# Patient Record
Sex: FEMALE | Race: BLACK OR AFRICAN AMERICAN | Employment: FULL TIME | URBAN - METROPOLITAN AREA
[De-identification: names, ages, dates, MRNs, and addresses within clinical notes are randomized per-mention and may not be internally consistent; named-entity substitution may affect disease eponyms.]

---

## 2017-05-15 PROBLEM — G44.40 ANALGESIC REBOUND HEADACHE: Status: ACTIVE | Noted: 2017-05-15

## 2017-05-15 PROBLEM — Z79.899 ENCOUNTER FOR MEDICATION MANAGEMENT: Status: ACTIVE | Noted: 2017-05-15

## 2017-05-15 PROBLEM — T39.95XA ANALGESIC REBOUND HEADACHE: Status: ACTIVE | Noted: 2017-05-15

## 2017-05-15 PROBLEM — G43.011 INTRACTABLE MIGRAINE WITHOUT AURA AND WITH STATUS MIGRAINOSUS: Status: ACTIVE | Noted: 2017-05-15

## 2017-05-15 PROBLEM — F51.04 PSYCHOPHYSIOLOGICAL INSOMNIA: Status: ACTIVE | Noted: 2017-05-15

## 2017-10-10 ENCOUNTER — HOSPITAL ENCOUNTER (OUTPATIENT)
Dept: GENERAL RADIOLOGY | Age: 47
Discharge: HOME | End: 2017-10-10
Attending: NURSE PRACTITIONER
Payer: COMMERCIAL

## 2017-10-10 DIAGNOSIS — M25.552 LEFT HIP PAIN: ICD-10-CM

## 2017-10-10 DIAGNOSIS — M54.50 LOW BACK PAIN RADIATING TO LEFT LOWER EXTREMITY: ICD-10-CM

## 2017-10-10 DIAGNOSIS — M79.605 LOW BACK PAIN RADIATING TO LEFT LOWER EXTREMITY: ICD-10-CM

## 2017-10-10 PROCEDURE — 73502 X-RAY EXAM HIP UNI 2-3 VIEWS: CPT

## 2017-10-10 PROCEDURE — 72100 X-RAY EXAM L-S SPINE 2/3 VWS: CPT

## 2017-10-10 NOTE — PROGRESS NOTES
Xray of her back is normal. So use medications I prescribed, heat on the area as well.  If no better in 1 week f/u with dr. Artis Montiel

## 2017-11-16 ENCOUNTER — HOSPITAL ENCOUNTER (EMERGENCY)
Age: 47
Discharge: HOME | End: 2017-11-16
Attending: EMERGENCY MEDICINE
Payer: COMMERCIAL

## 2017-11-16 VITALS
TEMPERATURE: 98.2 F | BODY MASS INDEX: 29.25 KG/M2 | DIASTOLIC BLOOD PRESSURE: 65 MMHG | HEIGHT: 66 IN | WEIGHT: 182 LBS | SYSTOLIC BLOOD PRESSURE: 144 MMHG | OXYGEN SATURATION: 97 % | RESPIRATION RATE: 18 BRPM | HEART RATE: 66 BPM

## 2017-11-16 DIAGNOSIS — J06.9 ACUTE UPPER RESPIRATORY INFECTION: Primary | ICD-10-CM

## 2017-11-16 PROCEDURE — 99282 EMERGENCY DEPT VISIT SF MDM: CPT | Performed by: EMERGENCY MEDICINE

## 2017-11-16 RX ORDER — AZITHROMYCIN 250 MG/1
TABLET, FILM COATED ORAL
Qty: 6 TAB | Refills: 0 | Status: SHIPPED | OUTPATIENT
Start: 2017-11-16 | End: 2018-01-05 | Stop reason: ALTCHOICE

## 2017-11-16 NOTE — ED NOTES
I have reviewed discharge instructions with the patient. The patient verbalized understanding. Patient left ED via Discharge Method: ambulatory to Home with ( self). Opportunity for questions and clarification provided. Patient given 1 scripts. To continue your aftercare when you leave the hospital, you may receive an automated call from our care team to check in on how you are doing. This is a free service and part of our promise to provide the best care and service to meet your aftercare needs.  If you have questions, or wish to unsubscribe from this service please call [phone]. Thank you for Choosing our [facility] Emergency Department.

## 2017-11-16 NOTE — ED PROVIDER NOTES
HPI Comments: Pt with cough, congestion rhinorrhea for past 5 days. Patient is a 52 y.o. female presenting with cough. The history is provided by the patient. No  was used. Cough   This is a new problem. The current episode started more than 2 days ago. The problem occurs constantly. The problem has not changed since onset. The cough is non-productive. There has been no fever. Associated symptoms include rhinorrhea and shortness of breath. Pertinent negatives include no chest pain, no chills, no eye redness, no ear pain, no headaches, no sore throat, no myalgias, no wheezing, no nausea and no vomiting. She has tried cough syrup for the symptoms. The treatment provided no relief. Past Medical History:   Diagnosis Date    Depression 8/16/2011    Migraines        Past Surgical History:   Procedure Laterality Date    HX PELVIC LAPAROSCOPY  1993    endometriosis         Family History:   Problem Relation Age of Onset    Heart Disease Mother     Hypertension Mother     Diabetes Mother     Breast Cancer Mother 48    Hypertension Father     No Known Problems Sister     No Known Problems Sister     No Known Problems Maternal Grandmother     Cancer Maternal Grandfather     Hypertension Paternal Grandmother     Cancer Paternal Grandfather        Social History     Social History    Marital status: SINGLE     Spouse name: N/A    Number of children: N/A    Years of education: N/A     Occupational History    Not on file. Social History Main Topics    Smoking status: Never Smoker    Smokeless tobacco: Never Used    Alcohol use No    Drug use: No    Sexual activity: Not Currently     Other Topics Concern    Not on file     Social History Narrative         ALLERGIES: Review of patient's allergies indicates no known allergies. Review of Systems   Constitutional: Negative for chills and fever. HENT: Positive for rhinorrhea. Negative for ear pain and sore throat.     Eyes: Negative for pain and redness. Respiratory: Positive for cough and shortness of breath. Negative for chest tightness and wheezing. Cardiovascular: Negative for chest pain and leg swelling. Gastrointestinal: Negative for abdominal pain, diarrhea, nausea and vomiting. Musculoskeletal: Negative for back pain, gait problem, myalgias, neck pain and neck stiffness. Skin: Negative for color change and rash. Neurological: Negative for weakness, numbness and headaches. Vitals:    11/16/17 0230   BP: 144/65   Pulse: 66   Resp: 18   Temp: 98.2 °F (36.8 °C)   SpO2: 97%   Weight: 82.6 kg (182 lb)   Height: 5' 6\" (1.676 m)            Physical Exam   Constitutional: She is oriented to person, place, and time. She appears well-developed and well-nourished. HENT:   Head: Normocephalic and atraumatic. Neck: Normal range of motion. Neck supple. Cardiovascular: Normal rate and regular rhythm. No murmur heard. Pulmonary/Chest: Effort normal and breath sounds normal. She has no wheezes. Abdominal: Soft. Bowel sounds are normal. There is no tenderness. Musculoskeletal: Normal range of motion. She exhibits no edema. Lymphadenopathy:     She has no cervical adenopathy. Neurological: She is alert and oriented to person, place, and time. Skin: Skin is warm and dry. Nursing note and vitals reviewed. MDM  Number of Diagnoses or Management Options  Acute upper respiratory infection:   Diagnosis management comments: URI will discharge.      Patient Progress  Patient progress: stable    ED Course       Procedures

## 2017-11-16 NOTE — DISCHARGE INSTRUCTIONS
Upper Respiratory Infection (Cold): Care Instructions  Your Care Instructions    An upper respiratory infection, or URI, is an infection of the nose, sinuses, or throat. URIs are spread by coughs, sneezes, and direct contact. The common cold is the most frequent kind of URI. The flu and sinus infections are other kinds of URIs. Almost all URIs are caused by viruses. Antibiotics won't cure them. But you can treat most infections with home care. This may include drinking lots of fluids and taking over-the-counter pain medicine. You will probably feel better in 4 to 10 days. The doctor has checked you carefully, but problems can develop later. If you notice any problems or new symptoms, get medical treatment right away. Follow-up care is a key part of your treatment and safety. Be sure to make and go to all appointments, and call your doctor if you are having problems. It's also a good idea to know your test results and keep a list of the medicines you take. How can you care for yourself at home? · To prevent dehydration, drink plenty of fluids, enough so that your urine is light yellow or clear like water. Choose water and other caffeine-free clear liquids until you feel better. If you have kidney, heart, or liver disease and have to limit fluids, talk with your doctor before you increase the amount of fluids you drink. · Take an over-the-counter pain medicine, such as acetaminophen (Tylenol), ibuprofen (Advil, Motrin), or naproxen (Aleve). Read and follow all instructions on the label. · Before you use cough and cold medicines, check the label. These medicines may not be safe for young children or for people with certain health problems. · Be careful when taking over-the-counter cold or flu medicines and Tylenol at the same time. Many of these medicines have acetaminophen, which is Tylenol. Read the labels to make sure that you are not taking more than the recommended dose.  Too much acetaminophen (Tylenol) can be harmful. · Get plenty of rest.  · Do not smoke or allow others to smoke around you. If you need help quitting, talk to your doctor about stop-smoking programs and medicines. These can increase your chances of quitting for good. When should you call for help? Call 911 anytime you think you may need emergency care. For example, call if:  ? · You have severe trouble breathing. ?Call your doctor now or seek immediate medical care if:  ? · You seem to be getting much sicker. ? · You have new or worse trouble breathing. ? · You have a new or higher fever. ? · You have a new rash. ? Watch closely for changes in your health, and be sure to contact your doctor if:  ? · You have a new symptom, such as a sore throat, an earache, or sinus pain. ? · You cough more deeply or more often, especially if you notice more mucus or a change in the color of your mucus. ? · You do not get better as expected. Where can you learn more? Go to http://juan carlos-kori.info/. Enter L155 in the search box to learn more about \"Upper Respiratory Infection (Cold): Care Instructions. \"  Current as of: May 12, 2017  Content Version: 11.4  © 2138-8696 [employer], [employer]orporated. Care instructions adapted under license by [employer] (which disclaims liability or warranty for this information). If you have questions about a medical condition or this instruction, always ask your healthcare professional. [place] [address] any warranty or liability for your use of this information.

## 2018-01-05 PROBLEM — F33.9 RECURRENT DEPRESSION (HCC): Status: ACTIVE | Noted: 2018-01-05

## 2019-10-28 ENCOUNTER — HOSPITAL ENCOUNTER (EMERGENCY)
Age: 49
Discharge: HOME | End: 2019-10-28
Attending: EMERGENCY MEDICINE
Payer: COMMERCIAL

## 2019-10-28 ENCOUNTER — APPOINTMENT (OUTPATIENT)
Dept: GENERAL RADIOLOGY | Age: 49
End: 2019-10-28
Attending: NURSE PRACTITIONER
Payer: COMMERCIAL

## 2019-10-28 VITALS
OXYGEN SATURATION: 97 % | TEMPERATURE: 97.6 F | RESPIRATION RATE: 16 BRPM | DIASTOLIC BLOOD PRESSURE: 85 MMHG | SYSTOLIC BLOOD PRESSURE: 120 MMHG | HEART RATE: 63 BPM
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
## 2019-10-28 DIAGNOSIS — M54.50 ACUTE BILATERAL LOW BACK PAIN WITHOUT SCIATICA: ICD-10-CM

## 2019-10-28 DIAGNOSIS — S16.1XXA STRAIN OF NECK MUSCLE, INITIAL ENCOUNTER: ICD-10-CM

## 2019-10-28 PROCEDURE — 74011250637 HC RX REV CODE- 250/637: Performed by: NURSE PRACTITIONER

## 2019-10-28 PROCEDURE — 72100 X-RAY EXAM L-S SPINE 2/3 VWS: CPT

## 2019-10-28 PROCEDURE — 72040 X-RAY EXAM NECK SPINE 2-3 VW: CPT

## 2019-10-28 PROCEDURE — 99283 EMERGENCY DEPT VISIT LOW MDM: CPT | Performed by: NURSE PRACTITIONER

## 2019-10-28 RX ORDER — KETOROLAC TROMETHAMINE 10 MG/1
10 TABLET, FILM COATED ORAL
Status: COMPLETED | OUTPATIENT
Start: 2019-10-28 | End: 2019-10-28

## 2019-10-28 RX ORDER — BUTALBITAL, ACETAMINOPHEN AND CAFFEINE 300; 40; 50 MG/1; MG/1; MG/1
1 CAPSULE ORAL
Qty: 8 CAP | Refills: 0 | Status: SHIPPED | OUTPATIENT
Start: 2019-10-28 | End: 2020-01-30 | Stop reason: SDUPTHER

## 2019-10-28 RX ADMIN — KETOROLAC TROMETHAMINE 10 MG: 10 TABLET, FILM COATED ORAL at 16:54
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
## 2019-10-28 NOTE — ED NOTES
I have reviewed discharge instructions with the patient. The patient verbalized understanding. Patient left ED via Discharge Method: ambulatory to Home with self  Opportunity for questions and clarification provided. Patient given 1 scripts. To continue your aftercare when you leave the hospital, you may receive an automated call from our care team to check in on how you are doing. This is a free service and part of our promise to provide the best care and service to meet your aftercare needs.  If you have questions, or wish to unsubscribe from this service please call [phone]. Thank you for Choosing our [facility] Emergency Department.

## 2019-10-28 NOTE — LETTER
[id number] [address] EMERGENCY DEPT 
[address] 
[place] [id number] [phone] Work/School Note Date: 10/28/2019 To Whom It May concern: 
 
Maritza Munroe was seen and treated today in the emergency room by the following provider(s): 
Attending Provider: Robyn Gómez DO 
Nurse Practitioner: JAYLA Alvarenga. Maritza Munroe needs to be excused from work 1/28/2019-10/29/2019.  
 
Sincerely, 
 
 
 
 
JAYLA Bennett
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
## 2019-10-28 NOTE — DISCHARGE INSTRUCTIONS
Medications as prescribed. Exercises provided will help with pain. Follow up with your primary care provider for a recheck if symptoms fail to improve or worsen. Return to the emergency department as needed.

## 2019-11-01 ENCOUNTER — HOSPITAL ENCOUNTER (EMERGENCY)
Age: 49
Discharge: HOME | End: 2019-11-01
Attending: EMERGENCY MEDICINE
Payer: COMMERCIAL

## 2019-11-01 VITALS
HEART RATE: 61 BPM | SYSTOLIC BLOOD PRESSURE: 126 MMHG | DIASTOLIC BLOOD PRESSURE: 66 MMHG | TEMPERATURE: 98 F | OXYGEN SATURATION: 98 % | RESPIRATION RATE: 16 BRPM

## 2019-11-01 DIAGNOSIS — M54.31 BILATERAL SCIATICA: Primary | ICD-10-CM

## 2019-11-01 DIAGNOSIS — M54.32 BILATERAL SCIATICA: Primary | ICD-10-CM

## 2019-11-01 PROCEDURE — 74011250636 HC RX REV CODE- 250/636: Performed by: NURSE PRACTITIONER

## 2019-11-01 PROCEDURE — 99282 EMERGENCY DEPT VISIT SF MDM: CPT | Performed by: NURSE PRACTITIONER

## 2019-11-01 PROCEDURE — 96372 THER/PROPH/DIAG INJ SC/IM: CPT | Performed by: NURSE PRACTITIONER

## 2019-11-01 RX ORDER — TRAMADOL HYDROCHLORIDE 50 MG/1
50 TABLET ORAL
Qty: 12 TAB | Refills: 0 | Status: SHIPPED | OUTPATIENT
Start: 2019-11-01 | End: 2019-11-04

## 2019-11-01 RX ORDER — KETOROLAC TROMETHAMINE 30 MG/ML
60 INJECTION, SOLUTION INTRAMUSCULAR; INTRAVENOUS
Status: COMPLETED | OUTPATIENT
Start: 2019-11-01 | End: 2019-11-01

## 2019-11-01 RX ORDER — METHYLPREDNISOLONE 4 MG/1
TABLET ORAL
Qty: 1 DOSE PACK | Refills: 0 | Status: SHIPPED | OUTPATIENT
Start: 2019-11-01 | End: 2020-01-30 | Stop reason: ALTCHOICE

## 2019-11-01 RX ADMIN — KETOROLAC TROMETHAMINE 60 MG: 30 INJECTION, SOLUTION INTRAMUSCULAR at 14:40
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
## 2019-11-01 NOTE — DISCHARGE INSTRUCTIONS
Medications, exercises, and ice to the area will help reduce pain. Follow up with your primary care provider for a recheck if symptoms fail to improve or worsen. Return to the emergency department as needed.
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
## 2019-11-01 NOTE — ED NOTES
I have reviewed discharge instructions with the patient. The patient verbalized understanding. Patient left ED via Discharge Method: ambulatory to Home with family ). Opportunity for questions and clarification provided. Patient given 2 scripts. To continue your aftercare when you leave the hospital, you may receive an automated call from our care team to check in on how you are doing. This is a free service and part of our promise to provide the best care and service to meet your aftercare needs.  If you have questions, or wish to unsubscribe from this service please call [phone]. Thank you for Choosing our [facility] Emergency Department.

## 2020-01-30 PROBLEM — I10 ESSENTIAL HYPERTENSION: Status: ACTIVE | Noted: 2020-01-30

## 2020-01-30 NOTE — LETTER
[address] [facility] EMERGENCY DEPT 
ONE  [address] [facility] 
[phone] Work/School Note Date: 11/1/2019 To Whom It May concern: 
 
Yanick Worthington was seen and treated today in the emergency room by the following provider(s): 
Nurse Practitioner: JAYLA Ivory. Yanick Worthington may return to work on  11/2/19. Sincerely, Rangel Ryan
[address] [facility] EMERGENCY DEPT 
ONE  [address] [facility] [facility] 
[phone] Work/School Note Date: 11/1/2019 To Whom It May concern: 
 
Jose D Barksdale was seen and treated today in the emergency room by the following provider(s): 
Nurse Practitioner: JAYLA Doe. Jose D Barksdale may return to work 11/3/2019 Sincerely, Magdalena Tomlin
no

## 2020-02-11 ENCOUNTER — HOSPITAL ENCOUNTER (EMERGENCY)
Age: 50
Discharge: HOME | End: 2020-02-12
Attending: EMERGENCY MEDICINE
Payer: OTHER MISCELLANEOUS

## 2020-02-11 DIAGNOSIS — M54.50 ACUTE LEFT-SIDED LOW BACK PAIN WITHOUT SCIATICA: Primary | ICD-10-CM

## 2020-02-11 PROCEDURE — 99283 EMERGENCY DEPT VISIT LOW MDM: CPT

## 2020-02-11 PROCEDURE — 85025 COMPLETE CBC W/AUTO DIFF WBC: CPT

## 2020-02-11 PROCEDURE — 80053 COMPREHEN METABOLIC PANEL: CPT

## 2020-02-11 PROCEDURE — 96374 THER/PROPH/DIAG INJ IV PUSH: CPT

## 2020-02-12 VITALS
HEART RATE: 56 BPM | WEIGHT: 180 LBS | RESPIRATION RATE: 16 BRPM | BODY MASS INDEX: 29.05 KG/M2 | SYSTOLIC BLOOD PRESSURE: 131 MMHG | DIASTOLIC BLOOD PRESSURE: 62 MMHG | OXYGEN SATURATION: 97 % | TEMPERATURE: 98.4 F

## 2020-02-12 LAB
ALBUMIN SERPL-MCNC: 4.1 G/DL (ref 3.5–5)
ALBUMIN/GLOB SERPL: 0.9 {RATIO} (ref 1.2–3.5)
ALP SERPL-CCNC: 80 U/L (ref 50–136)
ALT SERPL-CCNC: 29 U/L (ref 12–65)
ANION GAP SERPL CALC-SCNC: 6 MMOL/L (ref 7–16)
AST SERPL-CCNC: 37 U/L (ref 15–37)
BASOPHILS # BLD: 0.1 K/UL (ref 0–0.2)
BASOPHILS NFR BLD: 1 % (ref 0–2)
BILIRUB SERPL-MCNC: 0.5 MG/DL (ref 0.2–1.1)
BUN SERPL-MCNC: 16 MG/DL (ref 6–23)
CALCIUM SERPL-MCNC: 9.7 MG/DL (ref 8.3–10.4)
CHLORIDE SERPL-SCNC: 105 MMOL/L (ref 98–107)
CO2 SERPL-SCNC: 27 MMOL/L (ref 21–32)
CREAT SERPL-MCNC: 0.82 MG/DL (ref 0.6–1)
DIFFERENTIAL METHOD BLD: ABNORMAL
EOSINOPHIL # BLD: 0.1 K/UL (ref 0–0.8)
EOSINOPHIL NFR BLD: 2 % (ref 0.5–7.8)
ERYTHROCYTE [DISTWIDTH] IN BLOOD BY AUTOMATED COUNT: 13.6 % (ref 11.9–14.6)
GLOBULIN SER CALC-MCNC: 4.5 G/DL (ref 2.3–3.5)
GLUCOSE SERPL-MCNC: 90 MG/DL (ref 65–100)
HCT VFR BLD AUTO: 45.4 % (ref 35.8–46.3)
HGB BLD-MCNC: 15.2 G/DL (ref 11.7–15.4)
IMM GRANULOCYTES # BLD AUTO: 0 K/UL (ref 0–0.5)
IMM GRANULOCYTES NFR BLD AUTO: 0 % (ref 0–5)
LYMPHOCYTES # BLD: 1.9 K/UL (ref 0.5–4.6)
LYMPHOCYTES NFR BLD: 31 % (ref 13–44)
MCH RBC QN AUTO: 27.9 PG (ref 26.1–32.9)
MCHC RBC AUTO-ENTMCNC: 33.5 G/DL (ref 31.4–35)
MCV RBC AUTO: 83.3 FL (ref 79.6–97.8)
MONOCYTES # BLD: 0.4 K/UL (ref 0.1–1.3)
MONOCYTES NFR BLD: 6 % (ref 4–12)
NEUTS SEG # BLD: 3.6 K/UL (ref 1.7–8.2)
NEUTS SEG NFR BLD: 60 % (ref 43–78)
NRBC # BLD: 0 K/UL (ref 0–0.2)
PLATELET # BLD AUTO: 256 K/UL (ref 150–450)
PMV BLD AUTO: 10.2 FL (ref 9.4–12.3)
POTASSIUM SERPL-SCNC: 4.4 MMOL/L (ref 3.5–5.1)
PROT SERPL-MCNC: 8.6 G/DL (ref 6.3–8.2)
RBC # BLD AUTO: 5.45 M/UL (ref 4.05–5.2)
SODIUM SERPL-SCNC: 138 MMOL/L (ref 136–145)
WBC # BLD AUTO: 6.1 K/UL (ref 4.3–11.1)

## 2020-02-12 PROCEDURE — 74011250636 HC RX REV CODE- 250/636: Performed by: EMERGENCY MEDICINE

## 2020-02-12 RX ORDER — KETOROLAC TROMETHAMINE 30 MG/ML
30 INJECTION, SOLUTION INTRAMUSCULAR; INTRAVENOUS ONCE
Status: COMPLETED | OUTPATIENT
Start: 2020-02-12 | End: 2020-02-12

## 2020-02-12 RX ORDER — NAPROXEN 500 MG/1
500 TABLET ORAL 2 TIMES DAILY WITH MEALS
Qty: 20 TAB | Refills: 0 | Status: SHIPPED | OUTPATIENT
Start: 2020-02-12 | End: 2020-02-22

## 2020-02-12 RX ORDER — KETOROLAC TROMETHAMINE 30 MG/ML
INJECTION, SOLUTION INTRAMUSCULAR; INTRAVENOUS
Status: DISCONTINUED
Start: 2020-02-12 | End: 2020-02-12 | Stop reason: HOSPADM

## 2020-02-12 RX ORDER — CYCLOBENZAPRINE HCL 10 MG
10 TABLET ORAL
Qty: 12 TAB | Refills: 0 | Status: SHIPPED | OUTPATIENT
Start: 2020-02-12 | End: 2020-04-20 | Stop reason: ALTCHOICE

## 2020-02-12 RX ADMIN — KETOROLAC TROMETHAMINE 30 MG: 30 INJECTION, SOLUTION INTRAMUSCULAR at 01:26

## 2020-02-12 NOTE — ED PROVIDER NOTES
Patient is a 70-year-old female who comes to the ER today complaining of some low back pain and groin pain. It is worse with movement. It is been going on for about 3 days. She does do a lot of repetitive lifting and bending while at work but is not aware of any acute injury. No numbness or weakness. No bowel or bladder incontinence. The history is provided by the patient. Back Pain    This is a new problem. The current episode started more than 2 days ago. The problem has not changed since onset. The problem occurs constantly. Work related: Possibly. The pain is associated with no known injury. The pain is present in the lumbar spine and left side. The quality of the pain is described as cramping. The pain radiates to the right groin and left groin. Pertinent negatives include no chest pain, no fever, no abdominal pain, no bowel incontinence, no perianal numbness, no bladder incontinence, no dysuria, no paresthesias, no tingling and no weakness. She has tried NSAIDs and analgesics for the symptoms. The treatment provided no relief.         Past Medical History:   Diagnosis Date    Depression 8/16/2011    Migraines        Past Surgical History:   Procedure Laterality Date    HX PELVIC LAPAROSCOPY  1993    endometriosis         Family History:   Problem Relation Age of Onset    Heart Disease Mother     Hypertension Mother     Diabetes Mother    Tena Breast Cancer Mother 48    Hypertension Father     No Known Problems Sister     No Known Problems Sister     No Known Problems Maternal Grandmother     Cancer Maternal Grandfather     Hypertension Paternal Grandmother     Cancer Paternal Grandfather        Social History     Socioeconomic History    Marital status: SINGLE     Spouse name: Not on file    Number of children: Not on file    Years of education: Not on file    Highest education level: Not on file   Occupational History    Not on file   Social Needs    Financial resource strain: Not on file    Food insecurity:     Worry: Not on file     Inability: Not on file    Transportation needs:     Medical: Not on file     Non-medical: Not on file   Tobacco Use    Smoking status: Never Smoker    Smokeless tobacco: Never Used   Substance and Sexual Activity    Alcohol use: No     Alcohol/week: 0.0 standard drinks    Drug use: No    Sexual activity: Not Currently   Lifestyle    Physical activity:     Days per week: Not on file     Minutes per session: Not on file    Stress: Not on file   Relationships    Social connections:     Talks on phone: Not on file     Gets together: Not on file     Attends Yazdanism service: Not on file     Active member of club or organization: Not on file     Attends meetings of clubs or organizations: Not on file     Relationship status: Not on file    Intimate partner violence:     Fear of current or ex partner: Not on file     Emotionally abused: Not on file     Physically abused: Not on file     Forced sexual activity: Not on file   Other Topics Concern    Not on file   Social History Narrative    Not on file         ALLERGIES: Patient has no known allergies. Review of Systems   Constitutional: Negative for chills, fatigue and fever. HENT: Negative for congestion, rhinorrhea and sore throat. Eyes: Negative for pain, discharge and visual disturbance. Respiratory: Negative for cough and shortness of breath. Cardiovascular: Negative for chest pain and palpitations. Gastrointestinal: Negative for abdominal pain, bowel incontinence, diarrhea and nausea. Endocrine: Negative for polydipsia and polyuria. Genitourinary: Negative for bladder incontinence, dysuria, frequency and urgency. Musculoskeletal: Positive for back pain. Negative for neck pain. Skin: Negative for rash. Neurological: Negative for tingling, seizures, syncope, weakness and paresthesias. Hematological: Negative.         Vitals:    02/11/20 2319   BP: 118/74   Pulse: 61   Resp: 18 Temp: 98.3 °F (36.8 °C)   SpO2: 98%   Weight: 81.6 kg (180 lb)            Physical Exam  Vitals signs and nursing note reviewed. Constitutional:       Appearance: Normal appearance. She is well-developed. HENT:      Head: Normocephalic and atraumatic. Eyes:      Conjunctiva/sclera: Conjunctivae normal.      Pupils: Pupils are equal, round, and reactive to light. Neck:      Musculoskeletal: Normal range of motion and neck supple. Cardiovascular:      Rate and Rhythm: Normal rate and regular rhythm. Pulmonary:      Effort: Pulmonary effort is normal.      Breath sounds: Normal breath sounds. Abdominal:      Palpations: Abdomen is soft. Tenderness: There is no abdominal tenderness. There is no guarding or rebound. Musculoskeletal: Normal range of motion. General: No deformity. Lymphadenopathy:      Cervical: No cervical adenopathy. Skin:     General: Skin is warm and dry. Capillary Refill: Capillary refill takes less than 2 seconds. Findings: No rash. Neurological:      Mental Status: She is alert and oriented to person, place, and time. GCS: GCS eye subscore is 4. GCS verbal subscore is 5. GCS motor subscore is 6. Cranial Nerves: No cranial nerve deficit or facial asymmetry. Sensory: Sensation is intact. No sensory deficit. Motor: No weakness, tremor, seizure activity or pronator drift. Coordination: Coordination is intact. Deep Tendon Reflexes: Reflexes are normal and symmetric. Comments: negative straight leg raise          MDM  Number of Diagnoses or Management Options  Diagnosis management comments: Basic labs obtained at triage are normal.  Urinalysis is clean. Clinically I think this is all musculoskeletal pain and spasm I will prescribe NSAID for pain control and a muscle relaxant. Voice dictation software was used during the making of this note.   This software is not perfect and grammatical and other typographical errors may be present. This note has been proofread, but may still contain errors.   Hailee Cespedes MD; 2/12/2020 @1:18 AM   ===================================================================         Amount and/or Complexity of Data Reviewed  Clinical lab tests: ordered and reviewed  Independent visualization of images, tracings, or specimens: yes    Risk of Complications, Morbidity, and/or Mortality  Presenting problems: low  Diagnostic procedures: minimal  Management options: minimal    Patient Progress  Patient progress: stable         Procedures

## 2020-02-12 NOTE — ED NOTES
I have reviewed discharge instructions with the patient. The patient verbalized understanding. Patient left ED via Discharge Method: ambulatory to Home with self. Opportunity for questions and clarification provided. Patient given 2 scripts. Pt verbalized understanding of discharge instructions and prescribed medications. Pt ambulatory off unit showing no signs of acute distress. To continue your aftercare when you leave the hospital, you may receive an automated call from our care team to check in on how you are doing. This is a free service and part of our promise to provide the best care and service to meet your aftercare needs.  If you have questions, or wish to unsubscribe from this service please call [phone]. Thank you for Choosing our [facility] Emergency Department.

## 2020-02-12 NOTE — DISCHARGE INSTRUCTIONS
Take the medications as prescribed. Follow-up with your doctor or return to the ER for any other acute concerns.

## 2020-03-19 PROBLEM — F41.9 ANXIETY: Status: ACTIVE | Noted: 2020-03-19

## 2020-09-02 PROBLEM — E78.2 MIXED HYPERLIPIDEMIA: Status: ACTIVE | Noted: 2020-09-02

## 2020-09-15 ENCOUNTER — HOSPITAL ENCOUNTER (OUTPATIENT)
Dept: MAMMOGRAPHY | Age: 50
Discharge: HOME | End: 2020-09-15
Attending: NURSE PRACTITIONER
Payer: COMMERCIAL

## 2020-09-15 DIAGNOSIS — Z12.31 ENCOUNTER FOR SCREENING MAMMOGRAM FOR MALIGNANT NEOPLASM OF BREAST: ICD-10-CM

## 2020-09-15 PROCEDURE — 77067 SCR MAMMO BI INCL CAD: CPT

## 2020-09-18 NOTE — PROGRESS NOTES
Called and informed pt of mammogram results. Pt also stated that she started Effexor at night and she states that is helping some with the hot flashes.  Ty

## 2021-03-22 ENCOUNTER — HOSPITAL ENCOUNTER (EMERGENCY)
Age: 51
Discharge: HOME | End: 2021-03-22
Attending: EMERGENCY MEDICINE
Payer: COMMERCIAL

## 2021-03-22 VITALS
OXYGEN SATURATION: 96 % | WEIGHT: 200 LBS | RESPIRATION RATE: 16 BRPM | TEMPERATURE: 98.3 F | HEART RATE: 81 BPM | HEIGHT: 62 IN | SYSTOLIC BLOOD PRESSURE: 117 MMHG | DIASTOLIC BLOOD PRESSURE: 71 MMHG | BODY MASS INDEX: 36.8 KG/M2
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
## 2021-03-22 DIAGNOSIS — S16.1XXA STRAIN OF NECK MUSCLE, INITIAL ENCOUNTER: ICD-10-CM

## 2021-03-22 DIAGNOSIS — M62.838 MUSCLE SPASM: ICD-10-CM

## 2021-03-22 PROCEDURE — 96374 THER/PROPH/DIAG INJ IV PUSH: CPT

## 2021-03-22 PROCEDURE — 99283 EMERGENCY DEPT VISIT LOW MDM: CPT

## 2021-03-22 PROCEDURE — 74011250636 HC RX REV CODE- 250/636: Performed by: EMERGENCY MEDICINE

## 2021-03-22 RX ORDER — KETOROLAC TROMETHAMINE 30 MG/ML
15 INJECTION, SOLUTION INTRAMUSCULAR; INTRAVENOUS
Status: COMPLETED | OUTPATIENT
Start: 2021-03-22 | End: 2021-03-22

## 2021-03-22 RX ORDER — LIDOCAINE 50 MG/G
PATCH TOPICAL
Qty: 10 EACH | Refills: 0 | Status: SHIPPED | OUTPATIENT
Start: 2021-03-22 | End: 2021-08-19 | Stop reason: ALTCHOICE

## 2021-03-22 RX ORDER — CYCLOBENZAPRINE HCL 10 MG
10 TABLET ORAL
Qty: 10 TAB | Refills: 0 | Status: SHIPPED | OUTPATIENT
Start: 2021-03-22 | End: 2021-04-01

## 2021-03-22 RX ADMIN — KETOROLAC TROMETHAMINE 15 MG: 30 INJECTION, SOLUTION INTRAMUSCULAR at 17:45
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
## 2021-03-22 NOTE — DISCHARGE INSTRUCTIONS
Take your motrin 800 mg tables up to three times a day with food for the next week then as needed. Do the stretching exercises like we discussed. Drink 1 to 2 L of water daily. Take the Flexeril at nighttime before going to bed as needed.     Use the Lidoderm patch over the affected area 12 hours on and a minimum of 12 hours off before putting a new patch on your body

## 2021-03-22 NOTE — Clinical Note
[facility] 
[address] [facility] EMERGENCY DEPT 
[address] [address] [facility] [id number] [phone] Work/School Note Date: 3/22/2021 To Whom It May concern: 
 
Aparna Clark was seen and treated today in the emergency room by the following provider(s): 
Attending Provider: Gab Betancourt DO. Aparna Clark is excused from work/school on 03/22/21 and 03/23/21. She is medically clear to return to work/school on 3/24/2021. Sincerely, Carli Gotti DO
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
## 2021-03-22 NOTE — ED NOTES
I have reviewed discharge instructions with the patient. The patient verbalized understanding. Patient left ED via Discharge Method: wheelchair to lobby stating she had texted her  already who will be coming to pick her up for transport home. Opportunity for questions and clarification provided. Patient given 2 scripts. To continue your aftercare when you leave the hospital, you may receive an automated call from our care team to check in on how you are doing. This is a free service and part of our promise to provide the best care and service to meet your aftercare needs.  If you have questions, or wish to unsubscribe from this service please call [phone]. Thank you for Choosing our [facility] Emergency Department.

## 2021-04-07 PROBLEM — Z80.3 FAMILY HISTORY OF BREAST CANCER IN MOTHER: Status: ACTIVE | Noted: 2021-04-07

## 2021-04-07 PROBLEM — M62.838 MUSCLE SPASMS OF NECK: Status: ACTIVE | Noted: 2021-04-07

## 2021-04-07 PROBLEM — T39.95XA ANALGESIC REBOUND HEADACHE: Status: RESOLVED | Noted: 2017-05-15 | Resolved: 2021-04-07

## 2021-04-07 PROBLEM — M54.2 NECK PAIN: Status: ACTIVE | Noted: 2021-04-07

## 2021-04-07 PROBLEM — G44.40 ANALGESIC REBOUND HEADACHE: Status: RESOLVED | Noted: 2017-05-15 | Resolved: 2021-04-07

## 2021-04-07 PROBLEM — Z79.899 ENCOUNTER FOR MEDICATION MANAGEMENT: Status: RESOLVED | Noted: 2017-05-15 | Resolved: 2021-04-07

## 2021-04-07 PROBLEM — N95.1 HOT FLASHES DUE TO MENOPAUSE: Status: ACTIVE | Noted: 2021-04-07

## 2021-05-28 ENCOUNTER — HOSPITAL ENCOUNTER (OUTPATIENT)
Dept: PHYSICAL THERAPY | Age: 51
End: 2021-05-28

## 2021-05-28 ENCOUNTER — HOSPITAL ENCOUNTER (OUTPATIENT)
Dept: PHYSICAL THERAPY | Age: 51
Discharge: HOME | End: 2021-05-28
Payer: COMMERCIAL

## 2021-05-28 PROCEDURE — 97161 PT EVAL LOW COMPLEX 20 MIN: CPT

## 2021-05-28 PROCEDURE — 97140 MANUAL THERAPY 1/> REGIONS: CPT

## 2021-05-28 PROCEDURE — 97110 THERAPEUTIC EXERCISES: CPT

## 2021-05-28 NOTE — PROGRESS NOTES
Hoda Ontiveros  : 1970  Primary: [facility]  Secondary:  [address] at [facility]  [address], [facility], [address]  [phone]   [phone]         OUTPATIENT PHYSICAL THERAPY:Daily Note 2021      TREATMENT:   PT Patient Time In/Time Out  Time In: 6925  Time Out: 1315      Total Time: 32min  Visit Count:  1     ICD-10: Treatment Diagnosis: M53.3, Z47.89, M67.951  Medication Last Reviewed: 21      TREATMENT PLAN  Effective Dates: 2021 TO 2021 (90 days). Frequency/Duration: 2 times a week for 90 Day(s)         Subjective: See Evaluation Note dated 2021 for details   Pain:     Objective: See Evaluation Note dated 2021 for details      Therapeutic Exercise: (10min) Done in order to improve strength, ROM and understanding of current condition.     Date:   Date:   Date:   Date:     Activity/Exercise Parameters      Education Discussed examination findings, HEP, plan of care, aquatics, POC      Hip ABD Iso [id number] green TB (hooklying)                                      Manual Therapy: (12min) Done in order to improve joint and soft tissue mobility,reduce muscle guarding, and decrease muscle tone   Date:   Date:   Date:   Date:     Type Parameters      Joint Mobilization STM Mobs grades III-IV  L3-5 grades II-IV      Soft Tissue Mobilization Lateral hip          Modalities: (-) Done in order to reduce swelling and pain    Assessment: See Evaluation Note dated 2021 for details    Plan: See Evaluation Note dated 2021 for details    Future Appointments   Date Time Provider Felisha Jewell   2021  8:45 AM Lisa Sargent, PT [facility]   2021  7:00 PM [place] Doyle [facility]   6/3/2021 10:15 AM Lisa Sargent, PT [facility]   2021  8:45 AM [place] Doyle [facility]   6/15/2021  8:45 AM Gomez Doyle [facility]   2021  8:45 AM Enrique Valero Ana Allison [facility]   6/22/2021  8:45 AM Ilona Ours [facility]   6/24/2021  8:45 AM Ilona Ours [facility]   6/29/2021  8:45 AM Ilona Ours [facility]   7/1/2021  8:45 AM Ilona Ours [facility]   7/6/2021  8:45 AM Ilona Ours [facility]   7/8/2021  8:45 AM Ilona Ours [facility]   7/13/2021  8:45 AM Ilona Ours [facility]   8/17/2021  9:00 AM [address] [place] [address] [address] [facility]   8/17/2021  9:40 AM Eulalio Murphy MD  [address]       Unbilled Time: 10min eval  Units: 1 eval low/ 1MT/ 1TE      Cephus Slot, PT, DPT, OCS    Visit Approval Visit # Therapist initials Date A NS / Cx < 24 hr >24 hr Cx Comments    1 WJ 5/28 [x]  [] [] Initial evaluation       [] [] []        [] [] []        [] [] []        [] [] []        [] [] []        [] [] []        [] [] []        [] [] []        [] [] []        [] [] []        [] [] []        [] [] []        [] [] []        [] [] []        [] [] []        [] [] []        [] [] []

## 2021-05-28 NOTE — THERAPY EVALUATION
Caroline Baig  : 1970  Primary: [facility]  Secondary:  [address] at [facility] Therapy  [address], [address]  [phone]   [phone]          OUTPATIENT PHYSICAL THERAPY:Initial Assessment 2021   ICD-10: Treatment Diagnosis: M53.3, Z47.89, M67.951  Precautions/Allergies:   Latex, natural rubber   TREATMENT PLAN:  Effective Dates: 2021 TO 2021 (90 days). Frequency/Duration: 2 times a week for 90 Day(s) MEDICAL/REFERRING DIAGNOSIS:  Sacrococcygeal disorders, not elsewhere classified [M53.3]  Encounter for other orthopedic aftercare [Z47.89]  Unspecified disorder of synovium and tendon, right thigh [M67.951]   DATE OF ONSET: 10/23/2020 surgery  REFERRING PHYSICIAN: Alejandra Rodriguez MD MD Orders: Bridgett Ruiz and treat   Return MD Appointment:      INITIAL ASSESSMENT:  Ms. Madalynn Boxer presents to physical therapy with MD diagnosis of a SI joint pain, s/p right gluteal repair. Pt demonstrated signs and symptoms consistent with this diagnosis. On objective examination, the patient demonstrated deficits in ROM, strength, joint mobility, soft tissue mobility, functional ability. The patient also had increased pain. The patient is limited in the following activities: walking, standing, ADLs, functional tasks, work. The patient has a good  prognosis for recovery based on the examination findings and may be limited by: N/A. Patient requires skilled physical therapy services in order to return to prior level of function. PROBLEM LIST (Impacting functional limitations):  1. Decreased Strength  2. Decreased ADL/Functional Activities  3. Decreased Ambulation Ability/Technique  4. Increased Pain  5. Decreased Activity Tolerance  6. Decreased Flexibility/Joint Mobility  7. Decreased Knowledge of Precautions  8.  Decreased [place] with Home Exercise Program INTERVENTIONS PLANNED: (Treatment may consist of any combination of the following)  1. Cold  2. Heat  3. Home Exercise Program (HEP)  4. Manual Therapy  5. Neuromuscular Re-education/Strengthening  6. Range of Motion (ROM)  7. Therapeutic Activites  8. Therapeutic Exercise/Strengthening     GOALS: (Goals have been discussed and agreed upon with patient.)  Short-Term Functional Goals: Time Frame: 5 weeks  1. Pt will be compliant with progressive HEP  2. Pt will be able to do 6 sit to stands in 30sec with hands  3. Pt will improve LEFS score by 6pts  Discharge Goals: Time Frame: 10 weeks  1. Pt will improve LEFS score by 12pts  2. Pt will have Hip ABD strength of 4/5 with pain < 3/10  3. Pt will decrease TUG time to < 12sec    OUTCOME MEASURE:   Tool Used: Lower Extremity Functional Scale (LEFS)  Score:  Initial: 11/80 Most Recent: X/80 (Date: -- )   Interpretation of Score: 20 questions each scored on a 5 point scale with 0 representing \"extreme difficulty or unable to perform\" and 4 representing \"no difficulty\". The lower the score, the greater the functional disability. 80/80 represents no disability. Minimal detectable change is 9 points. MEDICAL NECESSITY:   · Patient is expected to demonstrate progress in strength, range of motion, coordination and functional technique to increase independence with ADLs and functional tasks. REASON FOR SERVICES/OTHER COMMENTS:  · Patient requires skilled physical therapy in order to return to prior level of function. Total Duration:       Rehabilitation Potential For Stated Goals: Good  Regarding Manjit Quevedo's therapy, I certify that the treatment plan above will be carried out by a therapist or under their direction.   Thank you for this referral,  Maureen Felder PT, DPT, OCS  Referring Physician Signature: Juan Luis George MD _______________________________ Date _____________     PAIN/SUBJECTIVE:   Initial:   7/10 Post Session:  5/10   HISTORY:   History of Injury/Illness (Reason for Referral):  Pt is s/p R hip labral repair, glute repair on 10/23/2020. Since then the patient has had a lot of difficulty with pain, weakness. Pt was injured at work while pushing a rack. Pt has been going to therapy since Oct/Nov but is still having a lot of pain and weakness. Pain is primarily in the lateral hip, is constant, sore, throbbing, aching. Heat/ice is the only thing that helps  Past Medical History/Comorbidities:   Ms. Aditi Ventura  has a past medical history of Chlamydia, Depression (8/16/2011), Endometriosis, Gonorrhea, Herpes, Migraines, PID (acute pelvic inflammatory disease), and Puberty. Ms. Aditi Ventura  has a past surgical history that includes hx pelvic laparoscopy (1993) and hx other surgical.  Social History/Living Environment:     Pt lives with family, 2 steps in house  Prior Level of Function/Work/Activity:  Works for [employer], light exercise  Personal Factors:          Sex:  female        Age:  48 y.o. Ambulatory/Rehab Services H2 Model Falls Risk Assessment   Risk Factors:       No Risk Factors Identified Ability to Rise from Chair:       (1)  Pushes up, successful in one attempt   Falls Prevention Plan:       No modifications necessary   Total: (5 or greater = High Risk): 1   ©2010 [facility] [employer]. All Rights Reserved. [facility] Patent #[id number]. Federal Law prohibits the replication, distribution or use without written permission from [facility] [employer]   Current Medications:       Current Outpatient Medications:     estradioL (ESTRACE) 0.01 % (0.1 mg/gram) vaginal cream, Apply externally and intravaginal qhs x 2weeks then on Sunday and Wednesday. , Disp: 42.5 g, Rfl: 0    progesterone (PROMETRIUM) 200 mg capsule, Take QHS the 1-12 of every month., Disp: 12 Cap, Rfl: 5    estradioL (VIVELLE) 0.05 mg/24 hr, Change patch on sundays and Wednesday. , Disp: 24 Patch, Rfl: 3    amLODIPine (NORVASC) 5 mg tablet, TAKE 1 TABLET BY MOUTH EVERY DAY, Disp: 90 Tab, Rfl: 1    SUMAtriptan (IMITREX) 100 mg tablet, TAKE 1 TABLET BY MOUTH ONCE AS NEEDED FOR MIGRAINE FOR UP TO 1 DOSE, Disp: , Rfl:     venlafaxine-SR (EFFEXOR-XR) 37.5 mg capsule, Take 1 Cap by mouth daily. , Disp: 90 Cap, Rfl: 1    ibuprofen (MOTRIN) 800 mg tablet, Take 800 mg by mouth every eight (8) hours as needed. , Disp: , Rfl:     DISABLED PLACARD (DISABLED PLACARD) DMV, 1 Each., Disp: , Rfl:     butalbital-acetaminophen-caff (Fioricet) -40 mg per capsule, Take 1 Cap by mouth every four (4) hours as needed for Migraine. , Disp: 8 Cap, Rfl: 3    lidocaine (Lidoderm) 5 %, Apply patch to the affected area for 12 hours a day and remove for 12 hours a day., Disp: 10 Each, Rfl: 0    LORazepam (ATIVAN) 1 mg tablet, Take 1 Tab by mouth nightly as needed for Anxiety. Max Daily Amount: 1 mg., Disp: 30 Tab, Rfl: 2   Date Last Reviewed:  21     Number of Personal Factors/Comorbidities that affect the Plan of Care: 1-2: MODERATE COMPLEXITY   EXAMINATION:   *= Painful     WNL= within normal limits     NT= not tested    Observation  Gait: Pt has significant difficulty getting up from a chair, has to use hands. Walks with a limp      ROM   Right Left   Flexion 90 115   ER 45 45   IR 10 30     Strength (all MMT scores are graded on a scale of 0-5)   Right Left   Hip      Flexion 4-* 5   Abduction 3+* 5   Extension 3+* 5   Glute Max 3+* 5   ER 4-* 5   IR 4* 5   Knee     Extension 4* 5   Flexion 4* 5       Joint/Soft Tissue Mobility   Description   Joint Mobility  Hip: hypomobile   Lumbar: painful L3-5, normal mobility   Soft Tissue Mobility TTP around lateral hip     Functional Mobility Screen   30sec Sit to Stand: 3x (w/ hands)   TU.2sec         Body Structures Involved:  1. Bones  2. Joints  3. Muscles  4. Ligaments Body Functions Affected:  1. Sensory/Pain  2. Neuromusculoskeletal  3. Movement Related Activities and Participation Affected:  1. General Tasks and Demands  2. Mobility  3. Self Care  4. Domestic Life  5.  Interpersonal Interactions and Relationships  6.  Community, Social and [place]   Number of elements (examined above) that affect the Plan of Care: 3: MODERATE COMPLEXITY   CLINICAL PRESENTATION:   Presentation: Stable and uncomplicated: LOW COMPLEXITY   CLINICAL DECISION MAKING:   Use of outcome tool(s) and clinical judgement create a POC that gives a: Clear prediction of patient's progress: LOW COMPLEXITY     Alfredo Santos PT, DPT, OCS

## 2021-06-01 ENCOUNTER — HOSPITAL ENCOUNTER (OUTPATIENT)
Dept: PHYSICAL THERAPY | Age: 51
Discharge: HOME | End: 2021-06-01
Payer: COMMERCIAL

## 2021-06-01 PROCEDURE — 97113 AQUATIC THERAPY/EXERCISES: CPT

## 2021-06-01 PROCEDURE — 97110 THERAPEUTIC EXERCISES: CPT

## 2021-06-01 NOTE — PROGRESS NOTES
Hoda Ontiveros  : 1970  Primary:   Secondary:  [address] at [facility] Therapy  [address], [address]  [phone]   [phone]         OUTPATIENT PHYSICAL THERAPY:Daily Note 2021      TREATMENT:   PT Patient Time In/Time Out  Time In: 6061  Time Out: 1000      Total Time: 65min  Visit Count:  2     ICD-10: Treatment Diagnosis: M53.3, Z47.89, M67.951  Medication Last Reviewed: 21      TREATMENT PLAN  Effective Dates: 2021 TO 2021 (90 days).  Frequency/Duration: 2 times a week for 90 Day(s)         Subjective: Pt states she is ok today   Pain: 5/10    Objective: Therapeutic Exercise: (10min) Done in order to improve strength, ROM and understanding of current condition. Date:   Date:   Date:   Date:     Activity/Exercise Parameters      Education Discussed examination findings, HEP, plan of care      NuStep x10min                                    Aquatic Therapy: (45min) Done in order to improve strength, ROM and understanding of current condition.     Date:   Date:   Date:   Date:     Activity/Exercise Parameters      Education Discussed examination findings, HEP, plan of care      SLR 3x10 B      Hip Extension 3x10 B      Step Ups 2x5 B      Hip ABD 3x10 B      Sidestepping       Walking x7min (total)      Marching 3x10 B                               Manual Therapy: (0min) Done in order to improve joint and soft tissue mobility,reduce muscle guarding, and decrease muscle tone   Date:   Date:   Date:   Date:     Type Parameters      Joint Mobilization       Soft Tissue Mobilization           Modalities: (-) Done in order to reduce swelling and pain    Assessment: Pt able to tolerate all exercises without significant increases in pain    Plan: continue with aquatics    Future Appointments   Date Time Provider Felisha Jewell   2021  8:45 AM [facility]   6/15/2021  8:45 AM Enrique Valero Pranav Moreno [facility]   6/15/2021 10:50 AM Anil Gordon MD [facility]   6/17/2021  8:45 AM Ray Charter [facility]   6/22/2021  8:45 AM Ray Charter [facility]   6/24/2021  8:45 AM Ray Charter [facility]   6/29/2021  8:45 AM Ray Charter [facility]   7/1/2021  8:45 AM Ray Charter [facility]   7/6/2021  8:45 AM Ray Charter [facility]   7/8/2021  8:45 AM Ray Charter [facility]   7/13/2021  8:45 AM Ary Charter [facility]   8/17/2021  9:00 AM [address] [place] [address] [address] [place] [address]   8/17/2021  9:40 AM Poonam Murphy MD [facility] Time:   Units: 3 Aquatic/ 1TE      Kim Toribio, PT, DPT, OCS    Visit Approval Visit # Therapist initials Date A NS / Cx < 24 hr >24 hr Cx Comments    1 WJ 5/28 [x]  [] [] Initial evaluation    2 WJ 6/1 [x] [] []        [] [] []        [] [] []        [] [] []        [] [] []        [] [] []        [] [] []        [] [] []        [] [] []        [] [] []        [] [] []        [] [] []        [] [] []        [] [] []        [] [] []        [] [] []        [] [] []

## 2021-06-02 ENCOUNTER — HOSPITAL ENCOUNTER (OUTPATIENT)
Dept: PHYSICAL THERAPY | Age: 51
Discharge: HOME | End: 2021-06-02
Payer: COMMERCIAL

## 2021-06-02 NOTE — PROGRESS NOTES
Finesse Smith  : 1970  Primary:   Secondary:  [address] at [facility] Therapy  [address], [address]  [phone]   [phone]      OUTPATIENT DAILY NOTE    NAME/AGE/GENDER: Finesse Smith is a 48 y.o. female. DATE: 2021    Ms. Iva John for today's appointment due to schedule conflcit.     Byron Rasheed, PTA

## 2021-06-03 ENCOUNTER — HOSPITAL ENCOUNTER (OUTPATIENT)
Dept: PHYSICAL THERAPY | Age: 51
Discharge: HOME | End: 2021-06-03
Payer: COMMERCIAL

## 2021-06-03 PROCEDURE — 97110 THERAPEUTIC EXERCISES: CPT

## 2021-06-03 PROCEDURE — 97113 AQUATIC THERAPY/EXERCISES: CPT

## 2021-06-03 NOTE — PROGRESS NOTES
Edwige Bullard  : 1970  Primary:   Secondary:  [address] at [facility] Therapy  [address], [address]  [phone]   [phone]         OUTPATIENT PHYSICAL THERAPY:Daily Note 6/3/2021      TREATMENT:   PT Patient Time In/Time Out  Time In: 1015  Time Out: 1115      Total Time: 60min  Visit Count:  3     ICD-10: Treatment Diagnosis: M53.3, Z47.89, M67.951  Medication Last Reviewed: 21      TREATMENT PLAN  Effective Dates: 2021 TO 2021 (90 days).  Frequency/Duration: 2 times a week for 90 Day(s)         Subjective: Pt states she is ok today   Pain: 5/10    Objective: Therapeutic Exercise: (12min) Done in order to improve strength, ROM and understanding of current condition. Date:   Date:  6/3 Date:   Date:     Activity/Exercise Parameters      Education Discussed examination findings, HEP, plan of care      NuStep x10min x12min                                   Aquatic Therapy: (45min) Done in order to improve strength, ROM and understanding of current condition.     Date:   Date:  6/3 Date:   Date:     Activity/Exercise Parameters      SLR 3x10 B 3x10 B     Hip Extension 3x10 B 3x10 B     Step Ups 2x5 B      Hip ABD 3x10 B 3x10 B     Sidestepping  8x15ft     Walking x7min (total) x3min (fwd/bwd)     Marching 3x10 B 3x10 B                              Manual Therapy: (0min) Done in order to improve joint and soft tissue mobility,reduce muscle guarding, and decrease muscle tone   Date:   Date:   Date:   Date:     Type Parameters      Joint Mobilization       Soft Tissue Mobilization           Modalities: (-) Done in order to reduce swelling and pain    Assessment: Pt able to tolerate all exercises without significant increases in pain    Plan: continue with aquatics    Future Appointments   Date Time Provider Felisha Jewell   2021  8:45 AM [facility]   6/15/2021  8:45 AM \[facility]"" [facility]   6/15/2021 10:50 AM Stephan Gutierrez MD [facility]   6/17/2021  8:45 AM Pradip Romero [facility]   6/22/2021  8:45 AM Pradip Romero [facility]   6/24/2021  8:45 AM Pradip Romero [facility]   6/29/2021  8:45 AM Pradip Romero [facility]   7/1/2021  8:45 AM Pradip Romero [facility]   7/6/2021  8:45 AM Pradip Romero [facility]   7/8/2021  8:45 AM Pradip Romero [facility]   7/13/2021  8:45 AM Pradip Romero [facility]   8/17/2021  9:00 AM [address] [place] [address] [address] [place] [address]   8/17/2021  9:40 AM [facility]        Unbilled Time:   Units: 3 Aquatic/ 1TE      Carmen Harman, PT, DPT, OCS    Visit Approval Visit # Therapist initials Date A NS / Cx < 24 hr >24 hr Cx Comments    1 WJ 5/28 [x]  [] [] Initial evaluation    2 WJ 6/1 [x] [] []     3 WJ 6/3 [x] [] []     4   [] [] []     5   [] [] []     6   [] [] []     7   [] [] []     8   [] [] []     9   [] [] []     10   [] [] []        [] [] []        [] [] []        [] [] []        [] [] []        [] [] []        [] [] []        [] [] []        [] [] []

## 2021-06-08 ENCOUNTER — HOSPITAL ENCOUNTER (OUTPATIENT)
Dept: PHYSICAL THERAPY | Age: 51
Discharge: HOME | End: 2021-06-08
Payer: COMMERCIAL

## 2021-06-08 PROCEDURE — 97113 AQUATIC THERAPY/EXERCISES: CPT

## 2021-06-08 NOTE — PROGRESS NOTES
Caroline Baig  : 1970  Primary:   Secondary:  [address] at [facility]  [address], [address]  [phone]   [phone]         OUTPATIENT PHYSICAL THERAPY:Daily Note 2021      TREATMENT:   PT Patient Time In/Time Out  Time In: 0845  Time Out: 0930      Total Time: 60min  Visit Count:  4     ICD-10: Treatment Diagnosis: M53.3, Z47.89, M67.951  Medication Last Reviewed: 21      TREATMENT PLAN  Effective Dates: 2021 TO 2021 (90 days).  Frequency/Duration: 2 times a week for 90 Day(s)         Subjective: Patient states she is a little sore after getting a shot.  Pain: 4/10    Objective: Therapeutic Exercise: (min) Done in order to improve strength, ROM and understanding of current condition. Date:   Date:  6/3 Date:   Date:     Activity/Exercise Parameters      Education Discussed examination findings, HEP, plan of care      NuStep x10min x12min                                   Aquatic Therapy: (45min) Done in order to improve strength, ROM and understanding of current condition. Date:   Date:  6/3 Date:   Date:     Activity/Exercise Parameters      SLR 3x10 B 3x10 B 3 x 10    Hip Extension 3x10 B 3x10 B     Step Ups 2x5 B  2 x 10    Hip ABD 3x10 B 3x10 B 3 x 10    Sidestepping  8x15ft 4 min    Walking x7min (total) x3min (fwd/bwd) 8 min     Marching 3x10 B 3x10 B 3 x 10    Hip flex/hip ext   3 x 10    Circles    3 x 10               Manual Therapy: (0min) Done in order to improve joint and soft tissue mobility,reduce muscle guarding, and decrease muscle tone   Date:   Date:   Date:   Date:     Type Parameters      Joint Mobilization       Soft Tissue Mobilization           Modalities: (-) Done in order to reduce swelling and pain    Assessment: Patient tolerated all aquatic exercises with good effort. Patient seems pleased with progress thus far.  Instructed patient to continue home exercises as directed.     Plan: continue with aquatics    Future Appointments   Date Time Provider Felisha Jewell   6/15/2021  8:45 AM Debbrah [facility]   6/15/2021 10:50 AM MD DAQUAN Abebe   6/17/2021  8:45 AM Debbrah Kyle [facility]   6/22/2021  8:45 AM Debbrah Kyle [facility]   6/24/2021  8:45 AM Debbrah Kyle [facility]   6/29/2021  8:45 AM Debbrah Kyle [facility]   7/1/2021  8:45 AM Debbrah Kyle [facility]   7/6/2021  8:45 AM Debbrah Kyle [facility]   7/8/2021  8:45 AM Debbrah Kyle [facility]   7/13/2021  8:45 AM Debbrah Kyle [facility]   8/17/2021  9:00  [facility] [address] [facility]   8/17/2021  9:40 AM Jonathon Murphy MD [facility] Time:   Units: [address], DPT, OCS    Visit Approval Visit # Therapist initials Date A NS / Cx < 24 hr >24 hr Cx Comments    1 W 5/28 [x]  [] [] Initial evaluation    2 WJ 6/1 [x] [] []     3 W 6/3 [x] [] []     4 [address] [facility] [address] 6/8 [x] [] []     5   [] [] []     6   [] [] []     7   [] [] []     8   [] [] []     9   [] [] []     10   [] [] []        [] [] []        [] [] []        [] [] []        [] [] []        [] [] []        [] [] []        [] [] []        [] [] []

## 2021-06-11 ENCOUNTER — HOSPITAL ENCOUNTER (OUTPATIENT)
Dept: PHYSICAL THERAPY | Age: 51
Discharge: HOME | End: 2021-06-11
Payer: COMMERCIAL

## 2021-06-11 PROCEDURE — 97113 AQUATIC THERAPY/EXERCISES: CPT

## 2021-06-11 NOTE — PROGRESS NOTES
Sandra Tubbs  : 1970  Primary:   Secondary:  [address] at [facility] Therapy  [address], [address]  [phone]   [phone]         OUTPATIENT PHYSICAL THERAPY:Daily Note 2021      TREATMENT:   PT Patient Time In/Time Out  Time In: 1100  Time Out: 1200      Total Time: 60min  Visit Count:  5     ICD-10: Treatment Diagnosis: M53.3, Z47.89, M67.951  Medication Last Reviewed: 21      TREATMENT PLAN  Effective Dates: 2021 TO 2021 (90 days).  Frequency/Duration: 2 times a week for 90 Day(s)         Subjective: Patient states she is doing pretty good just still sore.  Pain: 4/10    Objective: Therapeutic Exercise: (min) Done in order to improve strength, ROM and understanding of current condition. Date:   Date:  6/3 Date:   Date:     Activity/Exercise Parameters      Education Discussed examination findings, HEP, plan of care      NuStep x10min x12min                                   Aquatic Therapy: (60 min) Done in order to improve strength, ROM and understanding of current condition. Date:  6/3 Date:   Date:  21   Activity/Exercise      SLR 3x10 B 3 x 10 3 x 10   Hip Extension 3x10 B     Step Ups  2 x 10 3 x 10   Hip ABD 3x10 B 3 x 10 3 x10   Sidestepping 8x15ft 4 min 5 min   Walking x3min (fwd/bwd) 8 min  8 min   Marching 3x10 B 3 x 10 3 x 10   Hip flex/hip ext  3 x 10 3 x 10   Circles   3 x 10    piriformis stretrching   3 x  30 sec   bicycle   5 min   Hip flex /knee ext   3 x 10             Manual Therapy: (0min) Done in order to improve joint and soft tissue mobility,reduce muscle guarding, and decrease muscle tone   Date:   Date:   Date:   Date:     Type Parameters      Joint Mobilization       Soft Tissue Mobilization           Modalities: (-) Done in order to reduce swelling and pain    Assessment: Patient tolerated all aquatic exercises with good effort.  Patient indicates that her leg is getting better. Instructed patient to continue home exercises as directed.     Plan: continue with aquatics    Future Appointments   Date Time Provider Felisha Jewell   6/15/2021  8:45 AM Pradip Romero [facility]   6/15/2021 10:50 AM MD DAQUAN Villarreal [facility]   6/17/2021  8:45 AM Pradip Romero [facility]   6/22/2021  8:45 AM [place] Romero [facility]   6/24/2021  8:45 AM [place] Romero [facility]   6/29/2021  8:45 AM Pradip Romero [facility]   7/1/2021  8:45 AM Pradip Romero [facility]   7/6/2021  8:45 AM Pradip Romero [facility]   7/8/2021  8:45 AM Pradip Romero [facility]   7/13/2021  8:45 AM Pradip Romero [facility]   8/17/2021  9:00 AM [address] [place] [address] [address] [facility]   8/17/2021  9:40 AM Yocasta Murphy MD [address] Time:   Units: [facility]    Visit Approval Visit # Therapist initials Date A NS / Cx < 24 hr >24 hr Cx Comments    1 W 5/28 [x]  [] [] Initial evaluation    2 W 6/1 [x] [] []     3 W 6/3 [x] [] []     4 [address] 6/8 [x] [] []     5 [address] 6/11 [x] [] []     6   [] [] []     7   [] [] []     8   [] [] []     9   [] [] []     10   [] [] []        [] [] []        [] [] []        [] [] []        [] [] []        [] [] []        [] [] []        [] [] []        [] [] []

## 2021-06-15 ENCOUNTER — HOSPITAL ENCOUNTER (OUTPATIENT)
Dept: PHYSICAL THERAPY | Age: 51
Discharge: HOME | End: 2021-06-15
Payer: COMMERCIAL

## 2021-06-15 PROCEDURE — 97113 AQUATIC THERAPY/EXERCISES: CPT

## 2021-06-15 NOTE — H&P (VIEW-ONLY)
Wendy Ontiveros MD  
Bariatric & Advanced Laparoscopic Surgery [facility] Endoscopy [place] [address], [address] [place] Phone [phone]   Fax [phone] Date of visit: 6/15/2021 Primary/Requesting provider: Medardo Law NP Name: Kandi Paez MRN: [id number] : 1970 Age: 48 y.o. Sex: female PCP: Medardo Law NP  
 
CC:   
Chief Complaint Patient presents with  New Patient NP [place] Screening HPI: 
 
 Kandi Paez is a 48 y.o. female was referred here for a colonoscopy by PCP. Family hx of colon cancer NO Previous colonoscopy NO  
BRBPR NO  
Melena NO Loss of appetite NO Weight loss NO Change in bowel habits NO Smoking - denie PMH: 
 
Past Medical History:  
Diagnosis Date  Chlamydia  Depression 2011  Endometriosis  Gonorrhea  Herpes  Migraines  PID (acute pelvic inflammatory disease) Sherl Loge Puberty   
 menarche 15 yo  
 
 
PSH: 
 
Past Surgical History:  
Procedure Laterality Date  [address] Hip surgery:  repaired a tear, reapproximated gluteal muscle  HX PELVIC LAPAROSCOPY    
 endometriosis MEDS: 
 
Current Outpatient Medications Medication Sig  tiZANidine (ZANAFLEX) 2 mg tablet TAKE 1 TABLET BY MOUTH 2 TIMES A DAY AS NEEDED FOR MUSCLE SPAMS  estradioL (ESTRACE) 0.01 % (0.1 mg/gram) vaginal cream Apply externally and intravaginal qhs x 2weeks then on  and Wednesday.  progesterone (PROMETRIUM) 200 mg capsule Take QHS the 1-12 of every month.  estradioL (VIVELLE) 0.05 mg/24 hr Change patch on sundays and Wednesday.  amLODIPine (NORVASC) 5 mg tablet TAKE 1 TABLET BY MOUTH EVERY DAY  SUMAtriptan (IMITREX) 100 mg tablet TAKE 1 TABLET BY MOUTH ONCE AS NEEDED FOR MIGRAINE FOR UP TO 1 DOSE  venlafaxine-SR (EFFEXOR-XR) 37.5 mg capsule Take 1 Cap by mouth daily.   
 ibuprofen (MOTRIN) 800 mg tablet Take 800 mg by mouth every eight (8) hours as needed.  DISABLED PLACARD (DISABLED PLACARD) DMV 1 Each.  butalbital-acetaminophen-caff (Fioricet) -40 mg per capsule Take 1 Cap by mouth every four (4) hours as needed for Migraine.  lidocaine (Lidoderm) 5 % Apply patch to the affected area for 12 hours a day and remove for 12 hours a day.  LORazepam (ATIVAN) 1 mg tablet Take 1 Tab by mouth nightly as needed for Anxiety. Max Daily Amount: 1 mg. No current facility-administered medications for this visit. ALLERGIES:   
 
Allergies Allergen Reactions  Latex, Natural Rubber Itching Powder from latex gloves causes skin irritation/itching SH: Social History Tobacco Use  Smoking status: Never Smoker  Smokeless tobacco: Never Used Vaping Use  Vaping Use: Never used Substance Use Topics  Alcohol use: No  
  Alcohol/week: 0.0 standard drinks  Drug use: No  
 
 
FH: 
 
Family History Problem Relation Age of Onset  Heart Disease Mother  Hypertension Mother  Diabetes Mother  Breast Cancer Mother 48  
     chemo and radiation  Hypertension Father  No Known Problems Sister  No Known Problems Sister  No Known Problems Maternal Grandmother  Stomach Cancer Maternal Grandfather  Hypertension Paternal Grandmother  Cancer Paternal Grandfather  Ovarian Cancer Neg Hx  Colon Cancer Neg Hx  Prostate Cancer Neg Hx  Deep Vein Thrombosis Neg Hx  Pulmonary Embolism Neg Hx Review of systems: 
Review of Systems Constitutional: Negative for chills, fever and weight loss. HENT: Negative for ear pain, hearing loss and sore throat. Eyes: Negative for blurred vision, double vision and pain. Glasses Respiratory: Negative for cough, shortness of breath and wheezing. Cardiovascular: Negative for chest pain, palpitations and PND.   
Gastrointestinal: Negative for abdominal pain, heartburn, nausea and vomiting. Genitourinary: Negative for frequency and urgency. Musculoskeletal: Negative for back pain, joint pain and neck pain. Skin: Negative for itching and rash. Neurological: Negative for dizziness, loss of consciousness and headaches. Endo/Heme/Allergies: Negative for environmental allergies. Does not bruise/bleed easily. Psychiatric/Behavioral: Negative for depression and memory loss. The patient is nervous/anxious. Physical Exam:  
 
Visit Vitals /69 Pulse 63 Wt 208 lb 3.2 oz (94.4 kg) BMI 38.08 kg/m² General:  Well-developed, well-nourished, no distress. Psych:  Cooperative, good insight and judgement. Neuro:  Alert, oriented to person, place and time. HEENT:  Normocephalic, atraumatic. Sclera clear. Lungs:  Unlabored breathing. Symmetrical chest expansion. Chest wall:  No tenderness or deformity. Heart:  Regular rate and rhythm. No JVD. Abdomen:  Soft, non-tender, non-distended. No palpable masses. Extremities:  Extremities normal, atraumatic, no cyanosis or edema. Skin:  Skin color, texture, turgor normal. No rashes. Labs: All recent labs were reviewed. __________________________________________________________ ICD-10-CM ICD-9-CM 1. Colon cancer screening  Z12.11 V76.51 Assessment:  Jenny Kaufman is a 48 y.o. female was referred here for a colonoscopy. Recommendations:  
 
1. Schedule for colonoscopy. The risks, benefits, alternatives, and consequences were reviewed with the patient, who expressed verbal understanding and agreement to proceed. 2.  Bowel prep - discussed. The patient was educated on the importance of bowel preparation as well as a clear liquid diet the day before the procedure to minimize the chance of missed lesions and the need to repeat the procedure. Signed: Rhina Rubin MD 
Bariatric & Minimally Invasive Surgery [facility] 6/15/2021 11:09 AM

## 2021-06-15 NOTE — PROGRESS NOTES
Rhesa Dakins  : 1970  Primary:   Secondary:  [address] at [facility]  [address], [address]  [phone]   [phone]         OUTPATIENT PHYSICAL THERAPY:Daily Note 6/15/2021      TREATMENT:   PT Patient Time In/Time Out  Time In: 0845  Time Out: 0930      Total Time: 45min  Visit Count:  6     ICD-10: Treatment Diagnosis: M53.3, Z47.89, M67.951  Medication Last Reviewed: 06/15/21      TREATMENT PLAN  Effective Dates: 2021 TO 2021 (90 days).  Frequency/Duration: 2 times a week for 90 Day(s)         Subjective: Patient reports doing better not as stiff and a little less discomfort.  Pain: 3/10    Objective: Therapeutic Exercise: (min) Done in order to improve strength, ROM and understanding of current condition. Date:   Date:  6/3 Date:   Date:     Activity/Exercise Parameters      Education Discussed examination findings, HEP, plan of care      NuStep x10min x12min                                   Aquatic Therapy: (45 min) Done in order to improve strength, ROM and understanding of current condition. Date:   Date:  21 Date  6-15-21   Activity/Exercise      SLR 3 x 10 3 x 10 3 x 10   Hip Extension      Step Ups 2 x 10 3 x 10 3 x10   Hip ABD 3 x 10 3 x10 3 x 10   Sidestepping 4 min 5 min    Walking 8 min  8 min 5 min   Marching 3 x 10 3 x 10 3 x 10   Hip flex/hip ext 3 x 10 3 x 10 3 x 10   Circles  3 x 10     piriformis stretrching  3 x  30 sec    bicycle  5 min 5 min   Hip flex /knee ext  3 x 10 3 x 10             Manual Therapy: (0min) Done in order to improve joint and soft tissue mobility,reduce muscle guarding, and decrease muscle tone   Date:   Date:   Date:   Date:     Type Parameters      Joint Mobilization       Soft Tissue Mobilization           Modalities: (-) Done in order to reduce swelling and pain    Assessment: Patient tolerated all aquatic exercises with good effort.  Patient seems pleased with her progress indicating that she can tell a difference and she has been able to do more around the house and feels like she is walking better.      Plan: continue with aquatics    Future Appointments   Date Time Provider Felisha Morani   6/18/2021  8:45 AM Reyes Fraction [facility]   6/22/2021  8:45 AM Reyes Fraction [facility]   6/24/2021  8:45 AM Reyes Fraction [facility]   6/29/2021  8:45 AM Reyes Fraction [facility]   7/1/2021  8:45 AM Reyes Fraction [facility]   7/6/2021  8:45 AM Reyes Fraction [facility]   7/8/2021  8:45 AM Reyes Fraction [facility]   7/13/2021  8:45 AM Reyes Fraction [facility]   8/17/2021  9:00  [address] [facility] [address] [facility]   8/17/2021  9:40 AM Calvin Murphy MD [facility] [address] [facility] Time:   Units: [address], [facility]    Visit Approval Visit # Therapist initials Date A NS / Cx < 24 hr >24 hr Cx Comments    1 W 5/28 [x]  [] [] Initial evaluation    2  6/1 [x] [] []     3 W 6/3 [x] [] []     4 [facility] 6/8 [x] [] []     5 [facility] 6/11 [x] [] []     6 [facility] 6/15 [x] [] []     7   [] [] []     8   [] [] []     9   [] [] []     10   [] [] []        [] [] []        [] [] []        [] [] []        [] [] []        [] [] []        [] [] []        [] [] []        [] [] []

## 2021-06-16 ENCOUNTER — HOSPITAL ENCOUNTER (OUTPATIENT)
Dept: SURGERY | Age: 51
Discharge: HOME | End: 2021-06-16

## 2021-06-17 VITALS — BODY MASS INDEX: 33.43 KG/M2 | HEIGHT: 66 IN | WEIGHT: 208 LBS

## 2021-06-17 NOTE — PERIOP NOTES
Patient verified name, , and procedure. Type: 1a; abbreviated assessment per anesthesia guidelines    Labs per anesthesia: none    Instructed pt that they will be notified the day before their procedure by the GI Lab for time of arrival if their procedure is [facility] and Pre-op for HOSPITAL [place] [address] OF [facility]. Arrival times should be called by 5 pm. If no phone is received the patient should contact their respective hospital. The GI lab telephone number is [phone] and ES Pre-op is [phone]. Follow diet and prep instructions per office including NPO status. If patient has NOT received instructions from office patient is advised to call surgeon office, verbalizes understanding. Bath or shower the night before and the am of surgery with non-mositurizing soap. No lotions, oils, powders, cologne on skin. No make up, eye make up or jewelry. Wear loose fitting comfortable, clean clothing. Must have adult present in building the entire time . Medications for the day of procedure- norvasc, effexor, patient to hold- ibuprofen    The following discharge instructions reviewed with patient: medication given during procedure may cause drowsiness for several hours, therefore, do not drive or operate machinery for remainder of the day. You may not drink alcohol on the day of your procedure, please resume regular diet and activity unless otherwise directed. You may experience abdominal distention for several hours that is relieved by the passage of gas. Contact your physician if you have any of the following: fever or chills, severe abdominal pain or excessive amount of bleeding or a large amount when having a bowel movement.  Occasional specks of blood with bowel movement would not be unusual.

## 2021-06-18 ENCOUNTER — HOSPITAL ENCOUNTER (OUTPATIENT)
Dept: PHYSICAL THERAPY | Age: 51
Discharge: HOME | End: 2021-06-18
Payer: COMMERCIAL

## 2021-06-18 PROCEDURE — 97113 AQUATIC THERAPY/EXERCISES: CPT

## 2021-06-18 NOTE — PROGRESS NOTES
Teola Apley  : 1970  Primary:   Secondary:  [address] at [facility]  [address], [address]  [phone]   [phone]         OUTPATIENT PHYSICAL THERAPY:Daily Note 2021      TREATMENT:   PT Patient Time In/Time Out  Time In: 0845  Time Out: 0930      Total Time: 45min  Visit Count:  7     ICD-10: Treatment Diagnosis: M53.3, Z47.89, M67.951  Medication Last Reviewed: 21      TREATMENT PLAN  Effective Dates: 2021 TO 2021 (90 days).  Frequency/Duration: 2 times a week for 90 Day(s)         Subjective: Patient reports doing better not as stiff and a little less discomfort.  Pain: 3/10    Objective: Therapeutic Exercise: (min) Done in order to improve strength, ROM and understanding of current condition. Date:   Date:  6/3 Date:   Date:     Activity/Exercise Parameters      Education Discussed examination findings, HEP, plan of care      NuStep x10min x12min                                   Aquatic Therapy: (45 min) Done in order to improve strength, ROM and understanding of current condition. Date:  21 Date  6-15-21 Date  21   Activity/Exercise      SLR 3 x 10 3 x 10 3 x 10   Hip Extension      Step Ups 3 x 10 3 x10 3 x 10   Hip ABD 3 x10 3 x 10 3 x 10   Sidestepping 5 min     Walking 8 min 5 min 5 min   Marching 3 x 10 3 x 10 3 x 10   Hip flex/hip ext 3 x 10 3 x 10 3 x 10   Circles       piriformis stretrching 3 x  30 sec  3 x  30 sec   bicycle 5 min 5 min 5 min   Hip flex /knee ext 3 x 10 3 x 10 3 x 10   squats   3 x10       Manual Therapy: (0min) Done in order to improve joint and soft tissue mobility,reduce muscle guarding, and decrease muscle tone   Date:   Date:   Date:   Date:     Type Parameters      Joint Mobilization       Soft Tissue Mobilization           Modalities: (-) Done in order to reduce swelling and pain    Assessment: Patient tolerated all aquatic exercises with good effort. Patient continues to be pleased with progress indicating that her walking is getting much better.       Plan: continue with aquatics    Future Appointments   Date Time Provider Felisha Morani   6/18/2021  [facility]   6/22/2021  8:45 AM Garnell Bloch [facility]   6/24/2021  8:45 AM Garnell Bloch [facility]   6/29/2021  8:45 AM Jordyn Youh [facility]   7/1/2021  8:45 AM Garmelissa Youh [facility]   7/6/2021  8:45 AM Garmelissa Youh [facility]   7/8/2021  8:45 AM Jordyn Youh [facility]   7/13/2021  8:45 AM Garnell Bloch [facility]   8/17/2021  9:00 AM [address] [place] [address] [address] [place] [address]   8/17/2021  9:40 AM Aba Murphy MD [address] Time:   Units: [address], [facility]    Visit Approval Visit # Therapist initials Date A NS / Cx < 24 hr >24 hr Cx Comments    1 W 5/28 [x]  [] [] Initial evaluation    2  6/1 [x] [] []     3 W 6/3 [x] [] []     4 [facility] 6/8 [x] [] []     5 [facility] 6/11 [x] [] []     6 [facility] 6/15 [x] [] []     7 [facility] 6-18 [x] [] []     8   [] [] []     9   [] [] []     10   [] [] []        [] [] []        [] [] []        [] [] []        [] [] []        [] [] []        [] [] []        [] [] []        [] [] []

## 2021-06-20 ENCOUNTER — ANESTHESIA EVENT (OUTPATIENT)
Dept: ENDOSCOPY | Age: 51
End: 2021-06-20
Payer: COMMERCIAL

## 2021-06-20 RX ORDER — SODIUM CHLORIDE 9 MG/ML
10 INJECTION, SOLUTION INTRAVENOUS CONTINUOUS
Status: CANCELLED | OUTPATIENT
Start: 2021-06-20

## 2021-06-21 ENCOUNTER — ANESTHESIA (OUTPATIENT)
Dept: ENDOSCOPY | Age: 51
End: 2021-06-21
Payer: COMMERCIAL

## 2021-06-21 ENCOUNTER — HOSPITAL ENCOUNTER (OUTPATIENT)
Age: 51
Setting detail: OUTPATIENT SURGERY
Discharge: HOME | End: 2021-06-21
Attending: SURGERY | Admitting: SURGERY
Payer: COMMERCIAL

## 2021-06-21 VITALS
SYSTOLIC BLOOD PRESSURE: 115 MMHG | RESPIRATION RATE: 16 BRPM | TEMPERATURE: 98.5 F | WEIGHT: 207 LBS | BODY MASS INDEX: 33.41 KG/M2 | HEART RATE: 70 BPM | OXYGEN SATURATION: 98 % | DIASTOLIC BLOOD PRESSURE: 58 MMHG

## 2021-06-21 DIAGNOSIS — Z12.11 COLON CANCER SCREENING: ICD-10-CM

## 2021-06-21 LAB — HCG UR QL: NEGATIVE

## 2021-06-21 PROCEDURE — 74011250636 HC RX REV CODE- 250/636: Performed by: ANESTHESIOLOGY

## 2021-06-21 PROCEDURE — 76060000031 HC ANESTHESIA FIRST 0.5 HR: Performed by: SURGERY

## 2021-06-21 PROCEDURE — 74011250636 HC RX REV CODE- 250/636: Performed by: NURSE ANESTHETIST, CERTIFIED REGISTERED

## 2021-06-21 PROCEDURE — 76040000025: Performed by: SURGERY

## 2021-06-21 PROCEDURE — 45378 DIAGNOSTIC COLONOSCOPY: CPT | Performed by: SURGERY

## 2021-06-21 PROCEDURE — 81025 URINE PREGNANCY TEST: CPT

## 2021-06-21 PROCEDURE — 2709999900 HC NON-CHARGEABLE SUPPLY: Performed by: SURGERY

## 2021-06-21 RX ORDER — SODIUM CHLORIDE 0.9 % (FLUSH) 0.9 %
5-40 SYRINGE (ML) INJECTION EVERY 8 HOURS
Status: DISCONTINUED | OUTPATIENT
Start: 2021-06-21 | End: 2021-06-21 | Stop reason: HOSPADM

## 2021-06-21 RX ORDER — SODIUM CHLORIDE, SODIUM LACTATE, POTASSIUM CHLORIDE, CALCIUM CHLORIDE 600; 310; 30; 20 MG/100ML; MG/100ML; MG/100ML; MG/100ML
100 INJECTION, SOLUTION INTRAVENOUS CONTINUOUS
Status: DISCONTINUED | OUTPATIENT
Start: 2021-06-21 | End: 2021-06-21 | Stop reason: HOSPADM

## 2021-06-21 RX ORDER — PROPOFOL 10 MG/ML
INJECTION, EMULSION INTRAVENOUS
Status: DISCONTINUED | OUTPATIENT
Start: 2021-06-21 | End: 2021-06-21 | Stop reason: HOSPADM

## 2021-06-21 RX ORDER — PROPOFOL 10 MG/ML
INJECTION, EMULSION INTRAVENOUS AS NEEDED
Status: DISCONTINUED | OUTPATIENT
Start: 2021-06-21 | End: 2021-06-21 | Stop reason: HOSPADM

## 2021-06-21 RX ORDER — SODIUM CHLORIDE 0.9 % (FLUSH) 0.9 %
5-40 SYRINGE (ML) INJECTION AS NEEDED
Status: DISCONTINUED | OUTPATIENT
Start: 2021-06-21 | End: 2021-06-21 | Stop reason: HOSPADM

## 2021-06-21 RX ADMIN — SODIUM CHLORIDE, SODIUM LACTATE, POTASSIUM CHLORIDE, AND CALCIUM CHLORIDE 100 ML/HR: 600; 310; 30; 20 INJECTION, SOLUTION INTRAVENOUS at 11:21

## 2021-06-21 RX ADMIN — PROPOFOL 50 MG: 10 INJECTION, EMULSION INTRAVENOUS at 12:55

## 2021-06-21 RX ADMIN — PROPOFOL 100 MCG/KG/MIN: 10 INJECTION, EMULSION INTRAVENOUS at 12:55

## 2021-06-21 NOTE — ANESTHESIA POSTPROCEDURE EVALUATION
Procedure(s):  COLONOSCOPY/ 38.    total IV anesthesia    Anesthesia Post Evaluation      Multimodal analgesia: multimodal analgesia not used between 6 hours prior to anesthesia start to PACU discharge  Patient location during evaluation: PACU  Patient participation: complete - patient participated  Level of consciousness: awake  Pain management: adequate  Airway patency: patent  Anesthetic complications: no  Cardiovascular status: acceptable  Respiratory status: acceptable  Hydration status: acceptable  Post anesthesia nausea and vomiting:  none  Final Post Anesthesia Temperature Assessment:  Normothermia (36.0-37.5 degrees C)      INITIAL Post-op Vital signs:   Vitals Value Taken Time   /58 06/21/21 1335   Temp 36.9 °C (98.5 °F) 06/21/21 1315   Pulse 70 06/21/21 1335   Resp 16 06/21/21 1335   SpO2 98 % 06/21/21 1335

## 2021-06-21 NOTE — ANESTHESIA PREPROCEDURE EVALUATION
Relevant Problems   NEUROLOGY   (+) Recurrent depression (HCC)      CARDIOVASCULAR   (+) Essential hypertension       Anesthetic History   No history of anesthetic complications            Review of Systems / Medical History  Patient summary reviewed and pertinent labs reviewed    Pulmonary  Within defined limits                 Neuro/Psych         Headaches and psychiatric history     Cardiovascular    Hypertension          Hyperlipidemia    Exercise tolerance: >4 METS  Comments: 2011 echo preserved LV fx   GI/Hepatic/Renal  Within defined limits              Endo/Other  Within defined limits           Other Findings            Physical Exam    Airway  Mallampati: II  TM Distance: > 6 cm  Neck ROM: normal range of motion   Mouth opening: Normal     Cardiovascular    Rhythm: regular           Dental  No notable dental hx       Pulmonary                 Abdominal  GI exam deferred       Other Findings            Anesthetic Plan    ASA: 3  Anesthesia type: total IV anesthesia          Induction: Intravenous  Anesthetic plan and risks discussed with: Patient

## 2021-06-21 NOTE — DISCHARGE INSTRUCTIONS
Instructions following colonoscopy:    ACTIVITY:   Resume usual, basic activities around the house today.  You may be light-headed or sleepy from anesthesia, so be careful going up and down stairs.  Avoid driving, operating machinery, making important business decisions, or signing documents for 24 hours.  No alcohol for 24 hours.  If you have not urinated 8 hours after discharge, please call MD.    DIET:   No restriction. Greasy and spicy foods may cause upset after sedation. Please note, some people may have nausea or cramps after this procedure which can result in an upset stomach after eating.  Many people have loose stools or diarrhea immediately after colonoscopy. It is also not uncommon to not have a bowel movement for 2-3 days.  Air may have been put into your belly for the procedure. This may cause abdominal distention and gas pain. It is important to lay on your left side or ambulate to expel gas to relieve discomfort. PAIN:   Some cramping or gas pain is normal after colonoscopy. However, if you experience worsening pain over the course of the day, or pain with associated fever please call the office immediately      [address] IF:   You have a temperature higher than 101.5° Fahrenheit for more than 6 hours.  You have severe nausea or vomiting not relieved by medication; or diarrhea. Continue home medications as previously prescribed. Next colonoscopy needed in 10 years    MEDICATION INTERACTION:  During your procedure you potentially received a medication or medications which may reduce the effectiveness of oral contraceptives. Please consider other forms of contraception for 1 month following your procedure if you are currently using oral contraceptives as your primary form of birth control.  In addition to this, we recommend continuing your oral contraceptive as prescribed, unless otherwise instructed by your physician, during this time    After general anesthesia or intravenous sedation, for 24 hours or while taking prescription Narcotics:  · Limit your activities  · A responsible adult needs to be with you for the next 24 hours  · Do not drive and operate hazardous machinery  · Do not make important personal or business decisions  · Do not drink alcoholic beverages  · If you have not urinated within 8 hours after discharge, and you are experiencing discomfort from urinary retention, please go to the nearest ED. · If you have sleep apnea and have a CPAP machine, please use it for all naps and sleeping. · Please use caution when taking narcotics and any of your home medications that may cause drowsiness. *  Please give a list of your current medications to your Primary Care Provider. *  Please update this list whenever your medications are discontinued, doses are      changed, or new medications (including over-the-counter products) are added. *  Please carry medication information at all times in case of emergency situations. These are general instructions for a healthy lifestyle:  No smoking/ No tobacco products/ Avoid exposure to second hand smoke  Surgeon General's Warning:  Quitting smoking now greatly reduces serious risk to your health. Obesity, smoking, and sedentary lifestyle greatly increases your risk for illness  A healthy diet, regular physical exercise & weight monitoring are important for maintaining a healthy lifestyle    You may be retaining fluid if you have a history of heart failure or if you experience any of the following symptoms:  Weight gain of 3 pounds or more overnight or 5 pounds in a week, increased swelling in our hands or feet or shortness of breath while lying flat in bed. Please call your doctor as soon as you notice any of these symptoms; do not wait until your next office visit.

## 2021-06-21 NOTE — INTERVAL H&P NOTE
Update History & Physical 
 
The Patient's History and Physical of Michelle 15, Colonoscopy was reviewed with the patient and I examined the patient. There was no change. The surgical site was confirmed by the patient and me. Plan:  The risk, benefits, expected outcome, and alternative to the recommended procedure have been discussed with the patient. Patient understands and wants to proceed with the procedure.  
 
Electronically signed by Kyle Granda MD on 6/21/2021 at 12:44 PM

## 2021-06-21 NOTE — OP NOTES
Colonoscopy Procedure Note    Date of procedure: 2021      Name: Jorge Beck      MRN: [id number]       : 1970       Age: 48 y.o. Sex: female    Preoperative Diagnosis: 1. CRC screening          Postoperative Diagnosis: 1. same      2. Internal and external hemorrhoids    Procedure(s):  COLONOSCOPY/ 38     Procedure in Detail:  Informed consent was obtained for the procedure. The patient was placed in the left lateral decubitus position and sedation was induced by anesthesia. The [id number] was inserted into the rectum and advanced under direct vision to the cecum, which was identified by the ileocecal valve and appendiceal orifice. The quality of the colonic preparation was good. A careful inspection was made as the colonoscope was withdrawn, including a retroflexed view of the rectum; findings and interventions are described below. Appropriate photodocumentation was obtained. Findings:   Rectum:     - Protruding lesions:     -External Hemorrhoids and     -Internal Hemorrhoids  Sigmoid:   Normal  Descending Colon:   Normal  Transverse Colon:   Normal  Ascending Colon:   Normal  Cecum:   Normal      Specimens: No specimens were collected. * No specimens in log *     Complications: None; patient tolerated the procedure well. EBL - minimal    Recommendations:   - For colon cancer screening in this average-risk patient, colonoscopy may be repeated in 10 years.      Signed By: Maggie Ritchie MD  [facility] - Bariatric & Minimally Invasive Surgery  2021 1:12 PM

## 2021-06-22 ENCOUNTER — HOSPITAL ENCOUNTER (OUTPATIENT)
Dept: PHYSICAL THERAPY | Age: 51
Discharge: HOME | End: 2021-06-22
Payer: COMMERCIAL

## 2021-06-22 PROCEDURE — 97113 AQUATIC THERAPY/EXERCISES: CPT

## 2021-06-22 PROCEDURE — 97110 THERAPEUTIC EXERCISES: CPT

## 2021-06-22 NOTE — PROGRESS NOTES
Imelda Mejia  : 1970  Primary:   Secondary:  Blas Stewart at [facility] Therapy  [address], [facility], [address]  [phone]   [phone]         OUTPATIENT PHYSICAL THERAPY:Daily Note 2021      TREATMENT:   PT Patient Time In/Time Out  Time In: 0845  Time Out: 6727      Total Time: 60min  Visit Count:  8     ICD-10: Treatment Diagnosis: M53.3, Z47.89, M67.951  Medication Last Reviewed: 21      TREATMENT PLAN  Effective Dates: 2021 TO 2021 (90 days).  Frequency/Duration: 2 times a week for 90 Day(s)         Subjective: Patient reports having some pain in her leg in the front and back today.  Pain: 3/10    Objective: Therapeutic Exercise: 10:min) Done in order to improve strength, ROM and understanding of current condition. Date:   Date:  6/3 Date:   Date:     Activity/Exercise Parameters      Education Discussed examination findings, HEP, plan of care      NuStep x10min x12min X 12 min                                  Aquatic Therapy: (45 min) Done in order to improve strength, ROM and understanding of current condition.     Date  6-15-21 Date  6-18-21 Date  6-22   Activity/Exercise      SLR 3 x 10 3 x 10 3 x 10   Hip Extension   1 x 10   Step Ups 3 x10 3 x 10 3 x 10   Hip ABD 3 x 10 3 x 10 3 x 10   Sidestepping      Walking 5 min 5 min 5 min   Marching 3 x 10 3 x 10 3 x 10   Hip flex/hip ext 3 x 10 3 x 10    Circles       piriformis stretrching  3 x  30 sec 5 x 30 sec   bicycle 5 min 5 min 5 min   Hip flex /knee ext 3 x 10 3 x 10 3 x 10   squats  3 x10 3 x 10       Manual Therapy: (0min) Done in order to improve joint and soft tissue mobility,reduce muscle guarding, and decrease muscle tone   Date:   Date:   Date:   Date:     Type Parameters      Joint Mobilization       Soft Tissue Mobilization           Modalities: (-) Done in order to reduce swelling and pain    Assessment: Patient tolerated all aquatic exercises with good effort. Patient indicates that she did a lot of walking which she thinks it might have caused the increase of discomfort today. Less discomfort following treatment.            Plan: continue with aquatics    Future Appointments   Date Time Provider Felisha Jewell   6/24/2021  8:45 AM Gustabo Levels [facility]   6/29/2021  8:45 AM Gustabo Levels [facility]   7/1/2021  8:45 AM Gustabo Levels [facility]   7/6/2021  8:45 AM Gustabo Levels [facility]   7/8/2021  8:45 AM Gustabo Levels [facility]   7/13/2021  8:45 AM Gustabo Levels [facility]   8/17/2021  9:00  [facility] [address] 601 [facility]   8/17/2021  9:40 AM Anali Murphy MD  [place]        [facility] Time:   Units: 3 [address], [facility]    Visit Approval Visit # Therapist initials Date A NS / Cx < 24 hr >24 hr Cx Comments    1 W 5/28 [x]  [] [] Initial evaluation    2 W 6/1 [x] [] []     3 WJ 6/3 [x] [] []     4 [address] 6/8 [x] [] []     5 [address] 6/11 [x] [] []     6 [address] 6/15 [x] [] []     7 [address] 6-18 [x] [] []     8 [address] 6-22 [x] [] []     9   [] [] []     10   [] [] []        [] [] []        [] [] []        [] [] []        [] [] []        [] [] []        [] [] []        [] [] []        [] [] []

## 2021-06-24 ENCOUNTER — HOSPITAL ENCOUNTER (OUTPATIENT)
Dept: PHYSICAL THERAPY | Age: 51
Discharge: HOME | End: 2021-06-24
Payer: COMMERCIAL

## 2021-06-24 PROCEDURE — 97110 THERAPEUTIC EXERCISES: CPT

## 2021-06-24 PROCEDURE — 97113 AQUATIC THERAPY/EXERCISES: CPT

## 2021-06-24 NOTE — PROGRESS NOTES
Caroline Baig  : 1970  Primary:   Secondary:  [address] at [place] [address] Therapy  [address], [address]  [phone]   [phone]         OUTPATIENT PHYSICAL THERAPY:Daily Note 2021      TREATMENT:   PT Patient Time In/Time Out  Time In: 0845  Time Out: 3179      Total Time: 60min  Visit Count:  9     ICD-10: Treatment Diagnosis: M53.3, Z47.89, M67.951  Medication Last Reviewed: 21      TREATMENT PLAN  Effective Dates: 2021 TO 2021 (90 days).  Frequency/Duration: 2 times a week for 90 Day(s)         Subjective: Patient reports leg is still hurting in the front.  Pain: 10    Objective: Therapeutic Exercise: 10:min) Done in order to improve strength, ROM and understanding of current condition. Date:  6/3 Date:   Date:     Activity/Exercise      Education      NuStep x12min X 12 min X 10 min                             Aquatic Therapy: (45 min) Done in order to improve strength, ROM and understanding of current condition. Date  21 Date  6-22 Date  6-24   Activity/Exercise      SLR 3 x 10 3 x 10 3 x 10   Hip Extension  1 x 10    Step Ups 3 x 10 3 x 10 3 x 10   Hip ABD 3 x 10 3 x 10 3 x 10   Sidestepping      Walking 5 min 5 min    Marching 3 x 10 3 x 10    Hip flex/hip ext 3 x 10     Circles       piriformis stretrching 3 x  30 sec 5 x 30 sec 5 x 30 sec   bicycle 5 min 5 min    Hip flex /knee ext 3 x 10 3 x 10 3 x 10   squats 3 x10 3 x 10 3 x 10   Quad stretching   3 x 5 sec       Manual Therapy: (0min) Done in order to improve joint and soft tissue mobility,reduce muscle guarding, and decrease muscle tone   Date:   Date:   Date:   Date:     Type Parameters      Joint Mobilization       Soft Tissue Mobilization           Modalities: (-) Done in order to reduce swelling and pain    Assessment: Patient tolerated all aquatic exercises with good effort.  Patient continued to complain of quad pain even after treatment. Instructed patient to use ice and/or heat to see if it helps with quad discomfort.         Plan: continue with aquatics    Future Appointments   Date Time Provider Felisha Jewell   6/29/2021  8:45 AM Shiva Starch [facility]   7/1/2021  8:45 AM Shiva Starch [facility]   7/6/2021  8:45 AM Shiva Starch [facility]   7/8/2021  8:45 AM Shiva Starch [facility]   7/13/2021  8:45 AM Shiva Starch [facility]   8/17/2021  9:00 AM [address] [place] [address] Road 601 [facility]   8/17/2021  9:40 AM Lorena Murphy MD [facility] 850 [facility] Time:   Units: 3 Aquatic [address], [facility]    Visit Approval Visit # Therapist initials Date A NS / Cx < 24 hr >24 hr Cx Comments    1 W 5/28 [x]  [] [] Initial evaluation    2  6/1 [x] [] []     3  6/3 [x] [] []     4 [address] 6/8 [x] [] []     5 [address] 6/11 [x] [] []     6 [address] 6/15 [x] [] []     7 [address] 6-18 [x] [] []     8 [address] 6-22 [x] [] []     9 [address] 6-24 [x] [] []     10   [] [] []        [] [] []        [] [] []        [] [] []        [] [] []        [] [] []        [] [] []        [] [] []        [] [] []

## 2021-06-29 ENCOUNTER — HOSPITAL ENCOUNTER (OUTPATIENT)
Dept: PHYSICAL THERAPY | Age: 51
Discharge: HOME | End: 2021-06-29
Payer: COMMERCIAL

## 2021-06-29 PROCEDURE — 97110 THERAPEUTIC EXERCISES: CPT

## 2021-06-29 PROCEDURE — 97113 AQUATIC THERAPY/EXERCISES: CPT

## 2021-06-29 NOTE — PROGRESS NOTES
Maggie Oquendo  : 1970  Primary:   Secondary:  [address] at [facility] Therapy  [address] [facility], [facility], [address]  [phone]   [phone]         OUTPATIENT PHYSICAL THERAPY:Daily Note 2021      TREATMENT:    PT Patient Time In/Time Out  Time In: 0845  Time Out: 4105      Total Time: 60min  Visit Count:  10     ICD-10: Treatment Diagnosis: M53.3, Z47.89, M67.951  Medication Last Reviewed: 21      TREATMENT PLAN  Effective Dates: 2021 TO 2021 (90 days).  Frequency/Duration: 2 times a week for 90 Day(s)         Subjective: Patient reports she did a lot of walking over the weekend and was very sore, but better today.  Pain: 310    Objective: Therapeutic Exercise: 10:min) Done in order to improve strength, ROM and understanding of current condition. Date:   Date:  6-24 Date  6-29   Activity/Exercise      Education      NuStep X 12 min X 10 min X 12 min level 3                             Aquatic Therapy: (45 min) Done in order to improve strength, ROM and understanding of current condition. Date  6-22 Date  6-24 Date  6-29   Activity/Exercise      SLR 3 x 10 3 x 10 3 x 10   Hip Extension 1 x 10  3 x 10   Step Ups 3 x 10 3 x 10 3 x 10   Hip ABD 3 x 10 3 x 10 3 x 10   Sidestepping   X 3 min   Walking 5 min  X 5 min   Marching 3 x 10     Hip flex/hip ext      Circles       piriformis stretrching 5 x 30 sec 5 x 30 sec    bicycle 5 min  5 min   Hip flex /knee ext 3 x 10 3 x 10    squats 3 x 10 3 x 10 3 x 10   Quad stretching  3 x 5 sec 3 x 10 sec       Manual Therapy: (0min) Done in order to improve joint and soft tissue mobility,reduce muscle guarding, and decrease muscle tone   Date:   Date:   Date:   Date:     Type Parameters      Joint Mobilization       Soft Tissue Mobilization           Modalities: (-) Done in order to reduce swelling and pain    Assessment: Patient tolerated all aquatic exercises with good effort.  Better ambulation after therapy. Instructed patient to use ice and/or heat to see if it helps with quad discomfort.         Plan: continue with aquatics    Future Appointments   Date Time Provider Felisha Jewell   7/1/2021  8:45 AM [facility]   7/6/2021  8:45 AM Elnita Likes [facility]   7/8/2021  8:45 AM Elnita Likes [facility]   7/13/2021  8:45 AM Elnita Likes [facility]   8/17/2021  9:00 AM [address] [place] [address] Road 601 [facility]   8/17/2021  9:40 AM León Murphy MD [facility] 850 [facility] Time:   Units: 3 Aquatic [address], [facility] [place]    Visit Approval Visit # Therapist initials Date A NS / Cx < 24 hr >24 hr Cx Comments    1 WJ 5/28 [x]  [] [] Initial evaluation    2 W 6/1 [x] [] []     3 W 6/3 [x] [] []     4 [address] 6/8 [x] [] []     5 [address] 6/11 [x] [] []     6 [address] 6/15 [x] [] []     7 [address] 6-18 [x] [] []     8 [address] 6-22 [x] [] []     9 [address] 6-24 [x] [] []     [address] 6-29 [x] [] []        [] [] []        [] [] []        [] [] []        [] [] []        [] [] []        [] [] []        [] [] []        [] [] []

## 2021-07-01 ENCOUNTER — HOSPITAL ENCOUNTER (OUTPATIENT)
Dept: PHYSICAL THERAPY | Age: 51
Discharge: HOME | End: 2021-07-01
Payer: COMMERCIAL

## 2021-07-01 PROCEDURE — 97113 AQUATIC THERAPY/EXERCISES: CPT

## 2021-07-01 PROCEDURE — 97110 THERAPEUTIC EXERCISES: CPT

## 2021-07-01 NOTE — THERAPY EVALUATION
Manjeet Has  : 1970  Primary: Maria T Plunkett  Secondary:  [address] at [facility] Therapy  [address], [address]  [phone]   [phone]          OUTPATIENT PHYSICAL THERAPY:Re-evaluation 2021   ICD-10: Treatment Diagnosis: M53.3, Z47.89, M67.951  Precautions/Allergies:   Latex, natural rubber   TREATMENT PLAN:  Effective Dates: 2021 TO 2021 (90 days). Frequency/Duration: 2 times a week for 90 Day(s) MEDICAL/REFERRING DIAGNOSIS:  Sacrococcygeal disorders, not elsewhere classified [M53.3]  Encounter for other orthopedic aftercare [Z47.89]  Unspecified disorder of synovium and tendon, right thigh [M67.951]   DATE OF ONSET: 10/23/2020 surgery  REFERRING PHYSICIAN: Author Rene MD MD Orders: Mac Clio and treat   Return MD Appointment:      INITIAL ASSESSMENT:  Ms. Alta Black presents to physical therapy with MD diagnosis of a SI joint pain, s/p right gluteal repair. Pt demonstrated signs and symptoms consistent with this diagnosis. On objective examination, the patient demonstrated deficits in ROM, strength, joint mobility, soft tissue mobility, functional ability. The patient also had increased pain. The patient is limited in the following activities: walking, standing, ADLs, functional tasks, work. The patient has a good  prognosis for recovery based on the examination findings and may be limited by: N/A. Patient requires skilled physical therapy services in order to return to prior level of function. REASSESSMENT: Ms. Alta Black presented to physical therapy with MD diagnosis of a SI joint pain. Pt demonstrated signs and symptoms consistent with this diagnosis. On objective examination, the patient demonstrated improvements in ROM, strength, joint mobility, soft tissue mobility, functional ability, ambulatory ability.  The patient also has decreased pain These improvements have increased the patient's ability to: walk, stand, ADLs, functional tasks. The patient is still limited in the following activities: lifting, walking > 20min, standing, work, functional activities. The patient has a good prognosis for recovery based on the examination findings and may be limited by: N/A. Patient continues to require skilled physical therapy services in order to return to prior level of function. PROBLEM LIST (Impacting functional limitations):  1. Decreased Strength  2. Decreased Ambulation Ability/Technique  3. Increased Pain  4. Decreased Activity Tolerance  5. Decreased Flexibility/Joint Mobility  6. Decreased [place] with Home Exercise Program INTERVENTIONS PLANNED: (Treatment may consist of any combination of the following)  1. Cold  2. Heat  3. Home Exercise Program (HEP)  4. Manual Therapy  5. Neuromuscular Re-education/Strengthening  6. Range of Motion (ROM)  7. Therapeutic Activites  8. Therapeutic Exercise/Strengthening     GOALS: (Goals have been discussed and agreed upon with patient.)  Short-Term Functional Goals: Time Frame: 5 weeks  1. Pt will be compliant with progressive HEP-- goal met  2. Pt will be able to do 6 sit to stands in 30sec with hands-- goal met  3. Pt will improve LEFS score by 6pts-- goal met  Discharge Goals: Time Frame: 10 weeks  1. Pt will improve LEFS score by 12pts  2. Pt will have Hip ABD strength of 4/5 with pain < 3/10  3. Pt will decrease TUG time to < 12sec    OUTCOME MEASURE:   Tool Used: Lower Extremity Functional Scale (LEFS)  Score:  Initial: 11/80 Most Recent: 17/80 (Date: 7/1/2021 )   Interpretation of Score: 20 questions each scored on a 5 point scale with 0 representing \"extreme difficulty or unable to perform\" and 4 representing \"no difficulty\". The lower the score, the greater the functional disability. 80/80 represents no disability. Minimal detectable change is 9 points.       MEDICAL NECESSITY:   · Patient is expected to demonstrate progress in strength, range of motion, coordination and functional technique to increase independence with ADLs and functional tasks. · Skilled intervention continues to be required due to return to prior level of function. REASON FOR SERVICES/OTHER COMMENTS:  · Patient continues to require skilled physical therapy in order to return to prior level of function. Total Duration:       Rehabilitation Potential For Stated Goals: Good  Regarding Kevin Quevedo's therapy, I certify that the treatment plan above will be carried out by a therapist or under their direction. Thank you for this referral,  Melissa Thakkar PT, DPT, OCS  Referring Physician Signature: Carlotta Edwards MD _______________________________ Date _____________     PAIN/SUBJECTIVE:   Initial:   7/10 Post Session:  5/10   HISTORY:   History of Injury/Illness (Reason for Referral):  Pt is s/p R hip labral repair, glute repair on 10/23/2020. Since then the patient has had a lot of difficulty with pain, weakness. Pt was injured at work while pushing a rack. Pt has been going to therapy since Oct/Nov but is still having a lot of pain and weakness. Pain is primarily in the lateral hip, is constant, sore, throbbing, aching. Heat/ice is the only thing that helps  Past Medical History/Comorbidities:   Ms. Andrea Correia  has a past medical history of Chlamydia, COVID-19 vaccine series completed (04/20/2021), Depression (08/16/2011), Endometriosis, Gonorrhea, H/O echocardiogram, Herpes, Hypertension, Migraines, PID (acute pelvic inflammatory disease), and Puberty. Ms. Andrea Correia  has a past surgical history that includes hx pelvic laparoscopy (1993); hx other surgical (2020); hx heart catheterization; and colonoscopy (N/A, 6/21/2021). Social History/Living Environment:     Pt lives with family, 2 steps in house  Prior Level of Function/Work/Activity:  Works for [employer], light exercise  Personal Factors:          Sex:  female        Age:  48 y.o.    Ambulatory/Rehab Services H2 Model Falls Risk Assessment   Risk Factors:       No Risk Factors Identified Ability to Rise from Chair:       (1)  Pushes up, successful in one attempt   Falls Prevention Plan:       No modifications necessary   Total: (5 or greater = High Risk): 1   ©2010 [facility] [place] [employer] Patent #[id number]. Federal Law prohibits the replication, distribution or use without written permission from [facility] ii4b   Current Medications:       Current Outpatient Medications:     tiZANidine (ZANAFLEX) 2 mg tablet, TAKE 1 TABLET BY MOUTH 2 TIMES A DAY AS NEEDED FOR MUSCLE SPAMS (Patient not taking: Reported on 6/21/2021), Disp: 90 Tablet, Rfl: 0    estradioL (ESTRACE) 0.01 % (0.1 mg/gram) vaginal cream, Apply externally and intravaginal qhs x 2weeks then on Sunday and Wednesday. , Disp: 42.5 g, Rfl: 0    progesterone (PROMETRIUM) 200 mg capsule, Take QHS the 1-12 of every month. (Patient not taking: Reported on 6/21/2021), Disp: 12 Cap, Rfl: 5    estradioL (VIVELLE) 0.05 mg/24 hr, Change patch on sundays and Wednesday. (Patient not taking: Reported on 6/21/2021), Disp: 24 Patch, Rfl: 3    amLODIPine (NORVASC) 5 mg tablet, TAKE 1 TABLET BY MOUTH EVERY DAY, Disp: 90 Tab, Rfl: 1    SUMAtriptan (IMITREX) 100 mg tablet, TAKE 1 TABLET BY MOUTH ONCE AS NEEDED FOR MIGRAINE FOR UP TO 1 DOSE (Patient not taking: Reported on 6/21/2021), Disp: , Rfl:     venlafaxine-SR (EFFEXOR-XR) 37.5 mg capsule, Take 1 Cap by mouth daily. (Patient not taking: Reported on 6/21/2021), Disp: 90 Cap, Rfl: 1    ibuprofen (MOTRIN) 800 mg tablet, Take 800 mg by mouth every eight (8) hours as needed. (Patient not taking: Reported on 6/21/2021), Disp: , Rfl:     DISABLED PLACARD (DISABLED PLACARD) DMV, 1 Each., Disp: , Rfl:     butalbital-acetaminophen-caff (Fioricet) -40 mg per capsule, Take 1 Cap by mouth every four (4) hours as needed for Migraine. , Disp: 8 Cap, Rfl: 3    lidocaine (Lidoderm) 5 %, Apply patch to the affected area for 12 hours a day and remove for 12 hours a day. (Patient not taking: Reported on 2021), Disp: 10 Each, Rfl: 0    LORazepam (ATIVAN) 1 mg tablet, Take 1 Tab by mouth nightly as needed for Anxiety. Max Daily Amount: 1 mg. (Patient not taking: Reported on 2021), Disp: 30 Tab, Rfl: 2   Date Last Reviewed:  21     Number of Personal Factors/Comorbidities that affect the Plan of Care: 1-2: MODERATE COMPLEXITY   EXAMINATION:   *= Painful     WNL= within normal limits     NT= not tested    Observation  Gait: Pt able to rise from chair with hands quickly, can get up without hands but has more difficulty. Increased walking speed with less hesitation      ROM   Right Left   Flexion 100 115   ER 45 45   IR 30 30     Strength (all MMT scores are graded on a scale of 0-5)   Right Left   Hip      Flexion 5 5   Abduction 3+ 5   Extension 5 5   Glute Max 5 5   ER 5 5   IR 4+* 5   Knee     Extension 5 5   Flexion 5 5       Joint/Soft Tissue Mobility   Description   Joint Mobility  Hip: hypomobile, no pain and improved   Lumbar: decreased pain   Soft Tissue Mobility TTP around lateral hip, improved     Functional Mobility Screen   30sec Sit to Stand: 11x (w/ hands)   TU.9sec         Body Structures Involved:  1. Bones  2. Joints  3. Muscles  4. Ligaments Body Functions Affected:  1. Sensory/Pain  2. Neuromusculoskeletal  3. Movement Related Activities and Participation Affected:  1. General Tasks and Demands  2. Mobility  3. Self Care  4. Domestic Life  5. Interpersonal Interactions and Relationships  6.  Community, Social and [place]   Number of elements (examined above) that affect the Plan of Care: 3: MODERATE COMPLEXITY   CLINICAL PRESENTATION:   Presentation: Stable and uncomplicated: LOW COMPLEXITY   CLINICAL DECISION MAKING:   Use of outcome tool(s) and clinical judgement create a POC that gives a: Clear prediction of patient's progress: LOW COMPLEXITY     Karyle Neighbours PT, DPT, OCS

## 2021-07-01 NOTE — PROGRESS NOTES
Finesse Luis  : 1970  Primary:   Secondary:  [address] at [place] [address] Therapy  [address], [address]  [phone]   [phone]         OUTPATIENT PHYSICAL THERAPY:Daily Note 2021      TREATMENT:    PT Patient Time In/Time Out  Time In: 0845  Time Out: 9099      Total Time: 60min  Visit Count:  11     ICD-10: Treatment Diagnosis: M53.3, Z47.89, M67.951  Medication Last Reviewed: 21      TREATMENT PLAN  Effective Dates: 2021 TO 2021 (90 days).  Frequency/Duration: 2 times a week for 90 Day(s)         Subjective: Patient reports leg is feeling better not as sore.  Pain: 2/10    Objective: Therapeutic Exercise: 10:min) Done in order to improve strength, ROM and understanding of current condition. Date:  6-24 Date  6-29 Date  7-1-21   Activity/Exercise      Education      NuStep X 10 min X 12 min level 3 X 12 min level 3                             Aquatic Therapy: (45 min) Done in order to improve strength, ROM and understanding of current condition. Date  6-24 Date  6-29 Date  7-1   Activity/Exercise      SLR 3 x 10 3 x 10 3 x 10   Hip Extension  3 x 10 3 x 10   Step Ups 3 x 10 3 x 10 3 x 10   Hip ABD 3 x 10 3 x 10 3 x 10   Sidestepping  X 3 min X 3 min   Walking  X 5 min X 5 min   Marching      Hip flex/hip ext      Circles       piriformis stretrching 5 x 30 sec     bicycle  5 min 5 min   Hip flex /knee ext 3 x 10     squats 3 x 10 3 x 10 3 x 10   Quad stretching 3 x 5 sec 3 x 10 sec 3 x 10 sec       Manual Therapy: (0min) Done in order to improve joint and soft tissue mobility,reduce muscle guarding, and decrease muscle tone   Date:   Date:   Date:   Date:     Type Parameters      Joint Mobilization       Soft Tissue Mobilization           Modalities: (-) Done in order to reduce swelling and pain    Assessment: Patient tolerated all aquatic exercises with good effort.  Continues to gain good improvement in both strength and ROM. Instructed patient to use ice and/or heat to see if it helps with quad discomfort.         Plan: continue with aquatics    Future Appointments   Date Time Provider Felisha Fanta   7/6/2021  8:45 AM Erin Zepeda [facility]   7/8/2021  8:45 AM Erin Zepeda [facility]   7/13/2021  8:45 AM Dante Gale PT [facility]   8/17/2021  9:00 AM [address] [place] [address] Road 601 [facility]   8/17/2021  9:40 AM Francisco J Murphy MD  [place]        Unbilled Time:   Units: 3 Aquatic [address], [facility]    Visit Approval Visit # Therapist initials Date A NS / Cx < 24 hr >24 hr Cx Comments    1 WJ 5/28 [x]  [] [] Initial evaluation    2 WJ 6/1 [x] [] []     3 WJ 6/3 [x] [] []     [address] 6/8 [x] [] []     5 [address] 6/11 [x] [] []     6 [address] 6/15 [x] [] []     7 [address] 6-18 [x] [] []     [address] 6-22 [x] [] []     [address] 6-24 [x] [] []     [address] 6-29 [x] [] []     [address] 7-1 [x] [] []        [] [] []        [] [] []        [] [] []        [] [] []        [] [] []        [] [] []        [] [] []

## 2021-07-06 ENCOUNTER — HOSPITAL ENCOUNTER (OUTPATIENT)
Dept: PHYSICAL THERAPY | Age: 51
Discharge: HOME | End: 2021-07-06
Payer: COMMERCIAL

## 2021-07-06 NOTE — PROGRESS NOTES
Sloan Carr  : 1970  Primary: Summer Kirkpatrick [facility]  Secondary:  [address] at [facility] Therapy  [address], [facility] [address]  [phone]   [phone]      OUTPATIENT DAILY NOTE    NAME/AGE/GENDER: Sloan Carr is a 48 y.o. female. DATE: 2021    Ms. Gwen Sykes for today's appointment due to sickness. Phoenix Ramon, PTA

## 2021-07-08 ENCOUNTER — HOSPITAL ENCOUNTER (OUTPATIENT)
Dept: PHYSICAL THERAPY | Age: 51
Discharge: HOME | End: 2021-07-08
Payer: COMMERCIAL

## 2021-07-08 PROCEDURE — 97140 MANUAL THERAPY 1/> REGIONS: CPT

## 2021-07-08 PROCEDURE — 97110 THERAPEUTIC EXERCISES: CPT

## 2021-07-08 NOTE — PROGRESS NOTES
Maggie Oquendo  : 1970  Primary:   Secondary:  [address] at [place] [address] Therapy  [address], [address]  [phone]   [phone]         OUTPATIENT PHYSICAL THERAPY:Daily Note 2021      TREATMENT:     PT Patient Time In/Time Out  Time In: 0845  Time Out: 0940      Total Time: 55min  Visit Count:  12     ICD-10: Treatment Diagnosis: M53.3, Z47.89, M67.951  Medication Last Reviewed: 21      TREATMENT PLAN  Effective Dates: 2021 TO 2021 (90 days).  Frequency/Duration: 2 times a week for 90 Day(s)         Subjective: Patient reports leg is still really sore along the outside.  Pain: 3/10    Objective: Therapeutic Exercise: 40:min) Done in order to improve strength, ROM and understanding of current condition. Date  6-29 Date  7-1-21 Date  21     Activity/Exercise      Education      NuStep X 12 min level 3 X 12 min level 3 X 12 min level 3    SLR   3 x 10   Hip abd     3 x 10   Knee to chest   3 x 10   Trunk rotation   3 x 10   clams   3 x 10   Sit to stand   2 x 10   Hip flexor stretching   In prone and supine 3 x 1 min     Aquatic Therapy: (min) Done in order to improve strength, ROM and understanding of current condition.     Date  6-29 Date  7-1    Activity/Exercise      SLR 3 x 10 3 x 10    Hip Extension 3 x 10 3 x 10    Step Ups 3 x 10 3 x 10    Hip ABD 3 x 10 3 x 10    Sidestepping X 3 min X 3 min    Walking X 5 min X 5 min    Marching      Hip flex/hip ext      Circles       piriformis stretrching      bicycle 5 min 5 min    Hip flex /knee ext      squats 3 x 10 3 x 10    Quad stretching 3 x 10 sec 3 x 10 sec        Manual Therapy: ( 15min) Done in order to improve joint and soft tissue mobility,reduce muscle guarding, and decrease muscle tone   Date:   Date:   Date:   Date:     Type Parameters      Joint Mobilization       Soft Tissue Mobilization To quad and IT band          Modalities: (-) Done in order to reduce swelling and pain    Assessment: Patient tolerated treatment with mild discomfort. Patient indicated less discomfort following stretching. Instructed patient to continue home exercises as directed.     Plan: continue with aquatics    Future Appointments   Date Time Provider Felisha Jewell   7/13/2021  8:45 AM Mikeal Lesch, DPT [facility]   7/19/2021  8:45 AM Danny Nelson, PT [facility]   7/22/2021  8:45 AM Francsheeba Nelson, PT [facility]   7/26/2021  8:45 AM Francsheeba Nelson, PT [facility]   7/29/2021  8:45 AM Francies [place], PT [facility]   8/17/2021  9:00  [facility] [address] [facility]   8/17/2021  9:40 AM Italia Murphy MD [facility] Time:   Units:  3 TE 1MT      Sanya Bansal PTA    Visit Approval Visit # Therapist initials Date A NS / Cx < 24 hr >24 hr Cx Comments    1 WJ 5/28 [x]  [] [] Initial evaluation    2 WJ 6/1 [x] [] []     3 WJ 6/3 [x] [] []     4 [address] 6/8 [x] [] []     5 [address] 6/11 [x] [] []     6 [address] 6/15 [x] [] []     7 [address] 6-18 [x] [] []     8 [address] 6-22 [x] [] []     9 [address] 6-24 [x] [] []     [address] 6-29 [x] [] []     [address] 7-1 [x] [] []     12 [address] 7/8 [x] [] []        [] [] []        [] [] []        [] [] []        [] [] []        [] [] []        [] [] []

## 2021-07-13 ENCOUNTER — HOSPITAL ENCOUNTER (OUTPATIENT)
Dept: PHYSICAL THERAPY | Age: 51
Discharge: HOME | End: 2021-07-13
Payer: COMMERCIAL

## 2021-07-13 PROCEDURE — 97110 THERAPEUTIC EXERCISES: CPT

## 2021-07-13 PROCEDURE — 97113 AQUATIC THERAPY/EXERCISES: CPT

## 2021-07-13 NOTE — PROGRESS NOTES
Sebastian Eddy  : 1970  Primary:   Secondary:  [address] at [facility]  [address], [address]  [phone]   [phone]         OUTPATIENT PHYSICAL THERAPY:Daily Note 2021      TREATMENT:     PT Patient Time In/Time Out  Time In: 9231  Time Out: 8283      Total Time: 60min  Visit Count:  13     ICD-10: Treatment Diagnosis: M53.3, Z47.89, M67.951  Medication Last Reviewed: 21      TREATMENT PLAN  Effective Dates: 2021 TO 2021 (90 days).  Frequency/Duration: 2 times a week for 90 Day(s)         Subjective: Patient states she is a little sore today because she did a lot of walking over the weekend   Pain: 3/10    Objective: Therapeutic Exercise: (15min) Done in order to improve strength, ROM and understanding of current condition. Date  6-29 Date  7-1-21 Date  21      Activity/Exercise       Education       NuStep X 12 min level 3 X 12 min level 3 X 12 min level 3  x15min   SLR   3 x 10    Hip abd     3 x 10    Knee to chest   3 x 10    Trunk rotation   3 x 10    clams   3 x 10    Sit to stand   2 x 10    Hip flexor stretching   In prone and supine 3 x 1 min      Aquatic Therapy: (45min) Done in order to improve strength, ROM and understanding of current condition.     Date  6-29 Date  7-1 Date:     Activity/Exercise      SLR 3 x 10 3 x 10    Hip Extension 3 x 10 3 x 10 3x15 B   Step Ups 3 x 10 3 x 10 x20   Hip ABD 3 x 10 3 x 10 3x15 B   Sidestepping X 3 min X 3 min 3x40ft   Walking X 5 min X 5 min    Marching      Hip flex/hip ext      Circles       piriformis stretrching      bicycle 5 min 5 min    Hip flex /knee ext      squats 3 x 10 3 x 10 3x10   Quad stretching 3 x 10 sec 3 x 10 sec    Lateral Step Downs   3x10 B                               Manual Therapy: ( 0min) Done in order to improve joint and soft tissue mobility,reduce muscle guarding, and decrease muscle tone   Date:   Date:   Date:   Date: Type Parameters      Joint Mobilization       Soft Tissue Mobilization To quad and IT band          Modalities: (-) Done in order to reduce swelling and pain    Assessment: Patient tolerated treatment well and was able to progress some exercises    Plan: continue with aquatics    Future Appointments   Date Time Provider Felisha Jewell   7/19/2021  8:45 AM Luwana Hermelinda, PT [facility]   7/22/2021  8:45 AM Luwana Hermelinda, PT [facility]   7/26/2021  8:45 AM Luwana Hermelinda, PT [facility]   7/29/2021  8:45 AM Luwana Hermelinda, PT [facility]   8/17/2021  9:00 AM [address] [place] [address] Road 601 [facility]   8/17/2021  9:40 AM Zuleyka Murphy MD  [facility]       [facility] Time:   Units:  1TE/ 3QO      Morgan Carlos PT,     Visit Approval Visit # Therapist initials Date A NS / Cx < 24 hr >24 hr Cx Comments    1 W 5/28 [x]  [] [] Initial evaluation    2 W 6/1 [x] [] []     3 W 6/3 [x] [] []     4 [address] 6/8 [x] [] []     5 [address] 6/11 [x] [] []     6 [address] 6/15 [x] [] []     7 [address] 6-18 [x] [] []     8 [address] 6-22 [x] [] []     9 [address] 6-24 [x] [] []     [address] 6-29 [x] [] []     [address] 7-1 [x] [] []     [address] 7/8 [x] [] []     13 W 7/13 [x] [] []        [] [] []        [] [] []        [] [] []        [] [] []        [] [] []

## 2021-07-19 ENCOUNTER — HOSPITAL ENCOUNTER (OUTPATIENT)
Dept: PHYSICAL THERAPY | Age: 51
Discharge: HOME | End: 2021-07-19
Payer: COMMERCIAL

## 2021-07-19 PROCEDURE — 97110 THERAPEUTIC EXERCISES: CPT

## 2021-07-19 NOTE — PROGRESS NOTES
Kori Duty  : 1970  Primary:   Secondary:  [address] at [facility]  [address], [facility], [address]  [phone]   [phone]         OUTPATIENT PHYSICAL THERAPY:Daily Note 2021      TREATMENT:     PT Patient Time In/Time Out  Time In: 0845  Time Out: 8548      Total Time: 60min  Visit Count:  14     ICD-10: Treatment Diagnosis: M53.3, Z47.89, M67.951  Medication Last Reviewed: 21      TREATMENT PLAN  Effective Dates: 2021 TO 2021 (90 days).  Frequency/Duration: 2 times a week for 90 Day(s)         Subjective: Patient states she is doing well, doctor is pleased with her progress   Pain: 2/10    Objective: Therapeutic Exercise: (60min) Done in order to improve strength, ROM and understanding of current condition. Date  21 Date  21 Date:   Date:     Activity/Exercise       Education       NuStep X 12 min level 3 X 12 min level 3  x15min x15min lvl 3   SLR  3 x 10     Hip abd    3 x 10  3x10 B    Knee to chest  3 x 10     Trunk rotation  3 x 10     clams  3 x 10  [id number] B red TB   Sit to stand  2 x 10     Hip flexor stretching  In prone and supine 3 x 1 min     Bridges    3x10 w/ red TB ABD   Prone Hip Extension    3x10 B   Mini Squats    3x10 red TB ABD                                                             Aquatic Therapy: (0min) Done in order to improve strength, ROM and understanding of current condition.     Date  6-29 Date  7-1 Date:     Activity/Exercise      SLR 3 x 10 3 x 10    Hip Extension 3 x 10 3 x 10 3x15 B   Step Ups 3 x 10 3 x 10 x20   Hip ABD 3 x 10 3 x 10 3x15 B   Sidestepping X 3 min X 3 min 3x40ft   Walking X 5 min X 5 min    Marching      Hip flex/hip ext      Circles       piriformis stretrching      bicycle 5 min 5 min    Hip flex /knee ext      squats 3 x 10 3 x 10 3x10   Quad stretching 3 x 10 sec 3 x 10 sec    Lateral Step Downs   3x10 B                               Manual Therapy: ( 0min) Done in order to improve joint and soft tissue mobility,reduce muscle guarding, and decrease muscle tone   Date:  7/8 Date:   Date:   Date:     Type Parameters      Joint Mobilization       Soft Tissue Mobilization To quad and IT band          Modalities: (-) Done in order to reduce swelling and pain    Assessment: Patient able to progress some exercise load without increases in symptoms    Plan: continue with aquatics    Future Appointments   Date Time Provider Felisha Jewell   7/21/2021  8:00 AM Wilmon Philippe, PT [facility]   7/22/2021  8:45 AM Wilmon Philippe, PT [facility]   7/26/2021  8:45 AM Wilmon Philippe, PT [facility]   7/28/2021  8:45 AM Wilmon Philippe, PT [facility]   7/29/2021  8:45 AM Wilmon Philippe, PT [facility]   8/2/2021  8:45 AM Wilmon Philippe, PT [facility]   8/4/2021  8:45 AM Wilmon Philippe, PT [facility]   8/6/2021  8:45 AM V[place] Grime [facility]   8/9/2021  8:45 AM Wilmon Philippe, PT [facility]   8/11/2021  8:45 AM Linh Grime [facility]   8/13/2021  8:45 AM Wilmon Philippe, PT [facility]   8/16/2021  8:45 AM Wilmon Philippe, PT [facility]   8/17/2021  9:00 AM [address] [place] [address] Road 601 [facility]   8/17/2021  9:40 AM Mitch Miller MD [facility]   8/18/2021  8:45 AM Wilmon Philippe, PT [facility]   8/20/2021  8:45 AM Wilmon Philippe, PT [facility]   8/23/2021  8:45 AM Wilmon Philippe, PT [facility]   8/25/2021  8:45 AM Wilmon Philippe, PT [facility]   8/27/2021  8:45 AM Wilmon Philippe, PT [facility]   8/30/2021  8:45 AM ANT [place]   9/1/2021  8:45 AM ANT [place]   9/3/2021  8:45 AM Elayne Philippe PT [facility] Time:   Units:  4TE      Cristi Monique PT,     Visit Approval Visit # Therapist initials Date A NS / Cx < 24 hr >24 hr Cx Comments    1  5/28 [x]  [] [] Initial evaluation    2 WJ 6/1 [x] [] []     3  6/3 [x] [] []     4 [address] 6/8 [x] [] []     5 [address] 6/11 [x] [] []     6 [address] 6/15 [x] [] []     7 [address] 6-18 [x] [] []     8 [address] 6-22 [x] [] []     [address] 6-24 [x] [] []     [address] 6-29 [x] [] []     [address] 7-1 [x] [] []     [address] 7/8 [x] [] []     13  7/13 [x] [] []     14 [facility] 7/19 [x] [] []        [] [] []        [] [] []        [] [] []        [] [] []

## 2021-07-21 ENCOUNTER — HOSPITAL ENCOUNTER (OUTPATIENT)
Dept: PHYSICAL THERAPY | Age: 51
Discharge: HOME | End: 2021-07-21
Payer: COMMERCIAL

## 2021-07-21 PROCEDURE — 97113 AQUATIC THERAPY/EXERCISES: CPT

## 2021-07-21 PROCEDURE — 97110 THERAPEUTIC EXERCISES: CPT

## 2021-07-21 NOTE — PROGRESS NOTES
Tiana tK  : 1970  Primary:   Secondary:  [address] at [facility]  6200 [facility], [facility], [address]  [phone]   [phone]         OUTPATIENT PHYSICAL THERAPY:Daily Note 2021      TREATMENT:     PT Patient Time In/Time Out  Time In: 0800  Time Out: 0900      Total Time: 60min  Visit Count:  15     ICD-10: Treatment Diagnosis: M53.3, Z47.89, M67.951  Medication Last Reviewed: 21      TREATMENT PLAN  Effective Dates: 2021 TO 2021 (90 days).  Frequency/Duration: 2 times a week for 90 Day(s)         Subjective: Patient states she is a little sore today, definitely feels like she got a workout the other day   Pain: 2/10    Objective: Therapeutic Exercise: (15min) Done in order to improve strength, ROM and understanding of current condition. Date  21 Date:   Date:   Date:     Activity/Exercise       Education       NuStep X 12 min level 3  x15min x15min lvl 3 x15min lvl 3   SLR 3 x 10      Hip abd   3 x 10  3x10 B     Knee to chest 3 x 10      Trunk rotation 3 x 10      clams 3 x 10  [id number] B red TB    Sit to stand 2 x 10      Hip flexor stretching In prone and supine 3 x 1 min      Bridges   3x10 w/ red TB ABD    Prone Hip Extension   3x10 B    Mini Squats   3x10 red TB ABD                                                              Aquatic Therapy: (45min) Done in order to improve strength, ROM and understanding of current condition.     Date  6-29 Date  7-1 Date:   Date:     Activity/Exercise       SLR 3 x 10 3 x 10     Hip Extension 3 x 10 3 x 10 3x15 B 3x15 B   Step Ups 3 x 10 3 x 10 x20 x30 (higher steps)   Hip ABD 3 x 10 3 x 10 3x15 B 3x15 B   Sidestepping X 3 min X 3 min 3x40ft 3x40ft   Walking X 5 min X 5 min  x4min   Marching       Hip flex/hip ext       Circles        piriformis stretrching       bicycle 5 min 5 min     Hip flex /knee ext       squats 3 x 10 3 x 10 3x10    Quad stretching 3 x 10 sec 3 x 10 sec     Lateral Step Downs   3x10 B 3x10 B   Hip Flexion    3x15 B                            Manual Therapy: ( 0min) Done in order to improve joint and soft tissue mobility,reduce muscle guarding, and decrease muscle tone   Date:  7/8 Date:   Date:   Date:     Type Parameters      Joint Mobilization       Soft Tissue Mobilization To quad and IT band          Modalities: (-) Done in order to reduce swelling and pain    Assessment: Patient able to increase difficulty level of some exercises and do step ups with onto higher step without pain    Plan: continue with aquatics    Future Appointments   Date Time Provider Felisha Jewell   7/22/2021  8:45 AM Will Rily, PT [facility]   7/26/2021  8:45 AM Will Rily, PT [facility]   7/28/2021  8:45 AM Maik Salcido [facility]   7/29/2021  8:45 AM Will Rily, PT [facility]   8/2/2021  8:45 AM Will Rily, PT [facility]   8/4/2021  8:45 AM Will Rily, PT [facility]   8/6/2021  8:45 AM Maik Salcido [facility]   8/9/2021  8:45 AM Will Rily, PT [facility]   8/11/2021  8:45 AM Miak Salcido [facility]   8/13/2021  8:45 AM Will Rily, PT [facility]   8/16/2021  8:45 AM Will Rily, PT [facility]   8/17/2021  9:00 AM [address] [place] [address] [address] [place] [address]   8/17/2021  9:40 AM Maxcine Mesi, Rosalva Canavan, MD [facility]   8/18/2021  8:45 AM Will Rily, PT [facility]   8/20/2021  8:45 AM Will Rily, PT [facility]   8/23/2021  8:45 AM Will Rily, PT [facility]   8/25/2021  8:45 AM Will Rily, PT [facility]   8/27/2021  8:45 AM Will Rily, PT [facility]   8/30/2021  8:45 AM Will Rily, PT [facility]   9/1/2021  8:45 AM Will [facility]   9/3/2021  8:45 AM Will [facility]       Unbilled Time:   Units:  1TE/ 3Aq      Donavon Stearns PT,     Visit Approval Visit # Therapist initials Date A NS / Cx < 24 hr >24 hr Cx Comments    1 WJ 5/28 [x]  [] [] Initial evaluation    2 WJ 6/1 [x] [] []     3 WJ 6/3 [x] [] []     4 [address] 6/8 [x] [] []     5 [address] 6/11 [x] [] []     6 [address] 6/15 [x] [] []     7 [address] 6-18 [x] [] []     [address] 6-22 [x] [] []     [address] 6-24 [x] [] []     [address] 6-29 [x] [] [] Progress Note    [address] 7-1 [x] [] []     12 [address] 7/8 [x] [] []     13 WJ 7/13 [x] [] []     14 WJ 7/19 [x] [] []     15 WJ 7/21 [x] [] []        [] [] []        [] [] []        [] [] []

## 2021-07-22 ENCOUNTER — HOSPITAL ENCOUNTER (OUTPATIENT)
Dept: PHYSICAL THERAPY | Age: 51
Discharge: HOME | End: 2021-07-22
Payer: COMMERCIAL

## 2021-07-22 PROCEDURE — 97110 THERAPEUTIC EXERCISES: CPT

## 2021-07-22 PROCEDURE — 97113 AQUATIC THERAPY/EXERCISES: CPT

## 2021-07-22 NOTE — PROGRESS NOTES
Hira Christal  : 1970  Primary:   Secondary:  [address] at [facility]  [address], [facility], [address]  [phone]   [phone]         OUTPATIENT PHYSICAL THERAPY:Daily Note 2021      TREATMENT:     PT Patient Time In/Time Out  Time In: 0845  Time Out: 7399      Total Time: 60min  Visit Count:  16     ICD-10: Treatment Diagnosis: M53.3, Z47.89, M67.951  Medication Last Reviewed: 21      TREATMENT PLAN  Effective Dates: 2021 TO 2021 (90 days).  Frequency/Duration: 2 times a week for 90 Day(s)         Subjective: Patient states she is feeling good today, encouraged   Pain: 1/10    Objective: Therapeutic Exercise: (15min) Done in order to improve strength, ROM and understanding of current condition. Date:   Date:   Date:   Date:     Activity/Exercise       Education       NuStep x15min x15min lvl 3 x15min lvl 3 x15min lvl 3   SLR       Hip abd    3x10 B      Knee to chest       Trunk rotation       clams  [id number] B red TB     Sit to stand       Hip flexor stretching       Bridges  3x10 w/ red TB ABD     Prone Hip Extension  3x10 B     Mini Squats  3x10 red TB ABD                                                               Aquatic Therapy: (45min) Done in order to improve strength, ROM and understanding of current condition.     Date   Date:   Date:   Date:     Activity/Exercise       SLR 3 x 10      Hip Extension 3 x 10 3x15 B 3x15 B 2x15 B   Step Ups 3 x 10 x20 x30 (higher steps) x30 (higher steps)   Hip ABD 3 x 10 3x15 B 3x15 B 2x15 B   Sidestepping X 3 min 3x40ft 3x40ft    Walking X 5 min  x4min    Marching              Circles        SL Balance    x3min   bicycle 5 min      Hip flex /knee ext    2x10 B   squats 3 x 10 3x10     Quad stretching 3 x 10 sec      Lateral Step Downs  3x10 B 3x10 B x30  B   Hip Flexion   3x15 B 2x15 B   Jogging    [id number]                     Manual Therapy: ( 0min) Done in order to improve joint and soft tissue mobility,reduce muscle guarding, and decrease muscle tone   Date:  7/8 Date:   Date:   Date:     Type Parameters      Joint Mobilization       Soft Tissue Mobilization To quad and IT band          Modalities: (-) Done in order to reduce swelling and pain    Assessment: Patient continues to increase difficulty level of exercise without pain    Plan: continue with aquatics    Future Appointments   Date Time Provider Felisha Jewell   7/26/2021  8:45 AM Dante Gale, PT [facility]   7/28/2021  8:45 AM Erin Zepeda [facility]   7/29/2021  8:45 AM Dante Gale, PT [facility]   8/2/2021  8:45 AM Dante Gale, PT [facility]   8/4/2021  8:45 AM Dante Gale, PT [facility]   8/6/2021  8:45 AM Erin Zepeda [facility]   8/9/2021  8:45 AM Dante Gale, PT [facility]   8/11/2021  8:45 AM Erin Zepeda [facility]   8/13/2021  8:45 AM Dante Gale, PT [facility]   8/16/2021  8:45 AM Dante Gale, PT [facility]   8/17/2021  9:00 AM [address] [place] [address] Road 601 [facility]   8/17/2021  9:40 AM Berna Cockayne, MD [facility]   8/18/2021  8:45 AM Dante Gale, PT [facility]   8/20/2021  8:45 AM Dante Gale, PT [facility]   8/23/2021  8:45 AM Dante Gale, PT [facility]   8/25/2021  8:45 AM Dante Gale, PT [facility]   8/27/2021  8:45 AM Dante Gale, PT [facility]   8/30/2021  8:45 AM Dante Gale, PT [facility]   9/1/2021  8:45 AM Dante Gale, PT [facility]   9/3/2021  8:45 AM Dante Gale PT [facility]       [place] Time:   Units:  1TE/ 3Aq      Wendy Flores PT,     Visit Approval Visit # Therapist initials Date A NS / Cx < 24 hr >24 hr Cx Comments    1  5/28 [x]  [] [] Initial evaluation    2  6/1 [x] [] []     3  6/3 [x] [] []     4 [address] [facility] 6/8 [x] [] []     5 [address] [facility] 6/11 [x] [] []     [address] 6/15 [x] [] []     7 [address] 6-18 [x] [] []     [address] 6-22 [x] [] []     [address] 6-24 [x] [] []     [address] 6-29 [x] [] [] Progress Note    [address] 7-1 [x] [] []     [address] 7/8 [x] [] []     13  7/13 [x] [] []     14 [address] 7/19 [x] [] []     15  7/21 [x] [] []     16  7/22 [x] [] []        [] [] []        [] [] []

## 2021-07-26 ENCOUNTER — HOSPITAL ENCOUNTER (OUTPATIENT)
Dept: PHYSICAL THERAPY | Age: 51
Discharge: HOME | End: 2021-07-26
Payer: COMMERCIAL

## 2021-07-26 PROCEDURE — 97113 AQUATIC THERAPY/EXERCISES: CPT

## 2021-07-26 NOTE — PROGRESS NOTES
Mike Kaur  : 1970  Primary:   Secondary:  [address] at [facility] Therapy  [address], [facility], [address]  [phone]   [phone]         OUTPATIENT PHYSICAL THERAPY:Daily Note 2021      TREATMENT:     PT Patient Time In/Time Out  Time In: 0845  Time Out: 30      Total Time: 45 min  Visit Count:  17     ICD-10: Treatment Diagnosis: M53.3, Z47.89, M67.951  Medication Last Reviewed: 21      TREATMENT PLAN  Effective Dates: 2021 TO 2021 (90 days).  Frequency/Duration: 2 times a week for 90 Day(s)         Subjective: Patient reports having more discomfort in her groin today.  Pain: 4/10    Objective: Therapeutic Exercise: (min) Done in order to improve strength, ROM and understanding of current condition. Date:   Date:   Date:   Date:     Activity/Exercise       Education       NuStep x15min x15min lvl 3 x15min lvl 3 x15min lvl 3   SLR       Hip abd    3x10 B      Knee to chest       Trunk rotation       clams  [id number] B red TB     Sit to stand       Hip flexor stretching       Bridges  3x10 w/ red TB ABD     Prone Hip Extension  3x10 B     Mini Squats  3x10 red TB ABD                                                               Aquatic Therapy: (45min) Done in order to improve strength, ROM and understanding of current condition.     Date:   Date:   Date  21   Activity/Exercise      SLR      Hip Extension 3x15 B 2x15 B 2x15 B   Step Ups x30 (higher steps) x30 (higher steps) x30 (higher steps)   Hip ABD 3x15 B 2x15 B 2x15 B   Sidestepping 3x40ft  3x40ft   Walking x4min     Marching            Circles       SL Balance  x3min    bicycle      Hip flex /knee ext  2x10 B 2x10 B   squats   1 x 10   Quad stretching      Lateral Step Downs 3x10 B x30  B    Hip Flexion 3x15 B 2x15 B 2x15 B   Jogging  [id number]    Hip flexor stretching   4 x 1 min             Manual Therapy: ( 0min) Done in order to improve joint and soft tissue mobility,reduce muscle guarding, and decrease muscle tone   Date:  7/8 Date:   Date:   Date:     Type Parameters      Joint Mobilization       Soft Tissue Mobilization To quad and IT band          Modalities: (-) Done in order to reduce swelling and pain    Assessment: Patient tolerated treatment well today. She seemed to gain good relief from stretching. Instructed patient to continue home exercises as directed.     Plan: continue with aquatics    Future Appointments   Date Time Provider Felisha Jewell   7/28/2021  8:45 AM [facility]   7/29/2021  8:45 AM Morrie Asp, PT [facility]   8/2/2021  8:45 [facility]   8/4/2021  8:45 AM Morrie Asp, PT [facility]   8/6/2021  8:45 AM Irehenrik Winfield [facility]   8/9/2021  8:45 AM Morrie Asp, PT [facility]   8/11/2021  8:45 [facility]   8/13/2021  8:45 AM Morrie Asp, PT [facility]   8/16/2021  8:45 AM Morrie Asp, PT [facility]   8/17/2021  9:00 AM [address] [place] [address] Road [address] [facility]   8/17/2021  9:40 AM Paula Garland MD [facility]   8/18/2021  8:45 AM Morrie Asp, PT [facility]   8/20/2021  8:45 AM Morrie Asp, PT [facility]   8/23/2021  8:45 AM Morrie Asp, PT [facility]   8/25/2021  8:45 AM Morrie Asp, PT [facility]   8/27/2021  8:45 AM Morrie Asp, PT [facility]   8/30/2021  8:45 AM Morrie Asp, PT [facility]   9/1/2021  8:45 AM Morrie Asp, PT [facility]   9/3/2021  8:45 AM Morrie Asp, PT [facility]       Elayne Goon Time:   Units:  / Dierdre Leaver, PTA    Visit Approval Visit # Therapist initials Date A NS / Cx < 24 hr >24 hr Cx Comments    1 WJ 5/28 [x]  [] [] Initial evaluation    2 WJ 6/1 [x] [] []     3 WJ 6/3 [x] [] []     4 [address] 6/8 [x] [] []     5 [address] 6/11 [x] [] []     6 [address] 6/15 [x] [] []     7 [address] 6-18 [x] [] []     8 [address] 6-22 [x] [] []     9 [address] 6-24 [x] [] []     [address] 6-29 [x] [] [] Progress Note    [address] 7-1 [x] [] []     [address] 7/8 [x] [] []     13  7/13 [x] [] []     [address] 7/19 [x] [] []     15  7/21 [x] [] []     16  7/22 [x] [] []     17 [address] 7/26 [x] [] []        [] [] []

## 2021-07-28 ENCOUNTER — HOSPITAL ENCOUNTER (OUTPATIENT)
Dept: PHYSICAL THERAPY | Age: 51
Discharge: HOME | End: 2021-07-28
Payer: COMMERCIAL

## 2021-07-28 PROCEDURE — 97110 THERAPEUTIC EXERCISES: CPT

## 2021-07-29 ENCOUNTER — HOSPITAL ENCOUNTER (OUTPATIENT)
Dept: PHYSICAL THERAPY | Age: 51
Discharge: HOME | End: 2021-07-29
Payer: COMMERCIAL

## 2021-07-29 PROCEDURE — 97113 AQUATIC THERAPY/EXERCISES: CPT

## 2021-07-29 PROCEDURE — 97110 THERAPEUTIC EXERCISES: CPT

## 2021-07-29 NOTE — PROGRESS NOTES
Jamee Ojedacon  : 1970  Primary:   Secondary:  [address] at [facility] Therapy  [address], [address]  [phone]   [phone]         OUTPATIENT PHYSICAL THERAPY:Daily Note 2021      TREATMENT:     PT Patient Time In/Time Out  Time In: 0845  Time Out: 1257      Total Time: 60 min  Visit Count:  19     ICD-10: Treatment Diagnosis: M53.3, Z47.89, M67.951  Medication Last Reviewed: 21      TREATMENT PLAN  Effective Dates: 2021 TO 2021 (90 days).  Frequency/Duration: 2 times a week for 90 Day(s)         Subjective: Patient reports she is doing very well today. Felt great yesterday and continues to feel good today   Pain: 10    Objective: Therapeutic Exercise: (15min) Done in order to improve strength, ROM and understanding of current condition. Date:   Date:   Date   Date:     Activity/Exercise       Education       NuStep x15min lvl 3 x15min lvl 3 X 15 min level 3 x10min lvl 5   Treadmill    x5min @ 1.5mph   SLR   3 x 10    Hip abd         Knee to chest       Trunk rotation       clams   2 x10    Sit to stand   2 x 10    Hip flexor stretching       Bridges       Prone Hip Extension   3 x 10    Mini Squats       Hip flexor stretching    1 x 10       3 x 1 min                                                Aquatic Therapy: (45min) Done in order to improve strength, ROM and understanding of current condition.     Date:   Date:   Date  21 Date:     Activity/Exercise       SLR       Hip Extension 3x15 B 2x15 B 2x15 B 2x15 B   Step Ups x30 (higher steps) x30 (higher steps) x30 (higher steps) x30 (middle steps)   Hip ABD 3x15 B 2x15 B 2x15 B 2x15 B   Sidestepping 3x40ft  3x40ft    Walking x4min      Marching       Leg Swings Flex --> Ext    3x15 B   Circles        SL Balance  x3min  x4min   bicycle       Hip flex /knee ext  2x10 B 2x10 B    squats   1 x 10    Quad stretching       Lateral Step Cristi Bolton 3x10 B x30  B  3x15 B   Hip Flexion 3x15 B 2x15 B 2x15 B    Jogging  [id number]  [id number]   Hip flexor stretching   4 x 1 min               Manual Therapy: ( 0min) Done in order to improve joint and soft tissue mobility,reduce muscle guarding, and decrease muscle tone   Date:  7/8 Date:   Date:   Date:     Type Parameters      Joint Mobilization       Soft Tissue Mobilization To quad and IT band          Modalities: (-) Done in order to reduce swelling and pain    Assessment: Patient continues to tolerate treatment well.  Increasing difficulty of aquatic exercise and was able to progress to some treadmill walking    Plan: continue with aquatics    Future Appointments   Date Time Provider Felisha Jewell   8/2/2021  8:45 AM Esther Boothe [facility]   8/4/2021  8:45 AM Esther Boothe [facility]   8/6/2021  8:45 AM Esther Boothe [facility]   8/9/2021  8:45 AM Esther Boothe [facility]   8/11/2021  8:45 AM Esther Boothe [facility]   8/13/2021  8:45 AM Luwana Hermelinda, PT [facility]   8/16/2021  8:45 AM Luwana Hermelinda, PT [facility]   8/17/2021  9:[address] [facility]   8/17/2021  9:40 AM Odessa Salazar MD [facility]   8/18/2021  8:45 AM Luwana Hermelinda, PT [facility]   8/20/2021  8:45 AM Luwana Hermelinda, PT [facility]   8/23/2021  8:45 AM Luwana Hermelinda, PT [facility]   8/25/2021  8:45 AM Luwana Hermelinda, PT [facility]   8/27/2021  8:45 AM Luwana Hermelinda, PT [facility]   8/30/2021  8:45 AM Luwana Hermelinda, PT [facility]   9/1/2021  8:45 AM Luwana Hermelinda, PT [facility]   9/3/2021  8:45 AM Luwana Hermelinda, PT [facility]       Vladimir [facility] Time:   Units:  3AE/ 1TE      Silver Merl, PT, PTA    Visit Approval Visit # Therapist initials Date A NS / Cx < 24 hr >24 hr Cx Comments    1 W 5/28 [x]  [] [] Initial evaluation    2 W 6/1 [x] [] []     3 WJ 6/3 [x] [] []     4 [facility] 6/8 [x] [] []     5 [facility] 6/11 [x] [] []     6 [facility] 6/15 [x] [] []     7 [facility] 6-18 [x] [] []     8 [facility] 6-22 [x] [] []     9 [facility] 6-24 [x] [] []     10 [facility] 6-29 [x] [] [] Progress Note    11 [facility] 7-1 [x] [] []     12 [facility] 7/8 [x] [] []     13  7/13 [x] [] []     14  7/19 [x] [] []     15 [facility] 7/21 [x] [] []     16  7/22 [x] [] []     17 [facility] 7/26 [x] [] []     18 [facility] 7/28 [x] [] []     19 W 7/29 [x]          []          []          []          []          []          []          []          []          []

## 2021-08-02 ENCOUNTER — HOSPITAL ENCOUNTER (OUTPATIENT)
Dept: PHYSICAL THERAPY | Age: 51
Discharge: HOME | End: 2021-08-02
Payer: COMMERCIAL

## 2021-08-02 PROCEDURE — 97113 AQUATIC THERAPY/EXERCISES: CPT

## 2021-08-02 NOTE — PROGRESS NOTES
Rena Zahida  : 1970  Primary:   Secondary:  [address] at [place] [address] Therapy  [address], [address]  [phone]   [phone]         OUTPATIENT PHYSICAL THERAPY:Daily Note 2021      TREATMENT:     PT Patient Time In/Time Out  Time In: 0845  Time Out: 0930      Total Time: 45 min  Visit Count:  20     ICD-10: Treatment Diagnosis: M53.3, Z47.89, M67.951  Medication Last Reviewed: 21      TREATMENT PLAN  Effective Dates: 2021 TO 2021 (90 days).  Frequency/Duration: 2 times a week for 90 Day(s)         Subjective: Patient reports other leg is hurting more today.  Pain: 4/10    Objective: Therapeutic Exercise: (min) Done in order to improve strength, ROM and understanding of current condition. Date:   Date:   Date   Date:     Activity/Exercise       Education       NuStep x15min lvl 3 x15min lvl 3 X 15 min level 3 x10min lvl 5   Treadmill    x5min @ 1.5mph   SLR   3 x 10    Hip abd         Knee to chest       Trunk rotation       clams   2 x10    Sit to stand   2 x 10    Hip flexor stretching       Bridges       Prone Hip Extension   3 x 10    Mini Squats       Hip flexor stretching    1 x 10       3 x 1 min                                                Aquatic Therapy: (45min) Done in order to improve strength, ROM and understanding of current condition.     Date  21 Date:   Date     Activity/Exercise      SLR   3 x 10   Hip Extension 2x15 B 2x15 B 2x15 B   Step Ups x30 (higher steps) x30 (middle steps) x30 (middle steps)   Hip ABD 2x15 B 2x15 B 2x15 B   Sidestepping 3x40ft     Walking      Marching      Leg Swings Flex --> Ext  3x15 B    Circles       SL Balance  x4min    bicycle      Hip flex /knee ext 2x10 B  2 x 10 B   squats 1 x 10  2 x 10   Quad stretching      Lateral Step Downs  3x15 B    Hip Flexion 2x15 B     Jogging  [id number]    Hip flexor stretching 4 x 1 min  4 x 1 min   Modified quad stretching   3 x 1 min       Manual Therapy: ( 0min) Done in order to improve joint and soft tissue mobility,reduce muscle guarding, and decrease muscle tone   Date:  7/8 Date:   Date:   Date:     Type Parameters      Joint Mobilization       Soft Tissue Mobilization To quad and IT band          Modalities: (-) Done in order to reduce swelling and pain    Assessment: Patient continues to tolerate treatment well. Less discomfort in both quads following session today.     Plan: continue with aquatics    Future Appointments   Date Time Provider Felisha Jewell   8/4/2021  8:45 AM [facility]   8/5/2021 11:00 AM [facility]   8/6/2021  8:45 AM Lucianne Inoue [facility]   8/9/2021  8:45 AM Lucianne Inoue [facility]   8/11/2021  8:45 AM Lucianne Inoue [facility]   8/13/2021  8:45 AM Isabella Chroman, PT [facility]   8/16/2021  8:45 AM Isabella Chroman, PT [facility]   8/17/2021  9:00 AM [address] [place] [address] Road 601 [facility]   8/17/2021  9:40 AM Fawad Bach MD [facility]   8/18/2021  8:45 AM Isabella Chroman, PT [facility]   8/20/2021  8:45 AM Isabella Chroman, PT [facility]   8/23/2021  8:45 AM Isabella Chroman, PT [facility]   8/25/2021  8:45 AM Isabella Chroman, PT [facility]   8/27/2021  8:45 AM Isabella Chroman, PT [facility]   8/30/2021  8:45 AM Isabella Chroman, PT [facility]   9/1/2021  8:45 AM Isabella Chroman, PT [facility]   9/3/2021  8:45 AM Isabella Chroman, PT [facility]       Corean Mends Time:   Units:  3AE/ TE      Tali Crooked, PTA    Visit Approval Visit # Therapist initials Date A NS / Cx < 24 hr >24 hr Cx Comments    1 WJ 5/28 [x]  [] [] Initial evaluation    2 W 6/1 [x] [] []     3 W 6/3 [x] [] []     4 [address] 6/8 [x] [] []     5 [address] 6/11 [x] [] []     [address] 6/15 [x] [] []     [address] 6-18 [x] [] []     8 [address] 6-22 [x] [] []     9 [address] 6-24 [x] [] [] [id number] 6-29 [x] [] [] Progress Note    [address] 7-1 [x] [] []     [address] 7/8 [x] [] []     13  7/13 [x] [] []     14 [facility] 7/19 [x] [] []     15 [facility] 7/21 [x] [] []     16  7/22 [x] [] []     17 [address] 7/26 [x] [] []     18 [address] 7/28 [x] [] []     19  7/29 [x]       20 [address] 8/2 [x]          []          []          []          []          []          []          []          []

## 2021-08-04 ENCOUNTER — HOSPITAL ENCOUNTER (OUTPATIENT)
Dept: PHYSICAL THERAPY | Age: 51
Discharge: HOME | End: 2021-08-04
Payer: COMMERCIAL

## 2021-08-04 PROCEDURE — 97110 THERAPEUTIC EXERCISES: CPT

## 2021-08-04 NOTE — PROGRESS NOTES
Panda Chow  : 1970  Primary:   Secondary:  [address] at [facility] Therapy  [address], [address]  [phone]   [phone]         OUTPATIENT PHYSICAL THERAPY:Daily Note 2021      TREATMENT:     PT Patient Time In/Time Out  Time In: 0845  Time Out: 0930      Total Time: 45 min  Visit Count:  21     ICD-10: Treatment Diagnosis: M53.3, Z47.89, M67.951  Medication Last Reviewed: 21      TREATMENT PLAN  Effective Dates: 2021 TO 2021 (90 days).  Frequency/Duration: 2 times a week for 90 Day(s)         Subjective: Patient reports being really sore today.  Pain: 4/10    Objective: Therapeutic Exercise:  45min) Done in order to improve strength, ROM and understanding of current condition. Date:   Date   Date:   Date     Activity/Exercise       Education       NuStep x15min lvl 3 X 15 min level 3 x10min lvl 5 x10min lvl 5   Treadmill   x5min @ 1.5mph x5min @ 1.5mph   SLR  3 x 10  3 x 10   Hip abd         Knee to chest       Trunk rotation       clams  2 x10  3 x 10   Sit to stand  2 x 10  3 x 10   Hip flexor stretching       Bridges    2 x 10   Prone Hip Extension  3 x 10  3 x 10   Mini Squats       Hip flexor stretching   1 x 10  2 x 10   Quad stretching ( prone)  3 x 1 min  3 x 1 min       4 x 1 min                                        Aquatic Therapy: min) Done in order to improve strength, ROM and understanding of current condition.     Date  21 Date:   Date     Activity/Exercise      SLR   3 x 10   Hip Extension 2x15 B 2x15 B 2x15 B   Step Ups x30 (higher steps) x30 (middle steps) x30 (middle steps)   Hip ABD 2x15 B 2x15 B 2x15 B   Sidestepping 3x40ft     Walking      Marching      Leg Swings Flex --> Ext  3x15 B    Circles       SL Balance  x4min    bicycle      Hip flex /knee ext 2x10 B  2 x 10 B   squats 1 x 10  2 x 10   Quad stretching      Lateral Step Downs  3x15 B    Hip Flexion 2x15 B Jogging  [id number]    Hip flexor stretching 4 x 1 min  4 x 1 min   Modified quad stretching   3 x 1 min       Manual Therapy: ( 0min) Done in order to improve joint and soft tissue mobility,reduce muscle guarding, and decrease muscle tone   Date:  7/8 Date:   Date:   Date:     Type Parameters      Joint Mobilization       Soft Tissue Mobilization To quad and IT band          Modalities: (-) Done in order to reduce swelling and pain    Assessment: Patient continues to tolerate treatment well. Patient seemed to ambulate better after stretching. Patient hopes to do more walking over the weekend.     Plan: continue with aquatics    Future Appointments   Date Time Provider Felisha Jewell   8/5/2021 11:00 AM [facility]   8/6/2021  8:45 AM [facility]   8/9/2021  8:[facility]   8/11/2021  8:45 [facility]   8/13/2021  8:45 AM Wendy Krystina, PT [facility]   8/16/2021  8:45 AM Wendy Krystina, PT [facility]   8/17/2021  9:00 AM [address] [place] [address] Road 601 [facility]   8/17/2021  9:40 AM Caterina Ambriz MD [facility]   8/18/2021  8:45 AM Wendy Krystina, PT [facility]   8/20/2021  8:45 AM Wendy Krystina, PT [facility]   8/23/2021  8:45 AM Marie Brady [facility]   8/25/2021  8:45 AM Wendy Krystina, PT [facility]   8/27/2021  8:45 AM Wendy Krystina, PT [facility]   8/30/2021  8:45 AM Wendy Krystina, PT [facility]   9/1/2021  8:45 AM Wendy Krystina, PT [facility]   9/3/2021  8:45 AM Wendy Krystina, PT [facility]       Kenneth Raya Time:   Units:  3AE/ TE      Nils Goldberg, PTA    Visit Approval Visit # Therapist initials Date A NS / Cx < 24 hr >24 hr Cx Comments    1 WJ 5/28 [x]  [] [] Initial evaluation    2  6/1 [x] [] []     3  6/3 [x] [] []     4 [address] 6/8 [x] [] []     5 [address] 6/11 [x] [] []     6 [address] 6/15 [x] [] []     7 [address] 6-18 [x] [] []     [address] 6-22 [x] [] []     [address] 6-24 [x] [] []     [address] 6-29 [x] [] [] Progress Note    [address] 7-1 [x] [] []     [address] 7/8 [x] [] []     13  7/13 [x] [] []     14  7/19 [x] [] []     15  7/21 [x] [] []     16  7/22 [x] [] []     17 [address] 7/26 [x] [] []     18 [address] 7/28 [x] [] []     19 J 7/29 [x]       20 [address] 8/2 [x]       21 [address] 8/4 [x]          []          []          []          []          []          []          []

## 2021-08-05 ENCOUNTER — APPOINTMENT (OUTPATIENT)
Dept: PHYSICAL THERAPY | Age: 51
End: 2021-08-05
Payer: COMMERCIAL

## 2021-08-05 ENCOUNTER — HOSPITAL ENCOUNTER (OUTPATIENT)
Dept: PHYSICAL THERAPY | Age: 51
Discharge: HOME | End: 2021-08-05
Payer: COMMERCIAL

## 2021-08-05 PROCEDURE — 97113 AQUATIC THERAPY/EXERCISES: CPT

## 2021-08-05 NOTE — PROGRESS NOTES
Belle Aguiarconner  : 1970  Primary:   Secondary:  [address] at [facility] Therapy  [address], [facility], [address]  [phone]   [phone]         OUTPATIENT PHYSICAL THERAPY:Daily Note 2021      TREATMENT:     PT Patient Time In/Time Out  Time In: 1100  Time Out: 1155      Total Time: 55 min  Visit Count:  22     ICD-10: Treatment Diagnosis: M53.3, Z47.89, M67.951  Medication Last Reviewed: 21      TREATMENT PLAN  Effective Dates: 2021 TO 2021 (90 days).  Frequency/Duration: 2 times a week for 90 Day(s)         Subjective: Patient reports leg is feeling better.  Pain: 3/10    Objective: Therapeutic Exercise:  min) Done in order to improve strength, ROM and understanding of current condition. Date:   Date   Date:   Date     Activity/Exercise       Education       NuStep x15min lvl 3 X 15 min level 3 x10min lvl 5 x10min lvl 5   Treadmill   x5min @ 1.5mph x5min @ 1.5mph   SLR  3 x 10  3 x 10   Hip abd         Knee to chest       Trunk rotation       clams  2 x10  3 x 10   Sit to stand  2 x 10  3 x 10   Hip flexor stretching       Bridges    2 x 10   Prone Hip Extension  3 x 10  3 x 10   Mini Squats       Hip flexor stretching   1 x 10  2 x 10   Quad stretching ( prone)  3 x 1 min  3 x 1 min       4 x 1 min                                        Aquatic Therapy: 55: min) Done in order to improve strength, ROM and understanding of current condition.     Date:   Date   Date     Activity/Exercise      SLR  3 x 10 3 x 10   Hip Extension 2x15 B 2x15 B 2 x 15 B   Step Ups x30 (middle steps) x30 (middle steps) 2 x 30   Hip ABD 2x15 B 2x15 B 2 x 15 B   Sidestepping      Walking      Marching      Leg Swings Flex --> Ext 3x15 B  3 x 15   Circles       SL Balance x4min     bicycle   X 10 min   Hip flex /knee ext  2 x 10 B 3 x 10 B   squats  2 x 10 3 x 10   Quad stretching      Lateral Step Downs 3x15 B  3 x 15 B   Hip Flexion      Jogging [id number]     Hip flexor stretching  4 x 1 min 4 x 1 min   Modified quad stretching  3 x 1 min 4 x 1 min       Manual Therapy: ( 0min) Done in order to improve joint and soft tissue mobility,reduce muscle guarding, and decrease muscle tone   Date:  7/8 Date:   Date:   Date:     Type Parameters      Joint Mobilization       Soft Tissue Mobilization To quad and IT band          Modalities: (-) Done in order to reduce swelling and pain    Assessment: Patient continues to tolerate treatment well. Patient seems pleased with progress.      Plan: continue with aquatics    Future Appointments   Date Time Provider Felisha Jewell   8/6/2021  8:45 AM Zamzam Pain [facility]   8/9/2021  8:45 AM Zamzam Pain [facility]   8/11/2021  8:45 AM Zamzam Pain [facility]   8/13/2021  8:45 AM Jhonatan Bard, PT [facility]   8/16/2021  8:45 AM Jhonatan Bard, PT [facility]   8/17/2021  9:00 AM [address] [place] [address] 601 [facility]   8/17/2021  9:40 AM Saad Caba MD [facility]   8/18/2021  8:45 AM Jhonatan Bard, PT [facility]   8/20/2021  8:45 AM Jhonatan Bard, PT [facility]   8/23/2021  8:45 AM Zamzam Pain [facility]   8/25/2021  8:45 AM Jhonatan Bard, PT [facility]   8/27/2021  8:45 AM Jhonatan Bard, PT [facility]   8/30/2021  8:45 AM Zamzam Pain [facility]   9/1/2021  8:45 AM Jhonatan Bard, PT [facility]   9/3/2021  8:45 AM Jhonatan Bard, PT [facility]       Radha Adkins Time:   Units:  Adin 27, PTA    Visit Approval Visit # Therapist initials Date A NS / Cx < 24 hr >24 hr Cx Comments    1 WJ 5/28 [x]  [] [] Initial evaluation    2 W 6/1 [x] [] []     3 WJ 6/3 [x] [] []     4 [address] 6/8 [x] [] []     5 [address] 6/11 [x] [] []     6 [address] 6/15 [x] [] []     7 [address] 6-18 [x] [] []     8 [address] 6-22 [x] [] []     9 [address] 6-24 [x] [] []     10 [address] 6-29 [x] [] [] Progress Note    11  7-1 [x] [] []     12 [address] 7/8 [x] [] []     13  7/13 [x] [] []     14 Johanne Pollen 7/19 [x] [] []     15  7/21 [x] [] []     16  7/22 [x] [] []     17 [address] 7/26 [x] [] []     18 [address] 7/28 [x] [] []     19  7/29 [x]       20 [address] 8/2 [x]       21 [address] 8/4 [x]       22 [address] 8/5 [x]          []          []          []          []          []          []

## 2021-08-06 ENCOUNTER — HOSPITAL ENCOUNTER (OUTPATIENT)
Dept: PHYSICAL THERAPY | Age: 51
Discharge: HOME | End: 2021-08-06
Payer: COMMERCIAL

## 2021-08-06 NOTE — PROGRESS NOTES
Mike Kaur  : 1970  Primary: [facility]  Secondary:  [address] at [facility] Therapy  6[address] [facility], [facility], [address]  [phone]   [phone]      OUTPATIENT DAILY NOTE    NAME/AGE/GENDER: Mike Kaur is a 48 y.o. female. DATE: 2021    Ms. Cammy Cox for today's appointment due to being out of town. Dione Mendoza, PTA

## 2021-08-09 ENCOUNTER — HOSPITAL ENCOUNTER (OUTPATIENT)
Dept: PHYSICAL THERAPY | Age: 51
Discharge: HOME | End: 2021-08-09
Payer: COMMERCIAL

## 2021-08-09 PROCEDURE — 97113 AQUATIC THERAPY/EXERCISES: CPT

## 2021-08-09 NOTE — PROGRESS NOTES
Jamee Ojedacon  : 1970  Primary:   Secondary:  [address] at [facility]  [address], [address]  [phone]   [phone]         OUTPATIENT PHYSICAL THERAPY:Daily Note 2021      TREATMENT:      PT Patient Time In/Time Out  Time In: 0845  Time Out: 940      Total Time: 55 min  Visit Count:  23     ICD-10: Treatment Diagnosis: M53.3, Z47.89, M67.951  Medication Last Reviewed: 21      TREATMENT PLAN  Effective Dates: 2021 TO 2021 (90 days).  Frequency/Duration: 2 times a week for 90 Day(s)         Subjective: Patient reports she did a lot of walking over the weekend.  Pain: 3/10    Objective: Therapeutic Exercise:  min) Done in order to improve strength, ROM and understanding of current condition. Date:   Date   Date:   Date     Activity/Exercise       Education       NuStep x15min lvl 3 X 15 min level 3 x10min lvl 5 x10min lvl 5   Treadmill   x5min @ 1.5mph x5min @ 1.5mph   SLR  3 x 10  3 x 10   Hip abd         Knee to chest       Trunk rotation       clams  2 x10  3 x 10   Sit to stand  2 x 10  3 x 10   Hip flexor stretching       Bridges    2 x 10   Prone Hip Extension  3 x 10  3 x 10   Mini Squats       Hip flexor stretching   1 x 10  2 x 10   Quad stretching ( prone)  3 x 1 min  3 x 1 min       4 x 1 min                                        Aquatic Therapy: 55: min) Done in order to improve strength, ROM and understanding of current condition.     Date:   Date   Date   Date  21   Activity/Exercise       SLR  3 x 10 3 x 10 3 x 10   Hip Extension 2x15 B 2x15 B 2 x 15 B 2 x 15 B   Step Ups x30 (middle steps) x30 (middle steps) 2 x 30 3 x 30    Hip ABD 2x15 B 2x15 B 2 x 15 B 2 x 15 B   Sidestepping    3 x 10   Walking       Marching       Leg Swings Flex --> Ext 3x15 B  3 x 15 3 x 15   Circles        SL Balance x4min      bicycle   X 10 min X 10 min   Hip flex /knee ext  2 x 10 B 3 x 10 B 3 x 10 B   squats  2 x 10 3 x 10 3 x 10   Quad stretching       Lateral Step Downs 3x15 B  3 x 15 B 3 x 15 B   Hip Flexion       Jogging [id number]      Hip flexor stretching  4 x 1 min 4 x 1 min 4 x 1 min   Modified quad stretching  3 x 1 min 4 x 1 min 4 x 1 min       Manual Therapy: ( 0min) Done in order to improve joint and soft tissue mobility,reduce muscle guarding, and decrease muscle tone   Date:  7/8 Date:   Date:   Date:     Type Parameters      Joint Mobilization       Soft Tissue Mobilization To quad and IT band          Modalities: (-) Done in order to reduce swelling and pain    Assessment: Patient continues to tolerate treatment well. Patient continues to pleased with her progress and indicates less discomfort.     Plan: continue with aquatics    Future Appointments   Date Time Provider Felisha Jewell   8/11/2021  8:45 AM [facility]   8/13/2021  8:45 AM [facility]   8/16/2021  8:45 AM Wendy Krystina, PT [facility]   8/17/2021  9:00 AM [address] [place] [address] [address] [facility]   8/17/2021  9:40 AM Caterina Ambriz MD [facility]   8/18/2021  8:45 AM Wendy Krystina, PT [facility]   8/20/2021  8:45 AM Wendy Krystina, PT [facility]   8/23/2021  8:45 AM Marie Brady [facility]   8/25/2021  8:45 AM Wendy Krystina, PT [facility]   8/27/2021  8:45 AM Wendy Krystina, PT [facility]   8/30/2021  8:45 AM Marie Brady [facility]   9/1/2021  8:45 AM Wendy Krystina, PT [facility]   9/3/2021  8:45 AM Wendy Krystina, PT [facility]       Kenneth Raya Time:   Units:  Adin 27, PTA    Visit Approval Visit # Therapist initials Date A NS / Cx < 24 hr >24 hr Cx Comments    1 WJ 5/28 [x]  [] [] Initial evaluation    2 WJ 6/1 [x] [] []     3 WJ 6/3 [x] [] []     4 [address] 6/8 [x] [] []     5 [address] 6/11 [x] [] []     6 [address] 6/15 [x] [] []     7 [address] 6-18 [x] [] []     8 [address] 6-22 [x] [] [] 9  6-24 [x] [] []     [address] 6-29 [x] [] [] Progress Note    [address] 7-1 [x] [] []     [address] 7/8 [x] [] []     13  7/13 [x] [] []     14 Dale Rincon 7/19 [x] [] []     15  7/21 [x] [] []     16  7/22 [x] [] []     [address] 7/26 [x] [] []     [address] 7/28 [x] [] []     19  7/29 [x]       [address] 8/2 [x]       [address] 8/4 [x]       [address] 8/5 [x]       [address] 8/9 [x]          []          []          []          []          []

## 2021-08-11 ENCOUNTER — HOSPITAL ENCOUNTER (OUTPATIENT)
Dept: PHYSICAL THERAPY | Age: 51
Discharge: HOME | End: 2021-08-11
Payer: COMMERCIAL

## 2021-08-11 PROCEDURE — 97110 THERAPEUTIC EXERCISES: CPT

## 2021-08-11 NOTE — PROGRESS NOTES
Belle Aguiarconner  : 1970  Primary:   Secondary:  [address] at [place] [address] Therapy  [address], [address]  [phone]   [phone]         OUTPATIENT PHYSICAL THERAPY:Daily Note 2021      TREATMENT:      PT Patient Time In/Time Out  Time In: 0845  Time Out: 930      Total Time: 55 min  Visit Count:  24     ICD-10: Treatment Diagnosis: M53.3, Z47.89, M67.951  Medication Last Reviewed: 21      TREATMENT PLAN  Effective Dates: 2021 TO 2021 (90 days).  Frequency/Duration: 2 times a week for 90 Day(s)         Subjective: Patient reports feeling ok today.  Pain: 3/10    Objective: Therapeutic Exercise: 45:  min) Done in order to improve strength, ROM and understanding of current condition. Date:  7/29 Date  8/4 Date  21   Activity/Exercise      Education      NuStep x10min lvl 5 x10min lvl 5    Treadmill x5min @ 1.5mph x5min @ 1.5mph x5min @ 1.5mph   SLR  3 x 10 3 x 10   Hip abd     3 x10   Knee to chest      Trunk rotation      clams  3 x 10 3 x10   Sit to stand  3 x 10 3 x 10   Hip flexor stretching      Bridges  2 x 10    Prone Hip Extension  3 x 10 3 x 10   Mini Squats      Hip flexor stretching   2 x 10    Quad stretching ( prone)  3 x 1 min 3 x 1 min   3 way hip machine   4 x 1 min 4 x 1 min      Abd/flex # 15 3 x10                             Aquatic Therapy: : min) Done in order to improve strength, ROM and understanding of current condition.     Date:   Date   Date   Date  21   Activity/Exercise       SLR  3 x 10 3 x 10 3 x 10   Hip Extension 2x15 B 2x15 B 2 x 15 B 2 x 15 B   Step Ups x30 (middle steps) x30 (middle steps) 2 x 30 3 x 30    Hip ABD 2x15 B 2x15 B 2 x 15 B 2 x 15 B   Sidestepping    3 x 10   Walking       Marching       Leg Swings Flex --> Ext 3x15 B  3 x 15 3 x 15   Circles        SL Balance x4min      bicycle   X 10 min X 10 min   Hip flex /knee ext  2 x 10 B 3 x 10 B 3 x 10 B   squats  2 x 10 3 x 10 3 x 10   Quad stretching       Lateral Step Downs 3x15 B  3 x 15 B 3 x 15 B   Hip Flexion       Jogging [id number]      Hip flexor stretching  4 x 1 min 4 x 1 min 4 x 1 min   Modified quad stretching  3 x 1 min 4 x 1 min 4 x 1 min       Manual Therapy: ( 0min) Done in order to improve joint and soft tissue mobility,reduce muscle guarding, and decrease muscle tone   Date:  7/8 Date:   Date:   Date:     Type Parameters      Joint Mobilization       Soft Tissue Mobilization To quad and IT band          Modalities: (-) Done in order to reduce swelling and pain    Assessment: Patient continues to tolerate treatment well. Patient continues to pleased with her progress and indicates less discomfort.  Good improvement in ambulation    Plan: continue with aquatics    Future Appointments   Date Time Provider Felisha Jewell   8/13/2021  8:45 AM [facility]   8/16/2021  8:45 AM Lodema Nab, PT [facility]   8/17/2021  9:00  [facility] [address] [facility]   8/17/2021  9:40 AM Bekah Umanzor MD [facility]   8/18/2021  8:45 AM Lodema Nab, PT [facility]   8/20/2021  8:45 AM Lodema Nab, PT [facility]   8/23/2021  8:45 AM Jabier Vincent [facility]   8/25/2021  8:45 AM Lodema Nab, PT [facility]   8/27/2021  8:45 AM Lodema Nab, PT [facility]   8/30/2021  8:45 AM Jabier Vincent [facility]   9/1/2021  8:45 AM Lodema Nab, PT [facility]   9/3/2021  8:45 AM Lodema Nab, PT [facility]       Antonette Zimmer Time:   Units:   AE/ 3 KEAGAN Alvarado PTA    Visit Approval Visit # Therapist initials Date A NS / Cx < 24 hr >24 hr Cx Comments    1 W 5/28 [x]  [] [] Initial evaluation    2 W 6/1 [x] [] []     3 W 6/3 [x] [] []     4 [address] 6/8 [x] [] []     5 [address] 6/11 [x] [] []     [address] 6/15 [x] [] []     7 [address] 6-18 [x] [] []     8 [address] 6-22 [x] [] []     [address] 6-24 [x] [] []     [address] 6-29 [x] [] [] Progress Note    11  7-1 [x] [] []     [address] 7/8 [x] [] []     13 W 7/13 [x] [] []     14  7/19 [x] [] []     15  7/21 [x] [] []     16  7/22 [x] [] []     [address] 7/26 [x] [] []     [address] 7/28 [x] [] []     19  7/29 [x]       [address] 8/2 [x]       [address] 8/4 [x]       [address] 8/5 [x]       [address] 8/9 [x]       [address] 8/11 [x]          []          []          []          []

## 2021-08-12 ENCOUNTER — HOSPITAL ENCOUNTER (EMERGENCY)
Age: 51
Discharge: HOME | End: 2021-08-13
Payer: COMMERCIAL

## 2021-08-12 DIAGNOSIS — R07.89 ATYPICAL CHEST PAIN: Primary | ICD-10-CM

## 2021-08-12 DIAGNOSIS — R05.9 COUGH: ICD-10-CM

## 2021-08-12 LAB
ALBUMIN SERPL-MCNC: 3.6 G/DL (ref 3.5–5)
ALBUMIN/GLOB SERPL: 0.9 {RATIO} (ref 1.2–3.5)
ALP SERPL-CCNC: 96 U/L (ref 50–136)
ALT SERPL-CCNC: 24 U/L (ref 12–65)
ANION GAP SERPL CALC-SCNC: 6 MMOL/L (ref 7–16)
AST SERPL-CCNC: 16 U/L (ref 15–37)
BASOPHILS # BLD: 0.1 K/UL (ref 0–0.2)
BASOPHILS NFR BLD: 1 % (ref 0–2)
BILIRUB SERPL-MCNC: 0.3 MG/DL (ref 0.2–1.1)
BUN SERPL-MCNC: 14 MG/DL (ref 6–23)
CALCIUM SERPL-MCNC: 9.5 MG/DL (ref 8.3–10.4)
CHLORIDE SERPL-SCNC: 108 MMOL/L (ref 98–107)
CO2 SERPL-SCNC: 27 MMOL/L (ref 21–32)
CREAT SERPL-MCNC: 0.89 MG/DL (ref 0.6–1)
DIFFERENTIAL METHOD BLD: NORMAL
EOSINOPHIL # BLD: 0.1 K/UL (ref 0–0.8)
EOSINOPHIL NFR BLD: 1 % (ref 0.5–7.8)
ERYTHROCYTE [DISTWIDTH] IN BLOOD BY AUTOMATED COUNT: 13.8 % (ref 11.9–14.6)
GLOBULIN SER CALC-MCNC: 4.1 G/DL (ref 2.3–3.5)
GLUCOSE SERPL-MCNC: 102 MG/DL (ref 65–100)
HCT VFR BLD AUTO: 44 % (ref 35.8–46.3)
HGB BLD-MCNC: 14.4 G/DL (ref 11.7–15.4)
IMM GRANULOCYTES # BLD AUTO: 0 K/UL (ref 0–0.5)
IMM GRANULOCYTES NFR BLD AUTO: 1 % (ref 0–5)
LIPASE SERPL-CCNC: 244 U/L (ref 73–393)
LYMPHOCYTES # BLD: 1.7 K/UL (ref 0.5–4.6)
LYMPHOCYTES NFR BLD: 22 % (ref 13–44)
MAGNESIUM SERPL-MCNC: 2 MG/DL (ref 1.8–2.4)
MCH RBC QN AUTO: 28 PG (ref 26.1–32.9)
MCHC RBC AUTO-ENTMCNC: 32.7 G/DL (ref 31.4–35)
MCV RBC AUTO: 85.4 FL (ref 79.6–97.8)
MONOCYTES # BLD: 0.4 K/UL (ref 0.1–1.3)
MONOCYTES NFR BLD: 6 % (ref 4–12)
NEUTS SEG # BLD: 5.3 K/UL (ref 1.7–8.2)
NEUTS SEG NFR BLD: 69 % (ref 43–78)
NRBC # BLD: 0 K/UL (ref 0–0.2)
PLATELET # BLD AUTO: 252 K/UL (ref 150–450)
PMV BLD AUTO: 9.7 FL (ref 9.4–12.3)
POTASSIUM SERPL-SCNC: 3.6 MMOL/L (ref 3.5–5.1)
PROT SERPL-MCNC: 7.7 G/DL (ref 6.3–8.2)
RBC # BLD AUTO: 5.15 M/UL (ref 4.05–5.2)
SODIUM SERPL-SCNC: 141 MMOL/L (ref 136–145)
TROPONIN-HIGH SENSITIVITY: 9.8 PG/ML (ref 0–14)
WBC # BLD AUTO: 7.7 K/UL (ref 4.3–11.1)

## 2021-08-12 PROCEDURE — 96374 THER/PROPH/DIAG INJ IV PUSH: CPT

## 2021-08-12 PROCEDURE — 83735 ASSAY OF MAGNESIUM: CPT

## 2021-08-12 PROCEDURE — 85025 COMPLETE CBC W/AUTO DIFF WBC: CPT

## 2021-08-12 PROCEDURE — 80053 COMPREHEN METABOLIC PANEL: CPT

## 2021-08-12 PROCEDURE — 83690 ASSAY OF LIPASE: CPT

## 2021-08-12 PROCEDURE — 84484 ASSAY OF TROPONIN QUANT: CPT

## 2021-08-12 PROCEDURE — 93005 ELECTROCARDIOGRAM TRACING: CPT

## 2021-08-12 PROCEDURE — 99284 EMERGENCY DEPT VISIT MOD MDM: CPT

## 2021-08-12 RX ORDER — SODIUM CHLORIDE 0.9 % (FLUSH) 0.9 %
5-10 SYRINGE (ML) INJECTION AS NEEDED
Status: DISCONTINUED | OUTPATIENT
Start: 2021-08-12 | End: 2021-08-13 | Stop reason: HOSPADM

## 2021-08-12 RX ORDER — SODIUM CHLORIDE 0.9 % (FLUSH) 0.9 %
5-10 SYRINGE (ML) INJECTION EVERY 8 HOURS
Status: DISCONTINUED | OUTPATIENT
Start: 2021-08-12 | End: 2021-08-13 | Stop reason: HOSPADM

## 2021-08-13 ENCOUNTER — HOSPITAL ENCOUNTER (OUTPATIENT)
Dept: PHYSICAL THERAPY | Age: 51
Discharge: HOME | End: 2021-08-13
Payer: COMMERCIAL

## 2021-08-13 VITALS
TEMPERATURE: 98.5 F | DIASTOLIC BLOOD PRESSURE: 62 MMHG | RESPIRATION RATE: 14 BRPM | SYSTOLIC BLOOD PRESSURE: 129 MMHG | WEIGHT: 210 LBS | HEIGHT: 66 IN | OXYGEN SATURATION: 100 % | BODY MASS INDEX: 33.75 KG/M2 | HEART RATE: 55 BPM

## 2021-08-13 LAB
ATRIAL RATE: 63 BPM
CALCULATED P AXIS, ECG09: 71 DEGREES
CALCULATED R AXIS, ECG10: 82 DEGREES
CALCULATED T AXIS, ECG11: 49 DEGREES
DIAGNOSIS, 93000: NORMAL
P-R INTERVAL, ECG05: 140 MS
Q-T INTERVAL, ECG07: 392 MS
QRS DURATION, ECG06: 88 MS
QTC CALCULATION (BEZET), ECG08: 401 MS
TROPONIN-HIGH SENSITIVITY: 9.8 PG/ML (ref 0–14)
VENTRICULAR RATE, ECG03: 63 BPM

## 2021-08-13 PROCEDURE — 97110 THERAPEUTIC EXERCISES: CPT

## 2021-08-13 PROCEDURE — 74011250636 HC RX REV CODE- 250/636

## 2021-08-13 PROCEDURE — 74011000250 HC RX REV CODE- 250

## 2021-08-13 PROCEDURE — 84484 ASSAY OF TROPONIN QUANT: CPT

## 2021-08-13 PROCEDURE — 97113 AQUATIC THERAPY/EXERCISES: CPT

## 2021-08-13 RX ORDER — PREDNISONE 10 MG/1
TABLET ORAL
Qty: 21 TABLET | Refills: 0 | Status: SHIPPED | OUTPATIENT
Start: 2021-08-13 | End: 2022-03-09

## 2021-08-13 RX ORDER — PANTOPRAZOLE SODIUM 40 MG/1
40 TABLET, DELAYED RELEASE ORAL DAILY
Qty: 20 TABLET | Refills: 0 | Status: SHIPPED | OUTPATIENT
Start: 2021-08-13 | End: 2021-09-02

## 2021-08-13 RX ADMIN — FAMOTIDINE 20 MG: 10 INJECTION, SOLUTION INTRAVENOUS at 00:29

## 2021-08-13 NOTE — ED NOTES
I have reviewed discharge instructions with the patient. The patient verbalized understanding. Patient left ED via Discharge Method: ambulatory to Home with self. Opportunity for questions and clarification provided. Patient given 2 scripts. To continue your aftercare when you leave the hospital, you may receive an automated call from our care team to check in on how you are doing. This is a free service and part of our promise to provide the best care and service to meet your aftercare needs.  If you have questions, or wish to unsubscribe from this service please call [phone]. Thank you for Choosing our [facility] Emergency Department.

## 2021-08-13 NOTE — PROGRESS NOTES
Laura Knee  : 1970  Primary:   Secondary:  [address] [place]  at [place] [address] Therapy  [address], [address]  [phone]   [phone]         OUTPATIENT PHYSICAL THERAPY:Daily Note 2021      TREATMENT:      PT Patient Time In/Time Out  Time In: 45  Time Out: 45      Total Time: 60 min  Visit Count:  25     ICD-10: Treatment Diagnosis: M53.3, Z47.89, M67.951  Medication Last Reviewed: 21      TREATMENT PLAN  Effective Dates: 2021 TO 2021 (90 days).  Frequency/Duration: 2 times a week for 90 Day(s)         Subjective: Patient reports feeling ok today.  Pain: 3/10    Objective: Therapeutic Exercise: 15:  min) Done in order to improve strength, ROM and understanding of current condition. Date:   Date   Date  21 Date  21   Activity/Exercise       Education       NuStep x10min lvl 5 x10min lvl 5     Treadmill x5min @ 1.5mph x5min @ 1.5mph x5min @ 1.5mph X 10 min @ 2.0mph   SLR  3 x 10 3 x 10    Hip abd     3 x10    Knee to chest       Trunk rotation       clams  3 x 10 3 x10    Sit to stand  3 x 10 3 x 10    Hip flexor stretching       Bridges  2 x 10     Prone Hip Extension  3 x 10 3 x 10    Mini Squats       Hip flexor stretching   2 x 10     Quad stretching ( prone)  3 x 1 min 3 x 1 min    3 way hip machine   4 x 1 min 4 x 1 min       Abd/flex # 15 3 x10                                  Aquatic Therapy: 45 : min) Done in order to improve strength, ROM and understanding of current condition.     Date   Date   Date  21 Date  21   Activity/Exercise       SLR 3 x 10 3 x 10 3 x 10 3 x 10   Hip Extension 2x15 B 2 x 15 B 2 x 15 B 2 x 15 B   Step Ups x30 (middle steps) 2 x 30 3 x 30  3 x 30   Hip ABD 2x15 B 2 x 15 B 2 x 15 B 2 x 15 B   Sidestepping   3 x 10    Walking       Marching       Leg Swings Flex --> Ext  3 x 15 3 x 15    Circles        SL Balance       bicycle  X 10 min X 10 min  x 10 min   Hip flex /knee ext 2 x 10 B 3 x 10 B 3 x 10 B 3 x 10 B   squats 2 x 10 3 x 10 3 x 10 3 x 10   Quad stretching       Lateral Step Downs  3 x 15 B 3 x 15 B 3 x 5 B   Hip Flexion       Jogging    4 min   Hip flexor stretching 4 x 1 min 4 x 1 min 4 x 1 min 4 x 1 min   Modified quad stretching 3 x 1 min 4 x 1 min 4 x 1 min 4 x 1 min   Lateral Lunges    2 x 10       Manual Therapy: ( 0min) Done in order to improve joint and soft tissue mobility,reduce muscle guarding, and decrease muscle tone   Date:  7/8 Date:   Date:   Date:     Type Parameters      Joint Mobilization       Soft Tissue Mobilization To quad and IT band          Modalities: (-) Done in order to reduce swelling and pain    Assessment: Patient continues to tolerate treatment well. Patient continues to pleased with her progress and indicates less discomfort. Patient indicates she has been walking more and leg is feeling stronger.     Plan: continue with aquatics    Future Appointments   Date Time Provider Felisha Jewell   8/16/2021  8:45 AM Mellissa Oz, PT [facility]   8/17/2021  9:00 AM [address] [place] [address] [address] [place] [address]   8/17/2021  9:40 AM Lm Murphy MD [facility]   8/18/2021  8:45 AM Mellissa Oz, PT [facility]   8/20/2021  8:45 AM Mellissa Oz, PT [facility]   8/23/2021  8:45 AM Karenann Mitts [facility]   8/25/2021  8:45 AM Mellissa Oz, PT [facility]   8/27/2021  8:45 AM Mellissa Oz, PT [facility]   8/30/2021  8:45 AM Karenann Mitts [facility]   9/1/2021  8:45 AM Mellissa Oz, PT [facility]   9/3/2021  8:45 AM Mellissa Oz, PT [facility]       Nuria Tompkins Time:   Units:   AE/ 3  [facility]    Visit Approval Visit # Therapist initials Date A NS / Cx < 24 hr >24 hr Cx Comments    1 W 5/28 [x]  [] [] Initial evaluation    2 W 6/1 [x] [] []     3 W 6/3 [x] [] []     4 [facility] 6/8 [x] [] []     5 [facility] 6/11 [x] [] []     6 [facility] 6/15 [x] [] []     7 [facility] 6-18 [x] [] []     8 [facility] 6-22 [x] [] []     [address] 6-24 [x] [] []     [address] 6-29 [x] [] [] Progress Note    [address] 7-1 [x] [] []     [address] 7/8 [x] [] []     13  7/13 [x] [] []     14  7/19 [x] [] []     15  7/21 [x] [] []     16  7/22 [x] [] []     17 [address] 7/26 [x] [] []     [address] 7/28 [x] [] []     19  7/29 [x]       [address] 8/2 [x]       [address] 8/4 [x]       [address] 8/5 [x]       [address] 8/9 [x]       [address] 8/11 [x]       [address] 8/13 [x]          []          []          []

## 2021-08-13 NOTE — ED TRIAGE NOTES
Patient ambulatory to triage with steady gait. States that she had onset of chest pain, shortness of breath and cough yesterday. Patient states that she is also having nausea.

## 2021-08-14 ENCOUNTER — APPOINTMENT (OUTPATIENT)
Dept: GENERAL RADIOLOGY | Age: 51
End: 2021-08-14
Attending: EMERGENCY MEDICINE
Payer: COMMERCIAL

## 2021-08-14 ENCOUNTER — HOSPITAL ENCOUNTER (EMERGENCY)
Age: 51
Discharge: HOME | End: 2021-08-15
Attending: EMERGENCY MEDICINE
Payer: COMMERCIAL

## 2021-08-14 DIAGNOSIS — R07.9 CHEST PAIN, UNSPECIFIED TYPE: Primary | ICD-10-CM

## 2021-08-14 LAB
ALBUMIN SERPL-MCNC: 3.9 G/DL (ref 3.5–5)
ALBUMIN/GLOB SERPL: 0.9 {RATIO} (ref 1.2–3.5)
ALP SERPL-CCNC: 96 U/L (ref 50–136)
ALT SERPL-CCNC: 25 U/L (ref 12–65)
ANION GAP SERPL CALC-SCNC: 6 MMOL/L (ref 7–16)
AST SERPL-CCNC: 29 U/L (ref 15–37)
BASOPHILS # BLD: 0 K/UL (ref 0–0.2)
BASOPHILS NFR BLD: 0 % (ref 0–2)
BILIRUB SERPL-MCNC: 0.4 MG/DL (ref 0.2–1.1)
BUN SERPL-MCNC: 14 MG/DL (ref 6–23)
CALCIUM SERPL-MCNC: 9.2 MG/DL (ref 8.3–10.4)
CHLORIDE SERPL-SCNC: 109 MMOL/L (ref 98–107)
CO2 SERPL-SCNC: 26 MMOL/L (ref 21–32)
CREAT SERPL-MCNC: 1.02 MG/DL (ref 0.6–1)
DIFFERENTIAL METHOD BLD: ABNORMAL
EOSINOPHIL # BLD: 0.1 K/UL (ref 0–0.8)
EOSINOPHIL NFR BLD: 1 % (ref 0.5–7.8)
ERYTHROCYTE [DISTWIDTH] IN BLOOD BY AUTOMATED COUNT: 13.9 % (ref 11.9–14.6)
GLOBULIN SER CALC-MCNC: 4.2 G/DL (ref 2.3–3.5)
GLUCOSE SERPL-MCNC: 98 MG/DL (ref 65–100)
HCT VFR BLD AUTO: 46.1 % (ref 35.8–46.3)
HGB BLD-MCNC: 15 G/DL (ref 11.7–15.4)
IMM GRANULOCYTES # BLD AUTO: 0 K/UL (ref 0–0.5)
IMM GRANULOCYTES NFR BLD AUTO: 1 % (ref 0–5)
LIPASE SERPL-CCNC: 237 U/L (ref 73–393)
LYMPHOCYTES # BLD: 1.6 K/UL (ref 0.5–4.6)
LYMPHOCYTES NFR BLD: 19 % (ref 13–44)
MAGNESIUM SERPL-MCNC: 2.2 MG/DL (ref 1.8–2.4)
MCH RBC QN AUTO: 27.9 PG (ref 26.1–32.9)
MCHC RBC AUTO-ENTMCNC: 32.5 G/DL (ref 31.4–35)
MCV RBC AUTO: 85.7 FL (ref 79.6–97.8)
MONOCYTES # BLD: 0.4 K/UL (ref 0.1–1.3)
MONOCYTES NFR BLD: 5 % (ref 4–12)
NEUTS SEG # BLD: 5.9 K/UL (ref 1.7–8.2)
NEUTS SEG NFR BLD: 73 % (ref 43–78)
NRBC # BLD: 0 K/UL (ref 0–0.2)
PLATELET # BLD AUTO: 287 K/UL (ref 150–450)
PMV BLD AUTO: 10.4 FL (ref 9.4–12.3)
POTASSIUM SERPL-SCNC: 4.3 MMOL/L (ref 3.5–5.1)
PROT SERPL-MCNC: 8.1 G/DL (ref 6.3–8.2)
RBC # BLD AUTO: 5.38 M/UL (ref 4.05–5.2)
SODIUM SERPL-SCNC: 141 MMOL/L (ref 136–145)
TROPONIN-HIGH SENSITIVITY: 5.8 PG/ML (ref 0–14)
WBC # BLD AUTO: 7.9 K/UL (ref 4.3–11.1)

## 2021-08-14 PROCEDURE — 83735 ASSAY OF MAGNESIUM: CPT

## 2021-08-14 PROCEDURE — 84484 ASSAY OF TROPONIN QUANT: CPT

## 2021-08-14 PROCEDURE — 85025 COMPLETE CBC W/AUTO DIFF WBC: CPT

## 2021-08-14 PROCEDURE — 93005 ELECTROCARDIOGRAM TRACING: CPT

## 2021-08-14 PROCEDURE — 99284 EMERGENCY DEPT VISIT MOD MDM: CPT

## 2021-08-14 PROCEDURE — 83690 ASSAY OF LIPASE: CPT

## 2021-08-14 PROCEDURE — 80053 COMPREHEN METABOLIC PANEL: CPT

## 2021-08-14 PROCEDURE — 71046 X-RAY EXAM CHEST 2 VIEWS: CPT

## 2021-08-14 RX ORDER — SODIUM CHLORIDE 0.9 % (FLUSH) 0.9 %
5-10 SYRINGE (ML) INJECTION EVERY 8 HOURS
Status: DISCONTINUED | OUTPATIENT
Start: 2021-08-14 | End: 2021-08-15 | Stop reason: HOSPADM

## 2021-08-14 RX ORDER — SODIUM CHLORIDE 0.9 % (FLUSH) 0.9 %
5-10 SYRINGE (ML) INJECTION AS NEEDED
Status: DISCONTINUED | OUTPATIENT
Start: 2021-08-14 | End: 2021-08-15 | Stop reason: HOSPADM

## 2021-08-15 VITALS
HEART RATE: 60 BPM | WEIGHT: 210 LBS | RESPIRATION RATE: 20 BRPM | OXYGEN SATURATION: 99 % | BODY MASS INDEX: 33.75 KG/M2 | HEIGHT: 66 IN | SYSTOLIC BLOOD PRESSURE: 140 MMHG | TEMPERATURE: 97.3 F | DIASTOLIC BLOOD PRESSURE: 64 MMHG

## 2021-08-15 LAB
ATRIAL RATE: 64 BPM
CALCULATED P AXIS, ECG09: 65 DEGREES
CALCULATED R AXIS, ECG10: 80 DEGREES
CALCULATED T AXIS, ECG11: 55 DEGREES
DIAGNOSIS, 93000: NORMAL
P-R INTERVAL, ECG05: 140 MS
Q-T INTERVAL, ECG07: 394 MS
QRS DURATION, ECG06: 82 MS
QTC CALCULATION (BEZET), ECG08: 406 MS
VENTRICULAR RATE, ECG03: 64 BPM

## 2021-08-15 NOTE — ED PROVIDER NOTES
Patient has a past medical history of hypertension and migraines complaining of chest pain since Thursday. She states it has been constant midsternal nonradiating sharp pain which gets severe at times. She denies any aggravating or alleviating factors, states the pain seems to wax and wane without any pattern. She has some mild occasional shortness of breath, denies any nausea or diaphoresis. She has had similar pain in the past, was seen by [facility] approximately 10 years ago. She states she stayed in the hospital for several days and had some sort of work-up though she does not know what. She was seen here 2 days ago at the onset of her symptoms, had a negative work-up and was discharged home.            Past Medical History:   Diagnosis Date    Chlamydia     COVID-19 vaccine series completed 04/20/2021    Pfizer vaccine     Depression 08/16/2011    Managed with meds     Endometriosis     Gonorrhea     H/O echocardiogram     Herpes     Hypertension     Managed with meds     Migraines     PID (acute pelvic inflammatory disease)     Puberty     menarche 15 yo       Past Surgical History:   Procedure Laterality Date    COLONOSCOPY N/A 6/21/2021    COLONOSCOPY/ 38 performed by Laurent Carrillo MD at [address]      HX OTHER SURGICAL  2020    Hip surgery:  repaired a tear, reapproximated gluteal muscle    HX PELVIC LAPAROSCOPY  1993    endometriosis         Family History:   Problem Relation Age of Onset    Heart Disease Mother     Hypertension Mother     Diabetes Mother    [employer] Breast Cancer Mother 48        chemo and radiation    Hypertension Father     No Known Problems Sister     No Known Problems Sister     No Known Problems Maternal Grandmother     Stomach Cancer Maternal Grandfather     Hypertension Paternal Grandmother     Cancer Paternal Grandfather     Ovarian Cancer Neg Hx     Colon Cancer Neg Hx     Prostate Cancer Neg Hx  Deep Vein Thrombosis Neg Hx     Pulmonary Embolism Neg Hx        Social History     Socioeconomic History    Marital status: SINGLE     Spouse name: Not on file    Number of children: Not on file    Years of education: Not on file    Highest education level: Not on file   Occupational History    Not on file   Tobacco Use    Smoking status: Never Smoker    Smokeless tobacco: Never Used   Vaping Use    Vaping Use: Never used   Substance and Sexual Activity    Alcohol use: No     Alcohol/week: 0.0 standard drinks    Drug use: No    Sexual activity: Not Currently   Other Topics Concern    Not on file   Social History Narrative    1. PCP:  Frandy Okeefe NP     Social Determinants of Health     Financial Resource Strain:     Difficulty of Paying Living Expenses:    Food Insecurity:     Worried About [address] in the Last Year:     [address] in the Last Year:    Transportation Needs:     Lack of Transportation (Medical):  Lack of Transportation (Non-Medical):    Physical Activity:     Days of Exercise per Week:     Minutes of Exercise per Session:    Stress:     Feeling of Stress :    Social Connections:     Frequency of Communication with Friends and Family:     Frequency of Social Gatherings with Friends and Family:     Attends Judaism Services:     Active Member of Clubs or Organizations:     Attends Club or Organization Meetings:     Marital Status:    Intimate Partner Violence:     Fear of Current or Ex-Partner:     Emotionally Abused:     Physically Abused:     Sexually Abused: ALLERGIES: Latex, natural rubber    Review of Systems   Constitutional: Negative for chills and fever. Gastrointestinal: Negative for nausea and vomiting. All other systems reviewed and are negative.       Vitals:    08/14/21 2140   BP: (!) 147/76   Pulse: 65   Resp: 20   Temp: 97.3 °F (36.3 °C)   SpO2: 96%   Weight: 95.3 kg (210 lb)   Height: 5' 6\" (1.676 m) Physical Exam  Vitals and nursing note reviewed. Constitutional:       Appearance: Normal appearance. She is well-developed. HENT:      Head: Normocephalic and atraumatic. Eyes:      Conjunctiva/sclera: Conjunctivae normal.      Pupils: Pupils are equal, round, and reactive to light. Cardiovascular:      Rate and Rhythm: Normal rate and regular rhythm. Pulmonary:      Effort: Pulmonary effort is normal. No respiratory distress. Breath sounds: No wheezing or rales. Abdominal:      General: Bowel sounds are normal. There is no distension. Palpations: Abdomen is soft. Tenderness: There is no abdominal tenderness. Musculoskeletal:         General: No tenderness. Cervical back: Normal range of motion and neck supple. Skin:     General: Skin is warm and dry. Neurological:      Mental Status: She is alert and oriented to person, place, and time. Psychiatric:         Behavior: Behavior normal.          MDM  Number of Diagnoses or Management Options  Chest pain, unspecified type: new and requires workup  Diagnosis management comments: 12:30 AM discussed results with patient, unremarkable work-up. There are no red flags in her history, very low risk story. Her heart score is 2. Email was sent to the [facility] to schedule her for close outpatient follow-up.   She was told to take a baby aspirin daily until she sees a cardiologist.       Amount and/or Complexity of Data Reviewed  Clinical lab tests: ordered and reviewed  Tests in the radiology section of CPT®: ordered and reviewed    Risk of Complications, Morbidity, and/or Mortality  Presenting problems: moderate  Diagnostic procedures: moderate  Management options: moderate    Patient Progress  Patient progress: stable         EKG    Date/Time: 8/15/2021 12:30 AM  Performed by: Micah Reynolds MD  Authorized by: Micah Reynolds MD     ECG reviewed by ED Physician in the absence of a cardiologist: yes    Previous ECG: Previous ECG:  Unavailable  Interpretation:     Interpretation: normal    Rate:     ECG rate:  64    ECG rate assessment: normal    Rhythm:     Rhythm: sinus rhythm    Ectopy:     Ectopy: none    QRS:     QRS axis:  Normal    QRS intervals:  Normal  Conduction:     Conduction: normal    ST segments:     ST segments:  Normal  T waves:     T waves: normal

## 2021-08-15 NOTE — ED TRIAGE NOTES
Pt reports midsternal chest pain, states she feels like shes \"been kicked\" since Thursday. Denies injury to her chest states that she was seen at [facility] diagnosed with GERD. Started on a steroid and Protonix without relief.

## 2021-08-15 NOTE — DISCHARGE INSTRUCTIONS
Follow-up with [facility]. They should call you Monday morning to set up an appointment. Call them Monday afternoon if you have not yet heard back. Return the emergency department if your symptoms worsen.   Take an aspirin 81 mg daily until you see the cardiologist.

## 2021-08-15 NOTE — ED NOTES
I have reviewed discharge instructions with the patient. The patient verbalized understanding. Patient left ED via Discharge Method: ambulatory to Home with self. Opportunity for questions and clarification provided. Patient given 0 scripts. To continue your aftercare when you leave the hospital, you may receive an automated call from our care team to check in on how you are doing. This is a free service and part of our promise to provide the best care and service to meet your aftercare needs.  If you have questions, or wish to unsubscribe from this service please call [phone]. Thank you for Choosing our [address] [place] [address] Emergency Department.

## 2021-08-16 ENCOUNTER — APPOINTMENT (OUTPATIENT)
Dept: PHYSICAL THERAPY | Age: 51
End: 2021-08-16
Payer: COMMERCIAL

## 2021-08-18 ENCOUNTER — HOSPITAL ENCOUNTER (OUTPATIENT)
Dept: PHYSICAL THERAPY | Age: 51
Discharge: HOME | End: 2021-08-18
Payer: COMMERCIAL

## 2021-08-18 PROCEDURE — 97110 THERAPEUTIC EXERCISES: CPT

## 2021-08-18 PROCEDURE — 97140 MANUAL THERAPY 1/> REGIONS: CPT

## 2021-08-18 NOTE — THERAPY RECERTIFICATION
Franci Genir  : 1970  Primary: [facility]  Secondary:  [address] at [facility]  [address], [address]  [phone]   [phone]          OUTPATIENT PHYSICAL THERAPY:Recertification    ICD-10: Treatment Diagnosis: M53.3, Z47.89, M67.951  Precautions/Allergies:   Latex, natural rubber   TREATMENT PLAN:  Effective Dates: 2021 TO 2021 (90 days). Frequency/Duration: 2 times a week for 90 Day(s) MEDICAL/REFERRING DIAGNOSIS:  Sacrococcygeal disorders, not elsewhere classified [M53.3]  Encounter for other orthopedic aftercare [Z47.89]  Unspecified disorder of synovium and tendon, right thigh [M67.951]   DATE OF ONSET: 10/23/2020 surgery  REFERRING PHYSICIAN: Priya Tamayo MD MD Orders: Desiree Linton and treat   Return MD Appointment:      INITIAL ASSESSMENT:  Ms. Lena Sanchez presents to physical therapy with MD diagnosis of a SI joint pain, s/p right gluteal repair. Pt demonstrated signs and symptoms consistent with this diagnosis. On objective examination, the patient demonstrated deficits in ROM, strength, joint mobility, soft tissue mobility, functional ability. The patient also had increased pain. The patient is limited in the following activities: walking, standing, ADLs, functional tasks, work. The patient has a good  prognosis for recovery based on the examination findings and may be limited by: N/A. Patient requires skilled physical therapy services in order to return to prior level of function. REASSESSMENT: Ms. Lena Sanchez presented to physical therapy with MD diagnosis of a SI joint pain. Pt demonstrated signs and symptoms consistent with this diagnosis. On objective examination, the patient demonstrated improvements in ROM, strength, joint mobility, soft tissue mobility, functional ability, ambulatory ability.  The patient also has decreased pain These improvements have increased the patient's ability to: walk, stand, ADLs, functional tasks. The patient is still limited in the following activities: lifting, walking > 20min, standing, work, functional activities. The patient has a good prognosis for recovery based on the examination findings and may be limited by: N/A. Patient continues to require skilled physical therapy services in order to return to prior level of function. REASSESSMENT 8/18:  Ms. Hortensia Campbell presented to physical therapy with MD diagnosis of a SI joint pain. Pt demonstrated signs and symptoms consistent with this diagnosis. On objective examination, the patient demonstrated improvements in ROM, strength, joint mobility, soft tissue mobility, functional ability, ambulatory ability. The patient also has decreased pain These improvements have increased the patient's ability to: walk stand, exercise, ADLs, functional tasks. The patient is still limited in the following activities: lifting, walking > 60min, work, functional activities. The patient has a good prognosis for recovery based on the examination findings and may be limited by: N/A. Patient continues to require skilled physical therapy services in order to return to prior level of function. PROBLEM LIST (Impacting functional limitations):  1. Decreased Strength  2. Decreased Ambulation Ability/Technique  3. Increased Pain  4. Decreased Activity Tolerance  5. Decreased Flexibility/Joint Mobility INTERVENTIONS PLANNED: (Treatment may consist of any combination of the following)  1. Cold  2. Heat  3. Home Exercise Program (HEP)  4. Manual Therapy  5. Neuromuscular Re-education/Strengthening  6. Range of Motion (ROM)  7. Therapeutic Activites  8. Therapeutic Exercise/Strengthening     GOALS: (Goals have been discussed and agreed upon with patient.)  Short-Term Functional Goals: Time Frame: 5 weeks  1. Pt will be compliant with progressive HEP-- goal met  2. Pt will be able to do 6 sit to stands in 30sec with hands-- goal met  3.  Pt will improve LEFS score by 6pts-- goal met  Discharge Goals: Time Frame: 10 weeks  1. Pt will improve LEFS score by 12pts  2. Pt will have Hip ABD strength of 4/5 with pain < 3/10  3. Pt will decrease TUG time to < 12sec    OUTCOME MEASURE:   Tool Used: Lower Extremity Functional Scale (LEFS)  Score:  Initial: 11/80 Most Recent: 64/80 (Date: 8/18/2021 )   Interpretation of Score: 20 questions each scored on a 5 point scale with 0 representing \"extreme difficulty or unable to perform\" and 4 representing \"no difficulty\". The lower the score, the greater the functional disability. 80/80 represents no disability. Minimal detectable change is 9 points. MEDICAL NECESSITY:   · Patient is expected to demonstrate progress in strength, range of motion, coordination and functional technique to increase independence with ADLs and functional tasks. · Skilled intervention continues to be required due to return to prior level of function. REASON FOR SERVICES/OTHER COMMENTS:  · Patient continues to require skilled physical therapy in order to return to prior level of function. Total Duration:  PT Patient Time In/Time Out  Time In: 5955    Rehabilitation Potential For Stated Goals: Good  Regarding Isabela Quevedo's therapy, I certify that the treatment plan above will be carried out by a therapist or under their direction. Thank you for this referral,  Rom Ramos PT, DPT, OCS  Referring Physician Signature: Melissa Bal MD _______________________________ Date _____________     PAIN/SUBJECTIVE:   Initial:   7/10 Post Session:  5/10   HISTORY:   History of Injury/Illness (Reason for Referral):  Pt is s/p R hip labral repair, glute repair on 10/23/2020. Since then the patient has had a lot of difficulty with pain, weakness. Pt was injured at work while pushing a rack. Pt has been going to therapy since Oct/Nov but is still having a lot of pain and weakness. Pain is primarily in the lateral hip, is constant, sore, throbbing, aching.  Heat/ice is the only thing that helps  Past Medical History/Comorbidities:   Ms. Tom Lane  has a past medical history of Chlamydia, COVID-19 vaccine series completed (04/20/2021), Depression (08/16/2011), Endometriosis, Gonorrhea, Hypertension, Migraines, and PID (acute pelvic inflammatory disease). Ms. Tom Lane  has a past surgical history that includes hx pelvic laparoscopy (1993); hx other surgical (2020); hx heart catheterization; and colonoscopy (N/A, 6/21/2021). Social History/Living Environment:     Pt lives with family, 2 steps in house  Prior Level of Function/Work/Activity:  Works for [employer], light exercise  Personal Factors:          Sex:  female        Age:  48 y.o. Ambulatory/Rehab Services H2 Model Falls Risk Assessment   Risk Factors:       No Risk Factors Identified Ability to Rise from Chair:       (1)  Pushes up, successful in one attempt   Falls Prevention Plan:       No modifications necessary   Total: (5 or greater = High Risk): 1   ©2010 [facility] [email or web address]. All Rights Reserved. [facility] Patent #[id number]. Federal Law prohibits the replication, distribution or use without written permission from [facility] [employer]   Current Medications:       Current Outpatient Medications:     pantoprazole (Protonix) 40 mg tablet, Take 1 Tablet by mouth daily for 20 days. , Disp: 20 Tablet, Rfl: 0    predniSONE (STERAPRED DS) 10 mg dose pack, Please take as directed on package. Please clarify any questions with the Pharmacist., Disp: 21 Tablet, Rfl: 0    tiZANidine (ZANAFLEX) 2 mg tablet, TAKE 1 TABLET BY MOUTH 2 TIMES A DAY AS NEEDED FOR MUSCLE SPAMS (Patient not taking: Reported on 6/21/2021), Disp: 90 Tablet, Rfl: 0    estradioL (ESTRACE) 0.01 % (0.1 mg/gram) vaginal cream, Apply externally and intravaginal qhs x 2weeks then on Sunday and Wednesday. , Disp: 42.5 g, Rfl: 0    progesterone (PROMETRIUM) 200 mg capsule, Take QHS the 1-12 of every month.  (Patient not taking: Reported on 6/21/2021), Disp: 12 Cap, Rfl: 5    estradioL (VIVELLE) 0.05 mg/24 hr, Change patch on sundays and Wednesday. (Patient not taking: Reported on 6/21/2021), Disp: 24 Patch, Rfl: 3    amLODIPine (NORVASC) 5 mg tablet, TAKE 1 TABLET BY MOUTH EVERY DAY, Disp: 90 Tab, Rfl: 1    SUMAtriptan (IMITREX) 100 mg tablet, TAKE 1 TABLET BY MOUTH ONCE AS NEEDED FOR MIGRAINE FOR UP TO 1 DOSE (Patient not taking: Reported on 6/21/2021), Disp: , Rfl:     venlafaxine-SR (EFFEXOR-XR) 37.5 mg capsule, Take 1 Cap by mouth daily. (Patient not taking: Reported on 6/21/2021), Disp: 90 Cap, Rfl: 1    ibuprofen (MOTRIN) 800 mg tablet, Take 800 mg by mouth every eight (8) hours as needed. (Patient not taking: Reported on 6/21/2021), Disp: , Rfl:     DISABLED PLACARD (DISABLED PLACARD) DMV, 1 Each., Disp: , Rfl:     butalbital-acetaminophen-caff (Fioricet) -40 mg per capsule, Take 1 Cap by mouth every four (4) hours as needed for Migraine. , Disp: 8 Cap, Rfl: 3    lidocaine (Lidoderm) 5 %, Apply patch to the affected area for 12 hours a day and remove for 12 hours a day. (Patient not taking: Reported on 6/21/2021), Disp: 10 Each, Rfl: 0    LORazepam (ATIVAN) 1 mg tablet, Take 1 Tab by mouth nightly as needed for Anxiety. Max Daily Amount: 1 mg. (Patient not taking: Reported on 6/21/2021), Disp: 30 Tab, Rfl: 2   Date Last Reviewed:  08/18/21     Number of Personal Factors/Comorbidities that affect the Plan of Care: 1-2: MODERATE COMPLEXITY   EXAMINATION:   *= Painful     WNL= within normal limits     NT= not tested    Observation  Gait: Pt able to rise from chair without hands quickly.  Walks with less limping and improved speed      ROM   Right Left   Flexion 100 115   ER 45 45   IR 30 30     Strength (all MMT scores are graded on a scale of 0-5)   Right Left   Hip      Flexion 5 5   Abduction 4* 5   Extension 5 5   Glute Max 5 5   ER 5 5   IR 5* 5   Knee     Extension 5 5   Flexion 5 5       Joint/Soft Tissue Mobility   Description   Joint Mobility  Hip: improved mobility, no pain and improved   Lumbar: decreased pain   Soft Tissue Mobility TTP around lateral hip, improved     Functional Mobility Screen   30sec Sit to Stand: 17x (no hands)           Body Structures Involved:  1. Bones  2. Joints  3. Muscles  4. Ligaments Body Functions Affected:  1. Sensory/Pain  2. Neuromusculoskeletal  3. Movement Related Activities and Participation Affected:  1. General Tasks and Demands  2. Mobility  3. Self Care  4. Domestic Life  5. Interpersonal Interactions and Relationships  6.  Community, Social and [place]   Number of elements (examined above) that affect the Plan of Care: 3: MODERATE COMPLEXITY   CLINICAL PRESENTATION:   Presentation: Stable and uncomplicated: LOW COMPLEXITY   CLINICAL DECISION MAKING:   Use of outcome tool(s) and clinical judgement create a POC that gives a: Clear prediction of patient's progress: LOW COMPLEXITY     Gable Mohs PT, DPT, OCS

## 2021-08-18 NOTE — PROGRESS NOTES
TREATMENT:    PT Patient Time In/Time Out  Time In: 6506  Time Out: 0950      Total Time: 55 min  Visit Count:  26     ICD-10: Treatment Diagnosis: M53.3, Z47.89, M67.951  Medication Last Reviewed: 08/18/21      TREATMENT PLAN  Effective Dates: 5/28/2021 TO 8/26/2021 (90 days).  Frequency/Duration: 2 times a week for 90 Day(s)         Subjective: See Recertification Note dated 8/18/2021 for details   Pain: 3/10    Objective: See Recertification Note dated 8/18/2021 for details      Therapeutic Exercise:  (40min) Done in order to improve strength, ROM and understanding of current condition. Date  8/4 Date  8/11/21 Date  8/13/21 Date:  8/18   Activity/Exercise       Education    Reassessment   NuStep x10min lvl 5   x10min lvl 4.5   Treadmill x5min @ 1.5mph x5min @ 1.5mph X 10 min @ 2.0mph x10min @ 2.0mph   SLR 3 x 10 3 x 10     Hip abd    3 x10  2x15 R S/L   Knee to chest       Trunk rotation       clams 3 x 10 3 x10     Sit to stand 3 x 10 3 x 10     Hip flexor stretching       Bridges 2 x 10      Prone Hip Extension 3 x 10 3 x 10            Mini Squats  2 x 10      Hip flexor stretching 3 x 1 min 3 x 1 min     Quad stretching ( prone)  4 x 1 min 4 x 1 min     3 way hip machine  Abd/flex # 15 3 x10  Flex: 3x10 B 30lbs  ABD: 3x10 B 25lbs                                 Aquatic Therapy: (0min) Done in order to improve strength, ROM and understanding of current condition.     Date  8/2 Date  8/5 Date  8/9/21 Date  8/13/21   Activity/Exercise       SLR 3 x 10 3 x 10 3 x 10 3 x 10   Hip Extension 2x15 B 2 x 15 B 2 x 15 B 2 x 15 B   Step Ups x30 (middle steps) 2 x 30 3 x 30  3 x 30   Hip ABD 2x15 B 2 x 15 B 2 x 15 B 2 x 15 B   Sidestepping   3 x 10    Walking       Marching       Leg Swings Flex --> Ext  3 x 15 3 x 15    Circles        SL Balance       bicycle  X 10 min X 10 min  x 10 min   Hip flex /knee ext 2 x 10 B 3 x 10 B 3 x 10 B 3 x 10 B   squats 2 x 10 3 x 10 3 x 10 3 x 10   Quad stretching       Lateral Step Downs  3 x 15 B 3 x 15 B 3 x 5 B   Hip Flexion       Jogging    4 min   Hip flexor stretching 4 x 1 min 4 x 1 min 4 x 1 min 4 x 1 min   Modified quad stretching 3 x 1 min 4 x 1 min 4 x 1 min 4 x 1 min   Lateral Lunges    2 x 10       Manual Therapy: (15min) Done in order to improve joint and soft tissue mobility,reduce muscle guarding, and decrease muscle tone   Date:  7/8 Date:  8/18 Date:   Date:     Type Parameters      Joint Mobilization       Soft Tissue Mobilization To quad and IT band Lateral hip         Modalities: (-) Done in order to reduce swelling and pain    Assessment: See Recertification Note dated 8/18/2021 for details    Plan: continue with aquatics    Future Appointments   Date Time Provider Felisha Jewell   8/20/2021  8:45 AM [place] Mom, PT [facility]   8/23/2021  8:45 AM Ebb Sears [facility]   8/25/2021  8:45 AM [place] Mom, PT [facility]   8/27/2021  8:45 AM [place] Mom, PT [facility]   8/30/2021  8:45 AM Ebb Sears [facility]   9/1/2021  8:45 AM [place] Mom, PT [facility]   9/1/2021  2:30 PM [address] [place] [address] Road [address] [facility]   9/1/2021  3:20 PM Claudetta Dutton, MD [facility]   9/3/2021  8:45 AM [place] Mom, PT [facility]       Anthony Kyle Time:   Units:   3TE/ 1MT      Donavon Stearns PT,     Visit Approval Visit # Therapist initials Date A NS / Cx < 24 hr >24 hr Cx Comments    1 WJ 5/28 [x]  [] [] Initial evaluation    2 W 6/1 [x] [] []     3 W 6/3 [x] [] []     [address] 6/8 [x] [] []     [address] 6/11 [x] [] []     [address] 6/15 [x] [] []     [address] 6-18 [x] [] []     [address] 6-22 [x] [] []     [address] 6-24 [x] [] []     [address] 6-29 [x] [] [] Progress Note    [address] 7-1 [x] [] []     [address] 7/8 [x] [] []     13 J 7/13 [x] [] []     14  7/19 [x] [] []     15  7/21 [x] [] []     16  7/22 [x] [] []     [address] 7/26 [x] [] []     [address] 7/28 [x] [] []     19  7/29 [x]       [address] 8/2 [x]       [address] 8/4 [x]       [address] 8/5 [x]       23 [facility] 8/9 [x]       24 [facility] 8/11 [x]       25 [facility] 8/13 [x]       26 WJ 9/09 [x]   Recert note       []          []          []          []          []          []          []          []          []          []          []          []

## 2021-08-19 PROBLEM — E78.49 FAMILIAL HYPERLIPIDEMIA: Status: ACTIVE | Noted: 2020-09-02

## 2021-08-20 ENCOUNTER — HOSPITAL ENCOUNTER (OUTPATIENT)
Dept: PHYSICAL THERAPY | Age: 51
Discharge: HOME | End: 2021-08-20
Payer: COMMERCIAL

## 2021-08-20 PROCEDURE — 97110 THERAPEUTIC EXERCISES: CPT

## 2021-08-20 PROCEDURE — 97113 AQUATIC THERAPY/EXERCISES: CPT

## 2021-08-20 NOTE — PROGRESS NOTES
TREATMENT:    PT Patient Time In/Time Out  Time In: 0845  Time Out: 1264      Total Time: 55 min  Visit Count:  27     ICD-10: Treatment Diagnosis: M53.3, Z47.89, M67.951  Medication Last Reviewed: 08/20/21      TREATMENT PLAN  Effective Dates: 5/28/2021 TO 8/26/2021 (90 days).  Frequency/Duration: 2 times a week for 90 Day(s)         Subjective: Pt states she is feeling good today   Pain: 2/10    Objective: Therapeutic Exercise:  (20min) Done in order to improve strength, ROM and understanding of current condition. Date  8/11/21 Date  8/13/21 Date:  8/18 Date:  8/20   Activity/Exercise       Education   Reassessment    NuStep   x10min lvl 4.5    Treadmill x5min @ 1.5mph X 10 min @ 2.0mph x10min @ 2.0mph x10min @ 2.0mph   SLR 3 x 10      Hip abd   3 x10  2x15 R S/L    Knee to chest       Trunk rotation       clams 3 x10      Sit to stand 3 x 10      Hip flexor stretching       Bridges       Prone Hip Extension 3 x 10             Mini Squats        Hip flexor stretching 3 x 1 min      Quad stretching ( prone)  4 x 1 min      3 way hip machine Abd/flex # 15 3 x10  Flex: 3x10 B 30lbs  ABD: 3x10 B 25lbs ABD: 3x10 B 25lbs  Ext: 3x10 B 40lbs                                 Aquatic Therapy: (40min) Done in order to improve strength, ROM and understanding of current condition.     Date  8/5 Date  8/9/21 Date  8/13/21 Date:  8/20   Activity/Exercise       SLR 3 x 10 3 x 10 3 x 10    Hip Extension 2 x 15 B 2 x 15 B 2 x 15 B 2x15 B   Step Ups 2 x 30 3 x 30  3 x 30 x15 (full staircase)   Hip ABD 2 x 15 B 2 x 15 B 2 x 15 B 2x15 B   Sidestepping  3 x 10     Walking       Marching       Leg Swings Flex --> Ext 3 x 15 3 x 15     Circles        SL Balance    4x   bicycle X 10 min X 10 min  x 10 min    Hip flex /knee ext 3 x 10 B 3 x 10 B 3 x 10 B    squats 3 x 10 3 x 10 3 x 10 3x10   Quad stretching       Lateral Step Downs 3 x 15 B 3 x 15 B 3 x 5 B    Hip Flexion       Jogging   4 min 4x1min   Hip flexor stretching 4 x 1 min 4 x 1 min 4 x 1 min    Modified quad stretching 4 x 1 min 4 x 1 min 4 x 1 min    Lateral Lunges   2 x 10 2x10 B       Manual Therapy: (0min) Done in order to improve joint and soft tissue mobility,reduce muscle guarding, and decrease muscle tone   Date:  7/8 Date:  8/18 Date:   Date:     Type Parameters      Joint Mobilization       Soft Tissue Mobilization To quad and IT band Lateral hip         Modalities: (-) Done in order to reduce swelling and pain    Assessment: Pt able to increase load on hip exercises and had no irritation with progressions    Plan: continue with aquatics, progressing to more land    Future Appointments   Date Time Provider Felisha Jewell   8/23/2021  8:45 AM [place] Sides [facility]   8/25/2021  8:45 AM Elmarie Cleveland, PT [facility]   8/27/2021  8:45 AM Elmarie Cleveland, PT [facility]   8/30/2021  8:45 AM [place] Sides [facility]   9/1/2021  8:45 AM Elmarie Cleveland, PT [facility]   9/1/2021  2:30 PM [address] [place] [address] [address] [facility]   9/1/2021  3:20 PM Jaime Coe MD [facility]   9/3/2021  8:45 AM [place] [facility]   9/21/2021  8:45 AM NUCLEAR STRESS [facility]   9/27/2021  7:30 AM ECHO 63 [facility]   11/22/2021 10:00 [facility]       Unbilled Time:   Units:   1TE/ 5LY      David Zhao PT,     Visit Approval Visit # Therapist initials Date A NS / Cx < 24 hr >24 hr Cx Comments    1 WJ 5/28 [x]  [] [] Initial evaluation    2 W 6/1 [x] [] []     3 W 6/3 [x] [] []     [address] 6/8 [x] [] []     [address] 6/11 [x] [] []     [address] 6/15 [x] [] []     [address] 6-18 [x] [] []     [address] 6-22 [x] [] []     [address] 6-24 [x] [] []     [address] 6-29 [x] [] [] Progress Note    [address] 7-1 [x] [] []     [address] 7/8 [x] [] []     13  7/13 [x] [] []     14  7/19 [x] [] []     15  7/21 [x] [] []     16  7/22 [x] [] []     [address] 7/26 [x] [] []     [address] 7/28 [x] [] []     19 J 7/29 [x]       [address] 8/2 [x]       21 [facility] 8/4 [x]       [address] 8/5 [x]       [address] 8/9 [x]       [address] 8/11 [x]       25 [address] 8/13 [x]       26 WJ 3/97 [x]   Recert note    32 WJ 0/84 [x]          []          []          []          []          []          []          []          []          []          []          []

## 2021-08-23 ENCOUNTER — HOSPITAL ENCOUNTER (OUTPATIENT)
Dept: PHYSICAL THERAPY | Age: 51
Discharge: HOME | End: 2021-08-23
Payer: COMMERCIAL

## 2021-08-23 PROCEDURE — 97113 AQUATIC THERAPY/EXERCISES: CPT

## 2021-08-23 PROCEDURE — 97110 THERAPEUTIC EXERCISES: CPT

## 2021-08-23 NOTE — PROGRESS NOTES
TREATMENT:    PT Patient Time In/Time Out  Time In: 0845  Time Out: 2232      Total Time: 60 min  Visit Count:  28     ICD-10: Treatment Diagnosis: M53.3, Z47.89, M67.951  Medication Last Reviewed: 08/23/21      TREATMENT PLAN  Effective Dates: 5/28/2021 TO 8/26/2021 (90 days).  Frequency/Duration: 2 times a week for 90 Day(s)         Subjective: Patient reports leg is feeling better today.  Pain: 2/10    Objective: Therapeutic Exercise:  (15min) Done in order to improve strength, ROM and understanding of current condition. Date:  8/18 Date:  8/20 Date  8/23   Activity/Exercise      Education Reassessment     NuStep x10min lvl 4.5     Treadmill x10min @ 2.0mph x10min @ 2.0mph X 15 min @2.2   SLR      Hip abd   2x15 R S/L     Knee to chest      Trunk rotation      clams      Sit to stand      Hip flexor stretching      Bridges      Prone Hip Extension            Mini Squats       Hip flexor stretching      Quad stretching ( prone)       3 way hip machine Flex: 3x10 B 30lbs  ABD: 3x10 B 25lbs ABD: 3x10 B 25lbs  Ext: 3x10 B 40lbs                              Aquatic Therapy: (45min) Done in order to improve strength, ROM and understanding of current condition.     Date  8/13/21 Date:  8/20 Date  8/23   Activity/Exercise      SLR 3 x 10  3 x 10   Hip Extension 2 x 15 B 2x15 B 2x15 B   Step Ups 3 x 30 x15 (full staircase)    Hip ABD 2 x 15 B 2x15 B 3 x 15B   Sidestepping      Walking      Marching      Leg Swings Flex --> Ext      Circles       SL Balance  4x    bicycle  x 10 min  X 10 min   Hip flex /knee ext 3 x 10 B     squats 3 x 10 3x10 3 x 10   Quad stretching      Lateral Step Downs 3 x 5 B  2 x 10   Hip Flexion      Jogging 4 min 4x1min    Hip flexor stretching 4 x 1 min  4 x 1 min   Modified quad stretching 4 x 1 min  4 x 1 min   Lateral Lunges 2 x 10 2x10 B 3 x 10       Manual Therapy: (0min) Done in order to improve joint and soft tissue mobility,reduce muscle guarding, and decrease muscle tone Date:  7/8 Date:  8/18 Date:   Date:     Type Parameters      Joint Mobilization       Soft Tissue Mobilization To quad and IT band Lateral hip         Modalities: (-) Done in order to reduce swelling and pain    Assessment: Patient demonstrated good effort with all exercises today. Better ambulation. Patient seems pleased with progress.     Plan: continue with aquatics, progressing to more land    Future Appointments   Date Time Provider Felisha Fanta   8/25/2021  8:45 AM Reta Cradle, PT [facility]   8/27/2021  8:45 AM Glasgow Cradle, PT [facility]   8/30/2021  8:45 AM Masood Patee [facility]   9/1/2021  8:45 AM Glasgow Cradle, PT [facility]   9/1/2021  2:30 PM [address] MOBILE US [facility] [address]   9/1/2021  3:20 PM Lima Coleman MD [facility] [address]   9/3/2021  8:45 [facility]   9/21/2021  8:45 AM NUCLEAR STRESS GVL [facility]   9/27/2021  7:30 AM ECHO 63 [facility]VL [facility]   11/22/2021 10:00 [facility]       Unbilled Time:   Units:   1TE/ [address], [facility]    Visit Approval Visit # Therapist initials Date A NS / Cx < 24 hr >24 hr Cx Comments    1  5/28 [x]  [] [] Initial evaluation    2  6/1 [x] [] []     3  6/3 [x] [] []     4 [address] 6/8 [x] [] []     5 [address] 6/11 [x] [] []     6 [address] 6/15 [x] [] []     7 [address] 6-18 [x] [] []     8 [address] 6-22 [x] [] []     [address] 6-24 [x] [] []     [address] 6-29 [x] [] [] Progress Note    [address] 7-1 [x] [] []     [address] 7/8 [x] [] []     13  7/13 [x] [] []     14 Omi Gray 7/19 [x] [] []     15 W 7/21 [x] [] []     16  7/22 [x] [] []     17 [address] 7/26 [x] [] []     18 [address] 7/28 [x] [] []     19 W 7/29 [x]       20 [address] 8/2 [x]       21 [address] 8/4 [x]       22 [address] 8/5 [x]       23 [address] 8/9 [x]       [address] 8/11 [x]       25 [address] 8/13 [x]       26 W 7/27 [x]   Recert note    27 W 5/54 [x]       [address] 8/23 [x]          []          []          []          []          []          []          [] []          []          []

## 2021-08-25 ENCOUNTER — HOSPITAL ENCOUNTER (OUTPATIENT)
Dept: PHYSICAL THERAPY | Age: 51
Discharge: HOME | End: 2021-08-25
Payer: COMMERCIAL

## 2021-08-25 PROCEDURE — 97110 THERAPEUTIC EXERCISES: CPT

## 2021-08-25 NOTE — PROGRESS NOTES
Jeffery Salgado  : 1970  Primary: Rudolph Alberto  Secondary:  [address] at [facility]  [address], [address]  [phone]   [phone]        TREATMENT:    PT Patient Time In/Time Out  Time In: 0845  Time Out: 3886      Total Time: 55 min  Visit Count:  29     ICD-10: Treatment Diagnosis: M53.3, Z47.89, M67.951  Medication Last Reviewed: 21      TREATMENT PLAN  Effective Dates: 2021 TO 2021 (90 days). Frequency/Duration: 2 times a week for 90 Day(s)       Subjective: Patient states her hip is feeling good.  Pain: 2/10    Objective: Therapeutic Exercise:  (55min) Done in order to improve strength, ROM and understanding of current condition. Date:   Date:   Date   Date:     Activity/Exercise       Education Reassessment      NuStep x10min lvl 4.5   x10min (adding 1.0 every 2 minutes 1.0-6.0)   Treadmill x10min @ 2.0mph x10min @ 2.0mph X 15 min @2.2 x10min @ 2. 3mph   SLR       Hip abd   2x15 R S/L      Knee to chest       Trunk rotation       clams       Sit to stand       Hip flexor stretching       Bridges       Step Ups    x20 8\"   Sidestepping    3x40ft red TB   Mini Squats     3x10 (red TB ABD hold)   Hip flexor stretching       Quad stretching ( prone)        3 way hip machine Flex: 3x10 B 30lbs  ABD: 3x10 B 25lbs ABD: 3x10 B 25lbs  Ext: 3x10 B 40lbs  ABD: 3x10 B 25lbs  Ext: 3x10 B 50lbs   Bird Dogs    3x10 B (legs only)   Monster Walks    3x40ft red TB                   Aquatic Therapy: (0min) Done in order to improve strength, ROM and understanding of current condition.     Date  21 Date:   Date     Activity/Exercise      SLR 3 x 10  3 x 10   Hip Extension 2 x 15 B 2x15 B 2x15 B   Step Ups 3 x 30 x15 (full staircase)    Hip ABD 2 x 15 B 2x15 B 3 x 15B   Sidestepping      Walking      Marching      Leg Swings Flex --> Ext      Circles       SL Balance  4x    bicycle  x 10 min  X 10 min Hip flex /knee ext 3 x 10 B     squats 3 x 10 3x10 3 x 10   Quad stretching      Lateral Step Downs 3 x 5 B  2 x 10   Hip Flexion      Jogging 4 min 4x1min    Hip flexor stretching 4 x 1 min  4 x 1 min   Modified quad stretching 4 x 1 min  4 x 1 min   Lateral Lunges 2 x 10 2x10 B 3 x 10       Manual Therapy: (0min) Done in order to improve joint and soft tissue mobility,reduce muscle guarding, and decrease muscle tone   Date:  7/8 Date:  8/18 Date:   Date:     Type Parameters      Joint Mobilization       Soft Tissue Mobilization To quad and IT band Lateral hip         Modalities: (-) Done in order to reduce swelling and pain    Assessment: Patient able to progress land based exercises without pain    Plan: continue with aquatics, progressing to more land    Future Appointments   Date Time Provider Felisha Jewell   8/27/2021  8:45 AM Katelynn Frank PT [facility]   8/30/2021  8:45 AM Marina Foreman [facility]   9/1/2021  8:45 AM Marina Foreman [facility]   9/1/2021  2:30 PM [address] [place] [address] 601 [facility]   9/1/2021  3:20 PM Linward Bumpers, MD [facility]   9/3/2021  8:45 AM Marina Froeman [facility]   9/21/2021  8:45 AM NUCLEAR STRESS GVL [facility]   9/27/2021  7:30 AM ECHO 63 L [facility]   11/22/2021 10:00 AM sol Victoria MD [facility]       Unbilled Time:   Units:   4TE      Harper Reilly, PT, DPT, OCS    Visit Approval Visit # Therapist initials Date A NS / Cx < 24 hr >24 hr Cx Comments    1 WJ 5/28 [x]  [] [] Initial evaluation    2 W 6/1 [x] [] []     3 W 6/3 [x] [] []     4 [address] 6/8 [x] [] []     5 [address] 6/11 [x] [] []     6 [address] 6/15 [x] [] []     7 [address] 6-18 [x] [] []     8 [address] 6-22 [x] [] []     [address] 6-24 [x] [] []     [address] 6-29 [x] [] [] Progress Note    [address] 7-1 [x] [] []     [address] 7/8 [x] [] []     13  7/13 [x] [] []     14  7/19 [x] [] []     15  7/21 [x] [] []     16  7/22 [x] [] []     17 [address] 7/26 [x] [] []     18 [address] 7/28 [x] [] [] [address] 7/29 [x]       20 KH 8/2 [x]       21 [address] 8/4 [x]       22 [address] 8/5 [x]       23 [address] 8/9 [x]       24 [address] 8/11 [x]       25 [address] 8/13 [x]       26 W 5/26 [x]   Recert note    27 WJ 4/77 [x]       28 [address] 8/23 [x]       29  8/25 [x]          []          []          []          []          []          []          []          []          []

## 2021-08-27 ENCOUNTER — HOSPITAL ENCOUNTER (OUTPATIENT)
Dept: PHYSICAL THERAPY | Age: 51
Discharge: HOME | End: 2021-08-27
Payer: COMMERCIAL

## 2021-08-27 PROCEDURE — 97113 AQUATIC THERAPY/EXERCISES: CPT

## 2021-08-27 PROCEDURE — 97110 THERAPEUTIC EXERCISES: CPT

## 2021-08-27 NOTE — PROGRESS NOTES
Franci Diana  : 1970  Primary: [facility]  Secondary:  [address] at [facility]  [address], [address]  [phone]   [phone]        TREATMENT:    PT Patient Time In/Time Out  Time In: 0845  Time Out: 4604      Total Time: 62 min  Visit Count:  30     ICD-10: Treatment Diagnosis: M53.3, Z47.89, M67.951  Medication Last Reviewed: 21      TREATMENT PLAN  Effective Dates: 2021 TO 2021 (90 days). Frequency/Duration: 2 times a week for 90 Day(s)       Subjective: Patient states she is feeling good today, no pain   Pain: 0/10    Objective: Therapeutic Exercise:  (17min) Done in order to improve strength, ROM and understanding of current condition. Date:   Date   Date:   Date:     Activity/Exercise       Education       NuStep   x10min (adding 1.0 every 2 minutes 1.0-6.0)    Treadmill x10min @ 2.0mph X 15 min @2.2 x10min @ 2. 3mph x10min @ 2. 3mph   SLR       Hip abd         Knee to chest       Trunk rotation       clams       Sit to stand       Hip flexor stretching       Bridges       Step Ups   x20 8\"    Sidestepping   3x40ft red TB    Mini Squats    3x10 (red TB ABD hold)    Hip flexor stretching       Quad stretching ( prone)        3 way hip machine ABD: 3x10 B 25lbs  Ext: 3x10 B 40lbs  ABD: 3x10 B 25lbs  Ext: 3x10 B 50lbs ABD: 3x10 B 25lbs  Ext: 3x10 B 50lbs   Bird Dogs   3x10 B (legs only)    Monster Walks   3x40ft red TB                    Aquatic Therapy: (45min) Done in order to improve strength, ROM and understanding of current condition.     Date  21 Date:   Date   Date:     Activity/Exercise       SLR 3 x 10  3 x 10    Hip Extension 2 x 15 B 2x15 B 2x15 B 2x20 B   Step Ups 3 x 30 x15 (full staircase)  x20 (full staircase)   Hip ABD 2 x 15 B 2x15 B 3 x 15B 2x20 B   Sidestepping       Walking       Marching       Leg Swings Flex --> Ext       Circles        SL Balance  4x  4x bicycle  x 10 min  X 10 min    Hip flex /knee ext 3 x 10 B      squats 3 x 10 3x10 3 x 10 3x10 (middle step)   Quad stretching       Lateral Step Downs 3 x 5 B  2 x 10 2x10 (middle step)   Hip Flexion       Jogging 4 min 4x1min  4x1min   Hip flexor stretching 4 x 1 min  4 x 1 min    Modified quad stretching 4 x 1 min  4 x 1 min    Lateral Lunges 2 x 10 2x10 B 3 x 10 3x10       Manual Therapy: (0min) Done in order to improve joint and soft tissue mobility,reduce muscle guarding, and decrease muscle tone   Date:  7/8 Date:  8/18 Date:   Date:     Type Parameters      Joint Mobilization       Soft Tissue Mobilization To quad and IT band Lateral hip         Modalities: (-) Done in order to reduce swelling and pain    Assessment: Patient continues to progress well, able to increase difficulty and weights on exercises    Plan: continue with aquatics, progressing to more land    Future Appointments   Date Time Provider Felisha Jewell   8/30/2021  8:45 AM Tacey Duverney [facility]   9/1/2021  8:45 AM Tacey Duverney [facility]   9/1/2021  2:30 PM [address] [place] [address] [address] [place]   9/1/2021  3:20 PM Deepti Youssef MD [facility]   9/3/2021  8:45 AM Tacey Duverney [facility]   9/21/2021  8:45 AM NUCLEAR STRESS GVL [place]   9/27/2021  7:30 AM ECHO 63 L [facility]   11/22/2021 10:00 AM sol Tolbert MD [facility]       Unbilled Time:   Units:   1TE/ 0NA      Juanito Cradle, PT, DPT, OCS    Visit Approval Visit # Therapist initials Date A NS / Cx < 24 hr >24 hr Cx Comments    1 W 5/28 [x]  [] [] Initial evaluation    2 W 6/1 [x] [] []     3 W 6/3 [x] [] []     4 [address] 6/8 [x] [] []     5 [address] 6/11 [x] [] []     6 [address] 6/15 [x] [] []     7 [address] 6-18 [x] [] []     [address] 6-22 [x] [] []     [address] 6-24 [x] [] []     [address] 6-29 [x] [] [] Progress Note    [address] 7-1 [x] [] []     [address] 7/8 [x] [] []     13  7/13 [x] [] []     14  7/19 [x] [] []     15  7/21 [x] [] []     16  7/22 [x] [] []     17 [address] 7/26 [x] [] []     18 [address] 7/28 [x] [] []     19 WJ 7/29 [x]       [address] 8/2 [x]       21 [address] 8/4 [x]       22 [address] 8/5 [x]       23 [address] 8/9 [x]       [address] 8/11 [x]       25 [address] 8/13 [x]       26 WJ 6/50 [x]   Recert note    27 WJ 6/01 [x]       28 [address] 8/23 [x]       29 WJ 8/25 [x]       30 W 8/27 [x]          []          []          []          []          []          []          []          []

## 2021-08-30 ENCOUNTER — HOSPITAL ENCOUNTER (OUTPATIENT)
Dept: PHYSICAL THERAPY | Age: 51
Discharge: HOME | End: 2021-08-30
Payer: COMMERCIAL

## 2021-08-30 PROCEDURE — 97110 THERAPEUTIC EXERCISES: CPT

## 2021-08-30 NOTE — PROGRESS NOTES
Linda Score  : 1970  Primary: Jose Plunkettrisk  Secondary:  [address] at [facility]  [address], [address]  [phone]   [phone]        TREATMENT:    PT Patient Time In/Time Out  Time In: 845  Time Out: 945      Total Time: 60 min  Visit Count:  31     ICD-10: Treatment Diagnosis: M53.3, Z47.89, M67.951  Medication Last Reviewed: 21      TREATMENT PLAN  Effective Dates: 2021 TO 2021 (90 days). Frequency/Duration: 2 times a week for 90 Day(s)       Subjective: Patient reports leg was a bit sore after last treatment visit. She states it feels tight today.  Pain: 0/10    Objective: Therapeutic Exercise:  (60min) Done in order to improve strength, ROM and understanding of current condition. Date:   Date:   Date  21   Activity/Exercise      Education      NuStep x10min (adding 1.0 every 2 minutes 1.0-6.0)     Treadmill x10min @ 2. 3mph x10min @ 2. 3mph X 10 min 2.3 mph   SLR      Hip abd        Knee to chest      Trunk rotation      clams      Sit to stand      Hip flexor stretching      Bridges      Step Ups x20 8\"  X 20 8\"   Sidestepping 3x40ft red TB  X 50 ft red TB   Mini Squats  3x10 (red TB ABD hold)  3 x 10   Hip flexor stretching   4 x 1 min   Quad stretching ( prone)    4 x 1 min with stretch stap   3 way hip machine ABD: 3x10 B 25lbs  Ext: 3x10 B 50lbs ABD: 3x10 B 25lbs  Ext: 3x10 B 50lbs ABD: 3x10 B 25lbs  Ext: 3x10 B 50lbs   Bird Dogs 3x10 B (legs only)  3x10 B (legs only)   Monster Walks 3x40ft red TB     Hamstring stretching   4 x 1min           Aquatic Therapy: (min) Done in order to improve strength, ROM and understanding of current condition.     Date  21 Date:   Date   Date:     Activity/Exercise       SLR 3 x 10  3 x 10    Hip Extension 2 x 15 B 2x15 B 2x15 B 2x20 B   Step Ups 3 x 30 x15 (full staircase)  x20 (full staircase)   Hip ABD 2 x 15 B 2x15 B 3 x 15B 2x20 B Sidestepping       Walking       Marching       Leg Swings Flex --> Ext       Circles        SL Balance  4x  4x   bicycle  x 10 min  X 10 min    Hip flex /knee ext 3 x 10 B      squats 3 x 10 3x10 3 x 10 3x10 (middle step)   Quad stretching       Lateral Step Downs 3 x 5 B  2 x 10 2x10 (middle step)   Hip Flexion       Jogging 4 min 4x1min  4x1min   Hip flexor stretching 4 x 1 min  4 x 1 min    Modified quad stretching 4 x 1 min  4 x 1 min    Lateral Lunges 2 x 10 2x10 B 3 x 10 3x10       Manual Therapy: (0min) Done in order to improve joint and soft tissue mobility,reduce muscle guarding, and decrease muscle tone   Date:  7/8 Date:  8/18 Date:   Date:     Type Parameters      Joint Mobilization       Soft Tissue Mobilization To quad and IT band Lateral hip         Modalities: (-) Done in order to reduce swelling and pain    Assessment: Patient continues to progress well.  Better ambulation    Plan: continue with aquatics, progressing to more land    Future Appointments   Date Time Provider Felisha Jewell   9/1/2021  2:30 PM [facility]   9/1/2021  3:20 PM Edin Do MD [facility]   9/3/2021  8:45 AM Ledora Atkins [facility]   9/7/2021  8:45 AM Ledora Atkins [facility]   9/10/2021  8:45 AM Ledora Atkins [facility]   9/13/2021  8:45 AM Ledora Atkins [facility]   9/17/2021  8:45 AM Ledora Atkins [facility]   9/20/2021  8:45 AM Silvina Black PT [facility]   9/21/2021  8:45 AM NUCLEAR STRESS [facility]VL [facility]   9/24/2021  8:45 AM Silvina Black PT [facility]   9/27/2021  7:30 AM ECHO 63 [facility]   11/22/2021 10:00 [facility]       Unbilled Time:   Units:   4TE/ AQ      Efra Sheridan PTA    Visit Approval Visit # Therapist initials Date A NS / Cx < 24 hr >24 hr Cx Comments    1 WJ 5/28 [x]  [] [] Initial evaluation    2 WJ 6/1 [x] [] []     3 WJ 6/3 [x] [] []     4 [facility] 6/8 [x] [] []     [address] 6/11 [x] [] []     6 [address] 6/15 [x] [] []     7 [address] 6-18 [x] [] []     8 KH 6-22 [x] [] []     [address] 6-24 [x] [] []     [address] 6-29 [x] [] [] Progress Note    [address] 7-1 [x] [] []     [address] 7/8 [x] [] []     13 WJ 7/13 [x] [] []     14 WJ 7/19 [x] [] []     15 WJ 7/21 [x] [] []     16 WJ 7/22 [x] [] []     17 [address] 7/26 [x] [] []     18 [address] 7/28 [x] [] []     19 WJ 7/29 [x]       20 KH 8/2 [x]       21 [address] 8/4 [x]       [address] 8/5 [x]       23 [address] 8/9 [x]       [address] 8/11 [x]       25 [address] 8/13 [x]       26 WJ 1/49 [x]   Recert note    27 WJ 9/80 [x]       [address] 8/23 [x]       29 WJ 8/25 [x]       30 WJ 8/27 [x]       [address] 8/30 [x]          []          []          []          []          []          []          []

## 2021-09-01 ENCOUNTER — APPOINTMENT (OUTPATIENT)
Dept: PHYSICAL THERAPY | Age: 51
End: 2021-09-01
Payer: COMMERCIAL

## 2021-09-03 ENCOUNTER — HOSPITAL ENCOUNTER (OUTPATIENT)
Dept: PHYSICAL THERAPY | Age: 51
Discharge: HOME | End: 2021-09-03
Payer: COMMERCIAL

## 2021-09-03 PROCEDURE — 97110 THERAPEUTIC EXERCISES: CPT

## 2021-09-03 NOTE — PROGRESS NOTES
Mike Kaur  : 1970  Primary: Maggi Gigichase Plunkettr[place]rakan  Secondary:  [address] at [facility]  [address] [facility], [facility], [address]  [phone]   [phone]        TREATMENT:    PT Patient Time In/Time Out  Time In: 0845  Time Out: 30      Total Time: 60 min  Visit Count:  32     ICD-10: Treatment Diagnosis: M53.3, Z47.89, M67.951  Medication Last Reviewed: 21      TREATMENT PLAN  Effective Dates: 2021 TO 2021 (90 days). Frequency/Duration: 2 times a week for 90 Day(s)       Subjective: Patient reports leg was a bit sore after last treatment visit. She states it feels tight today.  Pain: 0/10    Objective: Therapeutic Exercise:  (60min) Done in order to improve strength, ROM and understanding of current condition. Date:   Date:   Date  21   Activity/Exercise      Education      NuStep x10min (adding 1.0 every 2 minutes 1.0-6.0)     Treadmill x10min @ 2. 3mph x10min @ 2. 3mph X 10 min 2.3 mph   SLR      Hip abd        Knee to chest      Trunk rotation      clams      Sit to stand      Hip flexor stretching      Bridges      Step Ups x20 8\"  X 20 8\"   Sidestepping 3x40ft red TB  X 50 ft red TB   Mini Squats  3x10 (red TB ABD hold)  3 x 10   Hip flexor stretching   4 x 1 min   Quad stretching ( prone)    4 x 1 min with stretch stap   3 way hip machine ABD: 3x10 B 25lbs  Ext: 3x10 B 50lbs ABD: 3x10 B 25lbs  Ext: 3x10 B 50lbs ABD: 3x10 B 25lbs  Ext: 3x10 B 50lbs   Bird Dogs 3x10 B (legs only)  3x10 B (legs only)   Monster Walks 3x40ft red TB     Hamstring stretching   4 x 1min           Aquatic Therapy: (min) Done in order to improve strength, ROM and understanding of current condition.     Date  21 Date:   Date   Date:     Activity/Exercise       SLR 3 x 10  3 x 10    Hip Extension 2 x 15 B 2x15 B 2x15 B 2x20 B   Step Ups 3 x 30 x15 (full staircase)  x20 (full staircase)   Hip ABD 2 x 15 B 2x15 B 3 x 15B 2x20 B Sidestepping       Walking       Marching       Leg Swings Flex --> Ext       Circles        SL Balance  4x  4x   bicycle  x 10 min  X 10 min    Hip flex /knee ext 3 x 10 B      squats 3 x 10 3x10 3 x 10 3x10 (middle step)   Quad stretching       Lateral Step Downs 3 x 5 B  2 x 10 2x10 (middle step)   Hip Flexion       Jogging 4 min 4x1min  4x1min   Hip flexor stretching 4 x 1 min  4 x 1 min    Modified quad stretching 4 x 1 min  4 x 1 min    Lateral Lunges 2 x 10 2x10 B 3 x 10 3x10       Manual Therapy: (0min) Done in order to improve joint and soft tissue mobility,reduce muscle guarding, and decrease muscle tone   Date:  7/8 Date:  8/18 Date:   Date:     Type Parameters      Joint Mobilization       Soft Tissue Mobilization To quad and IT band Lateral hip         Modalities: (-) Done in order to reduce swelling and pain    Assessment: Patient continues to progress well. Better ambulation and good improvement in strength.      Plan: continue with aquatics, progressing to more land    Future Appointments   Date Time Provider Felisha Jewell   9/7/2021  8:45 AM Maryella Been [facility]   9/10/2021  8:45 [facility]   9/13/2021  8:45 AM [facility]   9/17/2021  8:45 AM [facility]   9/20/2021  8:45 AM [facility]   9/21/2021  8:45 AM NUCLEAR STRESS L [facility]   9/24/2021  8:45 AM Ramond Bodily, PT [facility]   9/27/2021  7:30 AM ECHO 61 L [facility]   11/22/2021 10:00 AM sol Thompson MD [facility]       Unbilled Time:   Units:   4TE/ AQ      Alex Antony PTA    Visit Approval Visit # Therapist initials Date A NS / Cx < 24 hr >24 hr Cx Comments    1 WJ 5/28 [x]  [] [] Initial evaluation    2 W 6/1 [x] [] []     3 W 6/3 [x] [] []     4 [address] 6/8 [x] [] []     5 [address] 6/11 [x] [] []     6 [address] 6/15 [x] [] []     7 [address] 6-18 [x] [] []     8 [address] 6-22 [x] [] []     [address] 6-24 [x] [] []     10 [address] 6-29 [x] [] [] Progress Note    11 [address] 7-1 [x] [] []     12 [address] 7/8 [x] [] []     13 WJ 7/13 [x] [] []     14 WJ 7/19 [x] [] []     15 WJ 7/21 [x] [] []     16 WJ 7/22 [x] [] []     17 [address] 7/26 [x] [] []     18 [address] 7/28 [x] [] []     19 WJ 7/29 [x]       20 [address] 8/2 [x]       21 [address] 8/4 [x]       22 [address] 8/5 [x]       23 [address] 8/9 [x]       24 [address] 8/11 [x]       25 [address] 8/13 [x]       26 WJ 8/71 [x]   Recert note    27 WJ 0/95 [x]       28 [address] 8/23 [x]       29 WJ 8/25 [x]       30 WJ 8/27 [x]       [address] 8/30 [x]       [address] 9/3 [x]          []          []          []          []          []          []

## 2021-09-07 ENCOUNTER — HOSPITAL ENCOUNTER (OUTPATIENT)
Dept: PHYSICAL THERAPY | Age: 51
Discharge: HOME | End: 2021-09-07
Payer: COMMERCIAL

## 2021-09-07 PROCEDURE — 97110 THERAPEUTIC EXERCISES: CPT

## 2021-09-07 NOTE — PROGRESS NOTES
Debra Dixon  : 1970  Primary: Rubi Woodard Of [place] Ramirez*  Secondary:  [facility]  [address], [address]  [phone]   [phone]        TREATMENT:    PT Patient Time In/Time Out  Time In: 45  Time Out: 945      Total Time: 60 min  Visit Count:  33     ICD-10: Treatment Diagnosis: M53.3, Z47.89, M67.951  Medication Last Reviewed: 21      TREATMENT PLAN  Effective Dates: 2021 TO 2021 (90 days). Frequency/Duration: 2 times a week for 90 Day(s)       Subjective: Patient reports other leg is sore today.  Pain: 3/10    Objective: Therapeutic Exercise:  (60min) Done in order to improve strength, ROM and understanding of current condition. Date  21   Activity/Exercise     Education     NuStep     Treadmill X 10 min 2.3 mph X 10 min 2.3 mph   SLR     Hip abd       Knee to chest     Trunk rotation     clams  3 x 10   Sit to stand     Hip flexor stretching  3 x 1 min B   Bridges     Step Ups X 20 8\"    Sidestepping X 50 ft red TB X 50 ft red TB   Mini Squats  3 x 10 3 x 10    Hip flexor stretching 4 x 1 min    Quad stretching ( prone)  4 x 1 min with stretch srtap 4 x 1 min with stretch strap   3 way hip machine ABD: 3x10 B 25lbs  Ext: 3x10 B 50lbs ABD: 3x10 B 25lbs  Ext: 3x10 B 50lbs   Bird Dogs 3x10 B (legs only) 3x10 B (legs only)   Monster Walks     Hamstring stretching 4 x 1min   4 x 1min   Shuttle leg press  #125 3 x 10     Aquatic Therapy: (min) Done in order to improve strength, ROM and understanding of current condition.     Date  21 Date:   Date   Date:     Activity/Exercise       SLR 3 x 10  3 x 10    Hip Extension 2 x 15 B 2x15 B 2x15 B 2x20 B   Step Ups 3 x 30 x15 (full staircase)  x20 (full staircase)   Hip ABD 2 x 15 B 2x15 B 3 x 15B 2x20 B   Sidestepping       Walking       Marching       Leg Swings Flex --> Ext       Circles        SL Balance  4x  4x   bicycle x 10 min  X 10 min    Hip flex /knee ext 3 x 10 B      squats 3 x 10 3x10 3 x 10 3x10 (middle step)   Quad stretching       Lateral Step Downs 3 x 5 B  2 x 10 2x10 (middle step)   Hip Flexion       Jogging 4 min 4x1min  4x1min   Hip flexor stretching 4 x 1 min  4 x 1 min    Modified quad stretching 4 x 1 min  4 x 1 min    Lateral Lunges 2 x 10 2x10 B 3 x 10 3x10       Manual Therapy: (0min) Done in order to improve joint and soft tissue mobility,reduce muscle guarding, and decrease muscle tone   Date:  7/8 Date:  8/18 Date:   Date:     Type Parameters      Joint Mobilization       Soft Tissue Mobilization To quad and IT band Lateral hip         Modalities: (-) Done in order to reduce swelling and pain    Assessment: Patient continues to progress well. Better ambulation and good improvement in strength.  Less discomfort following treatment today    Plan: continue with aquatics, progressing to more land    Future Appointments   Date Time Provider Felisha Cunninghamisti   9/10/2021  8:45 AM Silvina Black PT [facility]   9/13/2021  8:45 AM Lashae Ziegler [facility]   9/17/2021  8:45 AM Lashae Ziegler [facility]   9/20/2021  8:45 AM Silvina Black PT [facility]   9/21/2021  8:45 AM NUCLEAR STRESS [facility]   9/24/2021  8:45 AM Silvina Black PT [facility]   9/27/2021  7:30 AM ECHO 63 [facility]   11/22/2021 10:00 [facility]       Unbilled Time:   Units:   4TE/ AQ      Efra Sheridan PTA    Visit Approval Visit # Therapist initials Date A NS / Cx < 24 hr >24 hr Cx Comments    1 WJ 5/28 [x]  [] [] Initial evaluation    2 WJ 6/1 [x] [] []     3 WJ 6/3 [x] [] []     [address] 6/8 [x] [] []     [address] 6/11 [x] [] []     [address] 6/15 [x] [] []     [address] 6-18 [x] [] []     [address] 6-22 [x] [] []     [address] 6-24 [x] [] []     [address] 6-29 [x] [] [] Progress Note    [address] 7-1 [x] [] []     [address] 7/8 [x] [] []     13 WJ 7/13 [x] [] []     14  7/19 [x] [] []     15 WJ 7/21 [x] [] []     16 WJ 7/22 [x] [] []     17 [address] 7/26 [x] [] []     [address] 7/28 [x] [] []     19 WJ 7/29 [x]       [address] 8/2 [x]       [address] 8/4 [x]       [address] 8/5 [x]       [address] 8/9 [x]       [address] 8/11 [x]       [address] 8/13 [x]       26 WJ 1/96 [x]   Recert note    27 WJ 2/15 [x]       [address] 8/23 [x]       29 WJ 8/25 [x]       30 WJ 8/27 [x]       [address] 8/30 [x]       [address] 9/3 [x]       [address] 9/7 [x]          []          []          []          []          []

## 2021-09-10 ENCOUNTER — HOSPITAL ENCOUNTER (OUTPATIENT)
Dept: PHYSICAL THERAPY | Age: 51
Discharge: HOME | End: 2021-09-10
Payer: COMMERCIAL

## 2021-09-10 PROCEDURE — 97110 THERAPEUTIC EXERCISES: CPT

## 2021-09-10 NOTE — PROGRESS NOTES
Kori Duty  : 1970  Primary: Zaira Emmanuel Of [place] Ramirez*  Secondary:  [facility]  [address], [address]  [phone]   [phone]        TREATMENT:    PT Patient Time In/Time Out  Time In: 3771  Time Out: 0945      Total Time: 55 min  Visit Count:  34     ICD-10: Treatment Diagnosis: M53.3, Z47.89, M67.951  Medication Last Reviewed: 09/10/21      TREATMENT PLAN  Effective Dates: 2021 TO 2021 (90 days). Frequency/Duration: 2 times a week for 90 Day(s)       Subjective: Patient reports other leg is still sore    Pain: 3/10    Objective: Therapeutic Exercise:  (55min) Done in order to improve strength, ROM and understanding of current condition. Date  21 Date:  21 Date:  9/10    Activity/Exercise       Education       NuStep       Treadmill X 10 min 2.3 mph X 10 min 2.3 mph x10min @ 2.5mph    SLR       Hip abd         Knee to chest       Trunk rotation       clams  3 x 10     Sit to stand       Hip flexor stretching  3 x 1 min B     Bridges       Step Ups X 20 8\"      Sidestepping X 50 ft red TB X 50 ft red TB 3x40ft red TB    Mini Squats  3 x 10 3 x 10  3x10 10lbs    Hip flexor stretching 4 x 1 min      Quad stretching ( prone)  4 x 1 min with stretch srtap 4 x 1 min with stretch strap     3 way hip machine ABD: 3x10 B 25lbs  Ext: 3x10 B 50lbs ABD: 3x10 B 25lbs  Ext: 3x10 B 50lbs ABD: 3x10 B 30lbs  Ext: 3x10 B 55lbs    Bird Dogs 3x10 B (legs only) 3x10 B (legs only)     Monster Walks   3x40ft red TB    Hamstring stretching 4 x 1min   4 x 1min     Shuttle leg press  #125 3 x 10 · 2x20 125lbs  · 3x10 B S/L SL press 50lbs ·      Aquatic Therapy: (min) Done in order to improve strength, ROM and understanding of current condition.     Date  21 Date:   Date   Date:     Activity/Exercise       SLR 3 x 10  3 x 10    Hip Extension 2 x 15 B 2x15 B 2x15 B 2x20 B   Step Ups 3 x 30 x15 (full staircase)  x20 (full staircase)   Hip ABD 2 x 15 B 2x15 B 3 x 15B 2x20 B   Sidestepping       Walking       Marching       Leg Swings Flex --> Ext       Circles        SL Balance  4x  4x   bicycle  x 10 min  X 10 min    Hip flex /knee ext 3 x 10 B      squats 3 x 10 3x10 3 x 10 3x10 (middle step)   Quad stretching       Lateral Step Downs 3 x 5 B  2 x 10 2x10 (middle step)   Hip Flexion       Jogging 4 min 4x1min  4x1min   Hip flexor stretching 4 x 1 min  4 x 1 min    Modified quad stretching 4 x 1 min  4 x 1 min    Lateral Lunges 2 x 10 2x10 B 3 x 10 3x10       Manual Therapy: (0min) Done in order to improve joint and soft tissue mobility,reduce muscle guarding, and decrease muscle tone   Date:  7/8 Date:  8/18 Date:   Date:     Type Parameters      Joint Mobilization       Soft Tissue Mobilization To quad and IT band Lateral hip         Modalities: (-) Done in order to reduce swelling and pain    Assessment: Patient continues to increase weights with exercise    Plan: continue per POC    Future Appointments   Date Time Provider Felisha Jewell   9/13/2021  8:45 AM Marina [facility]   9/17/2021  8:45 AM Marina [facility]   9/20/2021  8:45 AM Katelynn Frank PT [facility]   9/21/2021  8:45 AM NUCLEAR STRESS L [facility]   9/24/2021  8:45 AM Katelynn Frank PT [facility]   9/27/2021  7:30 AM ECHO 63 L [facility]   11/22/2021 10:00 AM sol Victoria MD [facility]       Unbilled Time:   Units:   4TE      Harper Reilly, PT, DPT, OCS    Visit Approval Visit # Therapist initials Date A NS / Cx < 24 hr >24 hr Cx Comments    1 WJ 5/28 [x]  [] [] Initial evaluation    2 WJ 6/1 [x] [] []     3 WJ 6/3 [x] [] []     4  6/8 [x] [] []     [address] 6/11 [x] [] []     6 [address] 6/15 [x] [] []     7  6-18 [x] [] []     8 KH 6-22 [x] [] []     9 KH 6-24 [x] [] []     [address] 6-29 [x] [] [] Progress Note    [address] 7-1 [x] [] []     [address] 7/8 [x] [] []     2328 [facility] 7/13 [x] [] []     14 WJ 7/19 [x] [] []     15 WJ 7/21 [x] [] []     16 WJ 7/22 [x] [] []     [address] 7/26 [x] [] []     [address] 7/28 [x] [] []     19 WJ 7/29 [x]       [address] 8/2 [x]       [address] 8/4 [x]       [address] 8/5 [x]       [address] 8/9 [x]       [address] 8/11 [x]       25 [address] 8/13 [x]       26 WJ 4/82 [x]   Recert note    27 WJ 7/35 [x]       [address] 8/23 [x]       29 WJ 8/25 [x]       30 WJ 8/27 [x]       [address] 8/30 [x]       [address] 9/3 [x]       [address] 9/7 [x]       29 WJ 9/10 [x]          []          []          []          []

## 2021-09-13 ENCOUNTER — HOSPITAL ENCOUNTER (OUTPATIENT)
Dept: PHYSICAL THERAPY | Age: 51
Discharge: HOME | End: 2021-09-13
Payer: COMMERCIAL

## 2021-09-13 PROCEDURE — 97110 THERAPEUTIC EXERCISES: CPT

## 2021-09-13 NOTE — PROGRESS NOTES
Sebastian Eddy  : 1970  Primary: Cris Koyanagi Of [place] Ramirez*  Secondary:  [facility]  [address], [address]  [phone]   [phone]        TREATMENT:    PT Patient Time In/Time Out  Time In: 845  Time Out: 945      Total Time: 60 min  Visit Count:  35     ICD-10: Treatment Diagnosis: M53.3, Z47.89, M67.951  Medication Last Reviewed: 21      TREATMENT PLAN  Effective Dates: 2021 TO 2021 (90 days). Frequency/Duration: 2 times a week for 90 Day(s)       Subjective: Patient reports feeling ok today not as sore today.  Pain: 2/10    Objective: Therapeutic Exercise:  (60min) Done in order to improve strength, ROM and understanding of current condition.     Date  21 Date:  21 Date:  9/10 Date  21   Activity/Exercise       Education       NuStep       Treadmill X 10 min 2.3 mph X 10 min 2.3 mph x10min @ 2.5mph x10min @ 2.5mph   SLR       Hip abd         Knee to chest       Trunk rotation       clams  3 x 10     Sit to stand       Hip flexor stretching  3 x 1 min B     Bridges       Step Ups X 20 8\"      Sidestepping X 50 ft red TB X 50 ft red TB 3x40ft red TB    Mini Squats  3 x 10 3 x 10  3x10 10lbs    Hip flexor stretching 4 x 1 min   4 x 1 min   Quad stretching ( prone)  4 x 1 min with stretch srtap 4 x 1 min with stretch strap  4 x 1 min with stretch strap   3 way hip machine ABD: 3x10 B 25lbs  Ext: 3x10 B 50lbs ABD: 3x10 B 25lbs  Ext: 3x10 B 50lbs ABD: 3x10 B 30lbs  Ext: 3x10 B 55lbs ABD: 3x10 B 30lbs  Ext: 3x10 B 55lbs   Bird Dogs 3x10 B (legs only) 3x10 B (legs only)     Monster Walks   3x40ft red TB    Hamstring stretching 4 x 1min   4 x 1min     Shuttle leg press  #125 3 x 10 · 2x20 125lbs  · 3x10 B S/L SL press 50lbs · 2x20 125lbs  · 3x10 B S/L SL press 50lbs   Squats from stool   ·  · # 20 2 x 10   Squats from floor   ·  · # 15 2 x 10   Single leg dead lifts   ·    2 x 10     Aquatic Therapy: (min) Done in order to improve strength, ROM and understanding of current condition. Date  8/13/21 Date:  8/20 Date  8/23 Date:  8/27   Activity/Exercise       SLR 3 x 10  3 x 10    Hip Extension 2 x 15 B 2x15 B 2x15 B 2x20 B   Step Ups 3 x 30 x15 (full staircase)  x20 (full staircase)   Hip ABD 2 x 15 B 2x15 B 3 x 15B 2x20 B   Sidestepping       Walking       Marching       Leg Swings Flex --> Ext       Circles        SL Balance  4x  4x   bicycle  x 10 min  X 10 min    Hip flex /knee ext 3 x 10 B      squats 3 x 10 3x10 3 x 10 3x10 (middle step)   Quad stretching       Lateral Step Downs 3 x 5 B  2 x 10 2x10 (middle step)   Hip Flexion       Jogging 4 min 4x1min  4x1min   Hip flexor stretching 4 x 1 min  4 x 1 min    Modified quad stretching 4 x 1 min  4 x 1 min    Lateral Lunges 2 x 10 2x10 B 3 x 10 3x10       Manual Therapy: (0min) Done in order to improve joint and soft tissue mobility,reduce muscle guarding, and decrease muscle tone   Date:  7/8 Date:  8/18 Date:   Date:     Type Parameters      Joint Mobilization       Soft Tissue Mobilization To quad and IT band Lateral hip         Modalities: (-) Done in order to reduce swelling and pain    Assessment: Patient tolerated treatment well today. Continues to be pleased with progress.     Plan: continue per POC    Future Appointments   Date Time Provider Felisha Jewell   9/17/2021  8:45 AM Marina [facility]   9/20/2021  8:45 AM Katelynn Frank, PT [facility]   9/21/2021  8:45 AM NUCLEAR STRESS GVL [facility]   9/24/2021  8:45 AM Katelynn Frank PT [facility]   9/27/2021  7:30 AM ECHO 63 GVL [facility]   11/22/2021 10:00 AM sol Victoria MD [facility]       Unbilled Time:   Units:   Bellalinda Medina, PTA    Visit Approval Visit # Therapist initials Date A NS / Cx < 24 hr >24 hr Cx Comments    1 WJ 5/28 [x]  [] [] Initial evaluation    2 W 6/1 [x] [] []     3 WJ 6/3 [x] [] []     4 [address] [facility] [address] 6/8 [x] [] []     5 [facility] 6/11 [x] [] []     6 [facility] 6/15 [x] [] []     7 [facility] 6-18 [x] [] []     8 [facility] 6-22 [x] [] []     9 [facility] 6-24 [x] [] []     10 [facility] 6-29 [x] [] [] Progress Note    11 [facility] 7-1 [x] [] []     12 [facility] 7/8 [x] [] []     13 WJ 7/13 [x] [] []     14 WJ 7/19 [x] [] []     15 WJ 7/21 [x] [] []     16 WJ 7/22 [x] [] []     17 [facility] 7/26 [x] [] []     18 [facility] 7/28 [x] [] []     19 WJ 7/29 [x]       20 KH 8/2 [x]       21 [facility] 8/4 [x]       22 [facility] 8/5 [x]       23 [facility] 8/9 [x]       24 [facility] 8/11 [x]       25 [facility] 8/13 [x]       26 WJ 6/00 [x]   Recert note    27 WJ 6/98 [x]       28 [facility] 8/23 [x]       29 WJ 8/25 [x]       30 WJ 8/27 [x]       31 [facility] 8/30 [x]       32 [facility] 9/3 [x]       33 [facility] 9/7 [x]       34 WJ 9/10 [x]       35 [facility] 9/13 [x]          []          []          []

## 2021-09-17 ENCOUNTER — HOSPITAL ENCOUNTER (OUTPATIENT)
Dept: PHYSICAL THERAPY | Age: 51
Discharge: HOME | End: 2021-09-17
Payer: COMMERCIAL

## 2021-09-17 PROCEDURE — 97110 THERAPEUTIC EXERCISES: CPT

## 2021-09-17 NOTE — PROGRESS NOTES
Mike Kaur  : 1970  Primary: Terrance Carreon Of [facility] Mahmoodlarry*  Secondary:  [facility] at [facility]  [address] [facility], [facility], [address]  [phone]   [phone]        TREATMENT:    PT Patient Time In/Time Out  Time In: 0845  Time Out: 8273      Total Time: 60 min  Visit Count:  36     ICD-10: Treatment Diagnosis: M53.3, Z47.89, M67.951  Medication Last Reviewed: 21      TREATMENT PLAN  Effective Dates: 2021 TO 2021 (90 days). Frequency/Duration: 2 times a week for 90 Day(s)       Subjective: Patient reports leg is feeling a little better than it did at last visit.  Pain: 2/10    Objective: Therapeutic Exercise:  (60min) Done in order to improve strength, ROM and understanding of current condition. Date:  9/10 Date  21 Date  21   Activity/Exercise      Education      NuStep      Treadmill x10min @ 2.5mph x10min @ 2.5mph x10min @ 2.5mph   SLR      Hip abd        Knee to chest      Trunk rotation      clams      Sit to stand      Hip flexor stretching      Bridges      Step Ups      Sidestepping 3x40ft red TB  3x40ft red TB   Mini Squats  3x10 10lbs  3x10 10lbs   Hip flexor stretching  4 x 1 min 4 x 1 min   Quad stretching ( prone)   4 x 1 min with stretch strap 4 x 1 min with stretch strap   3 way hip machine ABD: 3x10 B 30lbs  Ext: 3x10 B 55lbs ABD: 3x10 B 30lbs  Ext: 3x10 B 55lbs ABD: 3x10 B 30lbs  Ext: 3x10 B 55lbs   Bird Dogs      Monster Walks 3x40ft red TB     Hamstring stretching      Shuttle leg press · 2x20 125lbs  · 3x10 B S/L SL press 50lbs · 2x20 125lbs  · 3x10 B S/L SL press 50lbs · 2x20 125lbs  · 3x10 B S/L SL press 50lbs   Squats from stool ·  · # 20 2 x 10 · #20 2 x10   Squats from floor ·  · # 15 2 x 10 · # 15 2 x 10   Single leg dead lifts ·    2 x 10 2 x 10     Aquatic Therapy: (min) Done in order to improve strength, ROM and understanding of current condition.     Date  21 Date:   Date   Date:  8/27   Activity/Exercise       SLR 3 x 10  3 x 10    Hip Extension 2 x 15 B 2x15 B 2x15 B 2x20 B   Step Ups 3 x 30 x15 (full staircase)  x20 (full staircase)   Hip ABD 2 x 15 B 2x15 B 3 x 15B 2x20 B   Sidestepping       Walking       Marching       Leg Swings Flex --> Ext       Circles        SL Balance  4x  4x   bicycle  x 10 min  X 10 min    Hip flex /knee ext 3 x 10 B      squats 3 x 10 3x10 3 x 10 3x10 (middle step)   Quad stretching       Lateral Step Downs 3 x 5 B  2 x 10 2x10 (middle step)   Hip Flexion       Jogging 4 min 4x1min  4x1min   Hip flexor stretching 4 x 1 min  4 x 1 min    Modified quad stretching 4 x 1 min  4 x 1 min    Lateral Lunges 2 x 10 2x10 B 3 x 10 3x10       Manual Therapy: (0min) Done in order to improve joint and soft tissue mobility,reduce muscle guarding, and decrease muscle tone   Date:  7/8 Date:  8/18 Date:   Date:     Type Parameters      Joint Mobilization       Soft Tissue Mobilization To quad and IT band Lateral hip         Modalities: (-) Done in order to reduce swelling and pain    Assessment: Patient tolerated treatment well today. Continues to be pleased with progress. Patient indicates that she has a work fel[employer] appointment next week.     Plan: continue per POC    Future Appointments   Date Time Provider Felisha Jewell   9/20/2021  8:45 AM ANT [facility]   9/21/2021  8:45 AM NUCLEAR STRESS [facility]   9/24/2021 11:00 AM Jessie Logan PT [facility]   9/27/2021  7:30 AM ECHO 63 L [facility]   11/22/2021 10:00 AM sol Heath MD [facility]       Unbilled Time:   Units:   Siddhartha Altman PTA    Visit Approval Visit # Therapist initials Date A NS / Cx < 24 hr >24 hr Cx Comments    1 WJ 5/28 [x]  [] [] Initial evaluation    2 WJ 6/1 [x] [] []     3 WJ 6/3 [x] [] []     [address] 6/8 [x] [] []     5 [address] 6/11 [x] [] []     6 [address] 6/15 [x] [] []     7 [address] 6-18 [x] [] []     8 [address] 6-22 [x] [] []     9  6-24 [x] [] []     [address] 6-29 [x] [] [] Progress Note    [address] 7-1 [x] [] []     [address] 7/8 [x] [] []     13 WJ 7/13 [x] [] []     14 WJ 7/19 [x] [] []     15 WJ 7/21 [x] [] []     16 WJ 7/22 [x] [] []     17 [address] 7/26 [x] [] []     18 [address] 7/28 [x] [] []     19 WJ 7/29 [x]       20 [address] 8/2 [x]       21 [address] 8/4 [x]       22 [address] 8/5 [x]       23 [address] 8/9 [x]       24 [address] 8/11 [x]       25 [address] 8/13 [x]       26 WJ 0/24 [x]   Recert note    27 WJ 6/29 [x]       [address] 8/23 [x]       29 WJ 8/25 [x]       30 WJ 8/27 [x]       [address] 8/30 [x]       [address] 9/3 [x]       [address] 9/7 [x]       34 WJ 9/10 [x]       35 [address] 9/13 [x]       [address] 9/17 [x]          []          []

## 2021-09-20 ENCOUNTER — HOSPITAL ENCOUNTER (OUTPATIENT)
Dept: PHYSICAL THERAPY | Age: 51
Discharge: HOME | End: 2021-09-20
Payer: COMMERCIAL

## 2021-09-20 PROCEDURE — 97110 THERAPEUTIC EXERCISES: CPT

## 2021-09-20 NOTE — PROGRESS NOTES
Ronda Mccarty  : 1970  Primary: [facility]*  Secondary:  [facility]rapy Center at [address], [address]  [phone]   [phone]        TREATMENT:           Total Time: 60 min  Visit Count:  37     ICD-10: Treatment Diagnosis: M53.3, Z47.89, M67.951  Medication Last Reviewed: 21      TREATMENT PLAN  Effective Dates: 2021 TO 2021 (90 days). Frequency/Duration: 2 times a week for 90 Day(s)       Subjective: Patient states she is feeling better today in both legs   Pain: 1/10    Objective: Therapeutic Exercise:  (55min) Done in order to improve strength, ROM and understanding of current condition.     Date:  9/10 Date  21 Date  21 Date:     Activity/Exercise       Education       NuStep       Treadmill x10min @ 2.5mph x10min @ 2.5mph x10min @ 2.5mph x15min @ 2.5mph (5min hills)   SLR       Hip abd         Knee to chest       Trunk rotation       clams       Sit to stand       Hip flexor stretching       Walking Lunges    3x1min   Step Ups       Sidestepping 3x40ft red TB  3x40ft red TB    Mini Squats  3x10 10lbs  3x10 10lbs 3x1min 10lbs   Hip flexor stretching  4 x 1 min 4 x 1 min    Quad stretching ( prone)   4 x 1 min with stretch strap 4 x 1 min with stretch strap    3 way hip machine ABD: 3x10 B 30lbs  Ext: 3x10 B 55lbs ABD: 3x10 B 30lbs  Ext: 3x10 B 55lbs ABD: 3x10 B 30lbs  Ext: 3x10 B 55lbs ABD: 3x10 B 45lbs  Ext: 3x10 B 62.5lbs   Bird Dogs       Monster Walks 3x40ft red TB      Hamstring stretching       Shuttle leg press · 2x20 125lbs  · 3x10 B S/L SL press 50lbs · 2x20 125lbs  · 3x10 B S/L SL press 50lbs · 2x20 125lbs  · 3x10 B S/L SL press 50lbs · 2x20 125lbs  · 2x10 B S/L 50lbs   Squats from stool ·  · # 20 2 x 10 · #20 2 x10 ·    Squats from floor ·  · # 15 2 x 10 · # 15 2 x 10 · 3x15 10lbs   Single leg dead lifts ·    2 x 10 2 x 10      Aquatic Therapy: (min) Done in order to improve strength, ROM and understanding of current condition. Date  8/13/21 Date:  8/20 Date  8/23 Date:  8/27   Activity/Exercise       SLR 3 x 10  3 x 10    Hip Extension 2 x 15 B 2x15 B 2x15 B 2x20 B   Step Ups 3 x 30 x15 (full staircase)  x20 (full staircase)   Hip ABD 2 x 15 B 2x15 B 3 x 15B 2x20 B   Sidestepping       Walking       Marching       Leg Swings Flex --> Ext       Circles        SL Balance  4x  4x   bicycle  x 10 min  X 10 min    Hip flex /knee ext 3 x 10 B      squats 3 x 10 3x10 3 x 10 3x10 (middle step)   Quad stretching       Lateral Step Downs 3 x 5 B  2 x 10 2x10 (middle step)   Hip Flexion       Jogging 4 min 4x1min  4x1min   Hip flexor stretching 4 x 1 min  4 x 1 min    Modified quad stretching 4 x 1 min  4 x 1 min    Lateral Lunges 2 x 10 2x10 B 3 x 10 3x10       Manual Therapy: (0min) Done in order to improve joint and soft tissue mobility,reduce muscle guarding, and decrease muscle tone   Date:  7/8 Date:  8/18 Date:   Date:     Type Parameters      Joint Mobilization       Soft Tissue Mobilization To quad and IT band Lateral hip         Modalities: (-) Done in order to reduce swelling and pain    Assessment: Patient continues to progress well in her treatment, increasing time spent exercising and load of exercise.  Prep for D/C to work hardening    Plan: continue per [facility]    Future Appointments   Date Time Provider Felisha Jewell   9/21/2021  8:45 AM NUCLEAR STRESS L [facility]   9/24/2021 11:00 AM Clara Martinez PT [facility]   9/27/2021  7:30 AM ECHO 61 L [facility]   11/22/2021 10:00 AM sol Phelps MD [facility]       Unbilled Time:   Units:   Linda Reyes, PT, DPT, OCS    Visit Approval Visit # Therapist initials Date A NS / Cx < 24 hr >24 hr Cx Comments    1 W 5/28 [x]  [] [] Initial evaluation    2 W 6/1 [x] [] []     3 W 6/3 [x] [] []     4 [address] 6/8 [x] [] []     [address] 6/11 [x] [] []     [address] 6/15 [x] [] []     7 [address] 6-18 [x] [] [] 8 KH 6-22 [x] [] []     9 KH 6-24 [x] [] []     [address] 6-29 [x] [] [] Progress Note    [address] 7-1 [x] [] []     [address] 7/8 [x] [] []     13 WJ 7/13 [x] [] []     14 WJ 7/19 [x] [] []     15 WJ 7/21 [x] [] []     16 WJ 7/22 [x] [] []     17 [address] 7/26 [x] [] []     [address] 7/28 [x] [] []     19 WJ 7/29 [x]       [address] 8/2 [x]       21 [address] 8/4 [x]       [address] 8/5 [x]       23 [address] 8/9 [x]       [address] 8/11 [x]       25 [address] 8/13 [x]       26 WJ 1/47 [x]   Recert note    27 WJ 5/24 [x]       [address] 8/23 [x]       29 WJ 8/25 [x]       30 WJ 8/27 [x]       [address] 8/30 [x]       [address] 9/3 [x]       [address] 9/7 [x]       34 WJ 9/10 [x]       [address] 9/13 [x]       [address] 9/17 [x]       37 WJ 9/20 [x]          [] aerosol mask

## 2021-09-24 ENCOUNTER — HOSPITAL ENCOUNTER (OUTPATIENT)
Dept: PHYSICAL THERAPY | Age: 51
Discharge: HOME | End: 2021-09-24
Payer: COMMERCIAL

## 2021-09-24 PROCEDURE — 97110 THERAPEUTIC EXERCISES: CPT

## 2021-09-24 NOTE — THERAPY DISCHARGE
Franci Poser  : 1970  Primary: [facility]*  Secondary:  [facility]rapy Center at [facility]  [address], [address]  [phone]   [phone]          OUTPATIENT PHYSICAL THERAPY:Discharge Summary 2021   ICD-10: Treatment Diagnosis: M53.3, Z47.89, M67.951  Precautions/Allergies:   Latex, natural rubber   TREATMENT PLAN:  Effective Dates: 2021 TO 2021 (90 days). Frequency/Duration: 2 times a week for 90 Day(s) MEDICAL/REFERRING DIAGNOSIS:  Sacrococcygeal disorders, not elsewhere classified [M53.3]  Encounter for other orthopedic aftercare [Z47.89]  Unspecified disorder of synovium and tendon, right thigh [M67.951]   DATE OF ONSET: 10/23/2020 surgery  REFERRING PHYSICIAN: Priya Tamayo MD MD Orders: Desiree Linton and treat   Return MD Appointment:      INITIAL ASSESSMENT:  Ms. Lena Sanchez presents to physical therapy with MD diagnosis of a SI joint pain, s/p right gluteal repair. Pt demonstrated signs and symptoms consistent with this diagnosis. On objective examination, the patient demonstrated deficits in ROM, strength, joint mobility, soft tissue mobility, functional ability. The patient also had increased pain. The patient is limited in the following activities: walking, standing, ADLs, functional tasks, work. The patient has a good  prognosis for recovery based on the examination findings and may be limited by: N/A. Patient requires skilled physical therapy services in order to return to prior level of function. REASSESSMENT: Ms. Lena Sanchez presented to physical therapy with MD diagnosis of a SI joint pain. Pt demonstrated signs and symptoms consistent with this diagnosis. On objective examination, the patient demonstrated improvements in ROM, strength, joint mobility, soft tissue mobility, functional ability, ambulatory ability.  The patient also has decreased pain These improvements have increased the patient's ability to: walk, stand, ADLs, functional tasks. The patient is still limited in the following activities: lifting, walking > 20min, standing, work, functional activities. The patient has a good prognosis for recovery based on the examination findings and may be limited by: N/A. Patient continues to require skilled physical therapy services in order to return to prior level of function. REASSESSMENT 8/18:  Ms. Maykel Damian presented to physical therapy with MD diagnosis of a SI joint pain. Pt demonstrated signs and symptoms consistent with this diagnosis. On objective examination, the patient demonstrated improvements in ROM, strength, joint mobility, soft tissue mobility, functional ability, ambulatory ability. The patient also has decreased pain These improvements have increased the patient's ability to: walk stand, exercise, ADLs, functional tasks. The patient is still limited in the following activities: lifting, walking > 60min, work, functional activities. The patient has a good prognosis for recovery based on the examination findings and may be limited by: N/A. Patient continues to require skilled physical therapy services in order to return to prior level of function. DISCHARGE ASSESSMENT: Ms. Maykel Damian presented to physical therapy with MD diagnosis of a SI joint pain. Pt demonstrated signs and symptoms consistent with this diagnosis. On objective examination, the patient demonstrated improvements in ROM, strength, joint mobility, soft tissue mobility, functional ability, functional mobility, ambulatory ability. The patient also has decreased pain These improvements have increased the patient's ability to: walk, stand, work, functional activities, ADLs, exercise. The patient is still limited in the following activities: heavy lifting. The patient is being discharged from therapy at this time due to achieving goals and being independent with HEP          PROBLEM LIST (Impacting functional limitations):  1.  Decreased Strength  2. Decreased Activity Tolerance INTERVENTIONS PLANNED: (Treatment may consist of any combination of the following)  1. Cold  2. Heat  3. Home Exercise Program (HEP)  4. Manual Therapy  5. Neuromuscular Re-education/Strengthening  6. Range of Motion (ROM)  7. Therapeutic Activites  8. Therapeutic Exercise/Strengthening     GOALS: (Goals have been discussed and agreed upon with patient.)  Short-Term Functional Goals: Time Frame: 5 weeks  1. Pt will be compliant with progressive HEP-- goal met  2. Pt will be able to do 6 sit to stands in 30sec with hands-- goal met  3. Pt will improve LEFS score by 6pts-- goal met  Discharge Goals: Time Frame: 10 weeks  1. Pt will improve LEFS score by 12pts-- goal met  2. Pt will have Hip ABD strength of 4/5 with pain < 3/10-- goal met  3. Pt will decrease TUG time to < 12sec-- goal met    OUTCOME MEASURE:   Tool Used: Lower Extremity Functional Scale (LEFS)  Score:  Initial: 11/80 Most Recent: 70/80 (Date: 9/24/2021 )   Interpretation of Score: 20 questions each scored on a 5 point scale with 0 representing \"extreme difficulty or unable to perform\" and 4 representing \"no difficulty\". The lower the score, the greater the functional disability. 80/80 represents no disability. Minimal detectable change is 9 points. MEDICAL NECESSITY:   · Patient is being discharged from therapy at this time. Has met all goals and is independent with HEP. REASON FOR SERVICES/OTHER COMMENTS:  · Patient is being discharged from therapy at this time. Has met all goals and is independent with HEP. Total Duration:  PT Patient Time In/Time Out  Time In: 1110    Rehabilitation Potential For Stated Goals: Good  Regarding Dick Quevedo's therapy, I certify that the treatment plan above will be carried out by a therapist or under their direction.   Thank you for this referral,  Shad Overton PT, DPT, OCS  Referring Physician Signature: Lashell Haque MD _______________________________ Date _____________     PAIN/SUBJECTIVE:   Initial:   7/10 Post Session:  5/10   HISTORY:   History of Injury/Illness (Reason for Referral):  Pt is s/p R hip labral repair, glute repair on 10/23/2020. Since then the patient has had a lot of difficulty with pain, weakness. Pt was injured at work while pushing a rack. Pt has been going to therapy since Oct/Nov but is still having a lot of pain and weakness. Pain is primarily in the lateral hip, is constant, sore, throbbing, aching. Heat/ice is the only thing that helps  Past Medical History/Comorbidities:   Ms. Janeal Goldberg  has a past medical history of Chlamydia, COVID-19 vaccine series completed (04/20/2021), Depression (08/16/2011), Endometriosis, Gonorrhea, Hypertension, Migraines, and PID (acute pelvic inflammatory disease). Ms. Janeal Goldberg  has a past surgical history that includes hx pelvic laparoscopy (1993); hx other surgical (2020); hx heart catheterization; and colonoscopy (N/A, 6/21/2021). Social History/Living Environment:     Pt lives with family, 2 steps in house  Prior Level of Function/Work/Activity:  Works for [employer], light exercise  Personal Factors:          Sex:  female        Age:  46 y.o. Ambulatory/Rehab Services H2 Model Falls Risk Assessment   Risk Factors:       No Risk Factors Identified Ability to Rise from Chair:       (1)  Pushes up, successful in one attempt   Falls Prevention Plan:       No modifications necessary   Total: (5 or greater = High Risk): 1   ©2010 [facility] of [employer]. All Rights Reserved. [facility] Patent #[id number]. Federal Law prohibits the replication, distribution or use without written permission from [facility] of [address] [place] [address]   Current Medications:       Current Outpatient Medications:     rosuvastatin (CRESTOR) 10 mg tablet, Take 1 Tablet by mouth nightly. Indications: excessive fat in the blood, Disp: 90 Tablet, Rfl: 3    predniSONE (STERAPRED DS) 10 mg dose pack, Please take as directed on package.  Please clarify any questions with the Pharmacist., Disp: 21 Tablet, Rfl: 0    estradioL (ESTRACE) 0.01 % (0.1 mg/gram) vaginal cream, Apply externally and intravaginal qhs x 2weeks then on  and Wednesday. , Disp: 42.5 g, Rfl: 0    progesterone (PROMETRIUM) 200 mg capsule, Take QHS the 1-12 of every month., Disp: 12 Cap, Rfl: 5    estradioL (VIVELLE) 0.05 mg/24 hr, Change patch on sundays and Wednesday. , Disp: 24 Patch, Rfl: 3    amLODIPine (NORVASC) 5 mg tablet, TAKE 1 TABLET BY MOUTH EVERY DAY, Disp: 90 Tab, Rfl: 1    SUMAtriptan (IMITREX) 100 mg tablet, TAKE 1 TABLET BY MOUTH ONCE AS NEEDED FOR MIGRAINE FOR UP TO 1 DOSE, Disp: , Rfl:     venlafaxine-SR (EFFEXOR-XR) 37.5 mg capsule, Take 1 Cap by mouth daily. , Disp: 90 Cap, Rfl: 1    ibuprofen (MOTRIN) 800 mg tablet, Take 800 mg by mouth every eight (8) hours as needed. , Disp: , Rfl:     DISABLED PLACARD (DISABLED PLACARD) DMV, 1 Each., Disp: , Rfl:     butalbital-acetaminophen-caff (Fioricet) -40 mg per capsule, Take 1 Cap by mouth every four (4) hours as needed for Migraine. , Disp: 8 Cap, Rfl: 3    LORazepam (ATIVAN) 1 mg tablet, Take 1 Tab by mouth nightly as needed for Anxiety.  Max Daily Amount: 1 mg., Disp: 30 Tab, Rfl: 2   Date Last Reviewed:  21     Number of Personal Factors/Comorbidities that affect the Plan of Care: 1-2: MODERATE COMPLEXITY   EXAMINATION:   *= Painful     WNL= within normal limits     NT= not tested    Observation  Gait: normal gait, no pain      ROM   Right Left   Flexion 115 115   ER 55 55   IR 30 30     Strength (all MMT scores are graded on a scale of 0-5)   Right Left   Hip      Flexion 5 5   Abduction 4+ 5   Extension 5 5   Glute Max 5 5   ER 5 5   IR 5 5   Knee     Extension 5 5   Flexion 5 5       Joint/Soft Tissue Mobility   Description   Joint Mobility  Hip: improved mobility   Soft Tissue Mobility Minimal TTP     Functional Mobility Screen   30sec Sit to Stand: 23x (no hands)   TU.6sec           Body Structures Involved:  1. Bones  2. Joints  3. Muscles  4. Ligaments Body Functions Affected:  1. Sensory/Pain  2. Neuromusculoskeletal  3. Movement Related Activities and Participation Affected:  1. General Tasks and Demands  2. Mobility  3. Self Care  4. Domestic Life  5. Interpersonal Interactions and Relationships  6.  Community, Social and [place]   Number of elements (examined above) that affect the Plan of Care: 3: MODERATE COMPLEXITY   CLINICAL PRESENTATION:   Presentation: Stable and uncomplicated: LOW COMPLEXITY   CLINICAL DECISION MAKING:   Use of outcome tool(s) and clinical judgement create a POC that gives a: Clear prediction of patient's progress: LOW COMPLEXITY     Karyle Neighbours PT, DPT, OCS

## 2021-09-24 NOTE — PROGRESS NOTES
Serafin Escobarer  : 1970  Primary: Timothy [facility]*  Secondary:  [facility]rapy Center at [facility]  [address], [address]  [phone]   [phone]        TREATMENT:    PT Patient Time In/Time Out  Time In: 1105  Time Out: 1200      Total Time: 55 min  Visit Count:  38     ICD-10: Treatment Diagnosis: M53.3, Z47.89, M67.951  Medication Last Reviewed: 21      TREATMENT PLAN  Effective Dates: 2021 TO 2021 (90 days). Frequency/Duration: 2 times a week for 90 Day(s)       Subjective: See D/C Summary dated 2021 for details   Pain: 1/10    Objective: See D/C Summary dated 2021 for details      Therapeutic Exercise:  (55min) Done in order to improve strength, ROM and understanding of current condition.     Date  21 Date  21 Date:   Date:     Activity/Exercise       Education    D/C assessment, HEP review and demo   NuStep       Treadmill x10min @ 2.5mph x10min @ 2.5mph x15min @ 2.5mph (5min hills) x12min @ 2.5mph (hills)   SLR       Hip abd         Knee to chest       Trunk rotation       clams       Sit to stand       Hip flexor stretching       Walking Lunges   3x1min 3x1min   Step Ups       Sidestepping  3x40ft red TB     Mini Squats   3x10 10lbs 3x1min 10lbs 3x1min   Hip flexor stretching 4 x 1 min 4 x 1 min     Quad stretching ( prone)  4 x 1 min with stretch strap 4 x 1 min with stretch strap     3 way hip machine ABD: 3x10 B 30lbs  Ext: 3x10 B 55lbs ABD: 3x10 B 30lbs  Ext: 3x10 B 55lbs ABD: 3x10 B 45lbs  Ext: 3x10 B 62.5lbs ABD: 3x10 45lbs   Bird Dogs       Monster Walks       Hamstring stretching       Shuttle leg press · 2x20 125lbs  · 3x10 B S/L SL press 50lbs · 2x20 125lbs  · 3x10 B S/L SL press 50lbs · 2x20 125lbs  · 2x10 B S/L 50lbs ·    Squats from stool · # 20 2 x 10 · #20 2 x10 ·  ·    Squats from floor · # 15 2 x 10 · # 15 2 x 10 · 3x15 10lbs · 3x15 10lbs   Single leg dead lifts   2 x 10 2 x 10       Aquatic Therapy: (min) Done in order to improve strength, ROM and understanding of current condition.     Date  8/13/21 Date:  8/20 Date  8/23 Date:  8/27   Activity/Exercise       SLR 3 x 10  3 x 10    Hip Extension 2 x 15 B 2x15 B 2x15 B 2x20 B   Step Ups 3 x 30 x15 (full staircase)  x20 (full staircase)   Hip ABD 2 x 15 B 2x15 B 3 x 15B 2x20 B   Sidestepping       Walking       Marching       Leg Swings Flex --> Ext       Circles        SL Balance  4x  4x   bicycle  x 10 min  X 10 min    Hip flex /knee ext 3 x 10 B      squats 3 x 10 3x10 3 x 10 3x10 (middle step)   Quad stretching       Lateral Step Downs 3 x 5 B  2 x 10 2x10 (middle step)   Hip Flexion       Jogging 4 min 4x1min  4x1min   Hip flexor stretching 4 x 1 min  4 x 1 min    Modified quad stretching 4 x 1 min  4 x 1 min    Lateral Lunges 2 x 10 2x10 B 3 x 10 3x10       Manual Therapy: (0min) Done in order to improve joint and soft tissue mobility,reduce muscle guarding, and decrease muscle tone   Date:  7/8 Date:  8/18 Date:   Date:     Type Parameters      Joint Mobilization       Soft Tissue Mobilization To quad and IT band Lateral hip         Modalities: (-) Done in order to reduce swelling and pain    Assessment: See D/C Summary dated 9/24/2021 for details    Plan: See D/C Summary dated 9/24/2021 for details    Future Appointments   Date Time Provider Felisha Fanta   9/27/2021  7:30 AM ECHO 63 [facility]   11/22/2021 10:00 AM sol Britt MD [facility]       Unbilled Time:   Units:   4KEAGAN Lopes, PT, DPT, OCS    Visit Approval Visit # Therapist initials Date A NS / Cx < 24 hr >24 hr Cx Comments    1 WJ 5/28 [x]  [] [] Initial evaluation    2 WJ 6/1 [x] [] []     3 WJ 6/3 [x] [] []     4 KH 6/8 [x] [] []     [address] 6/11 [x] [] []     6 KH 6/15 [x] [] []     7  6-18 [x] [] []     8 KH 6-22 [x] [] []     9 KH 6-24 [x] [] []     [address] 6-29 [x] [] [] Progress Note    [address] 7-1 [x] [] []     [address] 7/8 [x] [] []     13 WJ 7/13 [x] [] []     14 WJ 7/19 [x] [] []     15 WJ 7/21 [x] [] []     16 WJ 7/22 [x] [] []     17 [address] 7/26 [x] [] []     18 [address] 7/28 [x] [] []     19 WJ 7/29 [x]       [address] 8/2 [x]       21 [address] 8/4 [x]       22 [address] 8/5 [x]       23 [address] 8/9 [x]       24 [address] 8/11 [x]       25 [address] 8/13 [x]       26 WJ 6/77 [x]   Recert note    27 WJ 7/89 [x]       [address] 8/23 [x]       29 WJ 8/25 [x]       30 WJ 8/27 [x]       [address] 8/30 [x]       [address] 9/3 [x]       [address] 9/7 [x]       34 WJ 9/10 [x]       35 [address] 9/13 [x]       [address] 9/17 [x]       37 WJ 9/20 [x]       38 WJ 9/24 [x]   Discharge summary, faxed

## 2022-03-09 ENCOUNTER — HOSPITAL ENCOUNTER (EMERGENCY)
Age: 52
Discharge: HOME | End: 2022-03-09
Attending: EMERGENCY MEDICINE
Payer: COMMERCIAL

## 2022-03-09 ENCOUNTER — APPOINTMENT (OUTPATIENT)
Dept: GENERAL RADIOLOGY | Age: 52
End: 2022-03-09
Attending: EMERGENCY MEDICINE
Payer: COMMERCIAL

## 2022-03-09 VITALS
HEIGHT: 66 IN | SYSTOLIC BLOOD PRESSURE: 124 MMHG | TEMPERATURE: 98.6 F | WEIGHT: 199.08 LBS | OXYGEN SATURATION: 97 % | BODY MASS INDEX: 31.99 KG/M2 | HEART RATE: 75 BPM | RESPIRATION RATE: 18 BRPM | DIASTOLIC BLOOD PRESSURE: 88 MMHG

## 2022-03-09 DIAGNOSIS — S81.852A CAT BITE OF LEFT LOWER LEG, INITIAL ENCOUNTER: Primary | ICD-10-CM
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
## 2022-03-09 PROCEDURE — 90715 TDAP VACCINE 7 YRS/> IM: CPT | Performed by: EMERGENCY MEDICINE

## 2022-03-09 PROCEDURE — 74011250636 HC RX REV CODE- 250/636: Performed by: EMERGENCY MEDICINE

## 2022-03-09 PROCEDURE — 74011250637 HC RX REV CODE- 250/637: Performed by: EMERGENCY MEDICINE

## 2022-03-09 PROCEDURE — 73590 X-RAY EXAM OF LOWER LEG: CPT

## 2022-03-09 PROCEDURE — 96372 THER/PROPH/DIAG INJ SC/IM: CPT

## 2022-03-09 PROCEDURE — 99284 EMERGENCY DEPT VISIT MOD MDM: CPT

## 2022-03-09 RX ORDER — HYDROCODONE BITARTRATE AND ACETAMINOPHEN 5; 325 MG/1; MG/1
1-2 TABLET ORAL
Qty: 11 TABLET | Refills: 0 | Status: SHIPPED | OUTPATIENT
Start: 2022-03-09 | End: 2022-03-12

## 2022-03-09 RX ORDER — AMOXICILLIN AND CLAVULANATE POTASSIUM 875; 125 MG/1; MG/1
1 TABLET, FILM COATED ORAL 2 TIMES DAILY
Qty: 20 TABLET | Refills: 0 | Status: SHIPPED | OUTPATIENT
Start: 2022-03-09

## 2022-03-09 RX ORDER — AMOXICILLIN AND CLAVULANATE POTASSIUM 875; 125 MG/1; MG/1
1 TABLET, FILM COATED ORAL
Status: COMPLETED | OUTPATIENT
Start: 2022-03-09 | End: 2022-03-09

## 2022-03-09 RX ORDER — NAPROXEN 500 MG/1
500 TABLET ORAL 2 TIMES DAILY WITH MEALS
Qty: 20 TABLET | Refills: 0 | Status: SHIPPED | OUTPATIENT
Start: 2022-03-09

## 2022-03-09 RX ADMIN — AMOXICILLIN AND CLAVULANATE POTASSIUM 1 TABLET: 875; 125 TABLET, FILM COATED ORAL at 22:48

## 2022-03-09 RX ADMIN — TETANUS TOXOID, REDUCED DIPHTHERIA TOXOID AND ACELLULAR PERTUSSIS VACCINE, ADSORBED 0.5 ML: 5; 2.5; 8; 8; 2.5 SUSPENSION INTRAMUSCULAR at 22:16

## 2022-03-10 NOTE — ED PROVIDER NOTES
49-year-old female patient presents to the ER with concerns over a cat bite to her left lower extremity. Patient reports that this morning her neighbor's cat scratched her left lower leg and likely bit Her x1 as well. Reports a wound with redness and swelling. Tetanus out of date  No fever vomiting or diarrhea    Patient is unaware of the cats vaccination status    The history is provided by the patient. Animal Bite   The incident occurred today. The incident occurred at home. There is an injury to the left lower leg. The pain is moderate. It is unknown if a foreign body is present. Pertinent negatives include no chest pain, no abdominal pain, no nausea, no vomiting, no headaches, no neck pain, no cough and no difficulty breathing. There have been no prior injuries to these areas. Her tetanus status is out of date. She has been behaving normally. There were no sick contacts.         Past Medical History:   Diagnosis Date    Chlamydia     COVID-19 vaccine series completed 04/20/2021    Pfizer vaccine     Depression 08/16/2011    Managed with meds     Endometriosis     Gonorrhea     Hypertension     Managed with meds     Migraines     PID (acute pelvic inflammatory disease)        Past Surgical History:   Procedure Laterality Date    COLONOSCOPY N/A 6/21/2021    COLONOSCOPY/ 38 performed by Lennie Lion MD at [address]      HX OTHER SURGICAL  2020    Hip surgery:  repaired a tear, reapproximated gluteal muscle    HX PELVIC LAPAROSCOPY  1993    endometriosis         Family History:   Problem Relation Age of Onset    Heart Disease Mother     Hypertension Mother     Diabetes Mother     Breast Cancer Mother 48        chemo and radiation    Hypertension Father     No Known Problems Sister     No Known Problems Sister     No Known Problems Maternal Grandmother     Stomach Cancer Maternal Grandfather     Hypertension Paternal Grandmother     Cancer Paternal Grandfather     Heart Attack Paternal Uncle 79         from a heart attack    Ovarian Cancer Neg Hx     Colon Cancer Neg Hx     Prostate Cancer Neg Hx     Deep Vein Thrombosis Neg Hx     Pulmonary Embolism Neg Hx        Social History     Socioeconomic History    Marital status: SINGLE     Spouse name: Not on file    Number of children: Not on file    Years of education: Not on file    Highest education level: Not on file   Occupational History    Not on file   Tobacco Use    Smoking status: Never Smoker    Smokeless tobacco: Never Used   Vaping Use    Vaping Use: Never used   Substance and Sexual Activity    Alcohol use: No     Alcohol/week: 0.0 standard drinks    Drug use: No    Sexual activity: Not Currently   Other Topics Concern    Not on file   Social History Narrative    1. PCP:  Sienna Cordero NP     Social Determinants of Health     Financial Resource Strain:     Difficulty of Paying Living Expenses: Not on file   Food Insecurity:     Worried About Running Out of Food in the Last Year: Not on file    Yeny of Food in the Last Year: Not on file   Transportation Needs:     Lack of Transportation (Medical): Not on file    Lack of Transportation (Non-Medical):  Not on file   Physical Activity:     Days of Exercise per Week: Not on file    Minutes of Exercise per Session: Not on file   Stress:     Feeling of Stress : Not on file   Social Connections:     Frequency of Communication with Friends and Family: Not on file    Frequency of Social Gatherings with Friends and Family: Not on file    Attends Jewish Services: Not on file    Active Member of Clubs or Organizations: Not on file    Attends Club or Organization Meetings: Not on file    Marital Status: Not on file   Intimate Partner Violence:     Fear of Current or Ex-Partner: Not on file    Emotionally Abused: Not on file    Physically Abused: Not on file    Sexually Abused: Not on file   Housing Stability:  Unable to Pay for Housing in the Last Year: Not on file    Number of Places Lived in the Last Year: Not on file    Unstable Housing in the Last Year: Not on file         ALLERGIES: Latex, natural rubber    Review of Systems   Constitutional: Negative for chills and fever. HENT: Negative for congestion, ear pain and rhinorrhea. Eyes: Negative for photophobia and discharge. Respiratory: Negative for cough and shortness of breath. Cardiovascular: Negative for chest pain and palpitations. Gastrointestinal: Negative for abdominal pain, constipation, diarrhea, nausea and vomiting. Endocrine: Negative for cold intolerance and heat intolerance. Genitourinary: Negative for dysuria and flank pain. Musculoskeletal: Negative for arthralgias, myalgias and neck pain. Skin: Positive for color change and wound. Negative for rash. Allergic/Immunologic: Negative for environmental allergies and food allergies. Neurological: Negative for syncope and headaches. Hematological: Negative for adenopathy. Does not bruise/bleed easily. Psychiatric/Behavioral: Negative for dysphoric mood. The patient is not nervous/anxious. All other systems reviewed and are negative. Vitals:    03/09/22 2127 03/09/22 2138   BP: 124/88    Pulse: 66    Resp: 16    Temp: 98.6 °F (37 °C)    SpO2: 97% 97%   Weight: 90.3 kg (199 lb 1.2 oz)    Height: 5' 6\" (1.676 m)             Physical Exam  Vitals and nursing note reviewed. Constitutional:       General: She is in acute distress. Appearance: Normal appearance. She is well-developed. She is obese. HENT:      Head: Normocephalic and atraumatic. Right Ear: External ear normal.      Left Ear: External ear normal.      Mouth/Throat:      Pharynx: No oropharyngeal exudate. Eyes:      Extraocular Movements: Extraocular movements intact. Conjunctiva/sclera: Conjunctivae normal.      Pupils: Pupils are equal, round, and reactive to light.    Neck: Vascular: No JVD. Cardiovascular:      Rate and Rhythm: Normal rate and regular rhythm. Pulses: Normal pulses. Heart sounds: Normal heart sounds. No murmur heard. No friction rub. No gallop. Pulmonary:      Effort: Pulmonary effort is normal.      Breath sounds: Normal breath sounds. Musculoskeletal:         General: Signs of injury present. No deformity. Normal range of motion. Cervical back: Normal range of motion and neck supple. Legs:    Skin:     General: Skin is warm and dry. Capillary Refill: Capillary refill takes less than 2 seconds. Findings: Lesion present. No rash. Neurological:      General: No focal deficit present. Mental Status: She is alert and oriented to person, place, and time. Cranial Nerves: No cranial nerve deficit. Sensory: No sensory deficit. Gait: Gait normal.   Psychiatric:         Mood and Affect: Mood normal.         Speech: Speech normal.         Behavior: Behavior normal.         Thought Content: Thought content normal.         Judgment: Judgment normal.          MDM  Number of Diagnoses or Management Options  Cat bite of left lower leg, initial encounter: new and requires workup  Diagnosis management comments: Tetanus updated  We will check plain films to rule out foreign body  Start Augmentin       Amount and/or Complexity of Data Reviewed  Tests in the radiology section of CPT®: ordered and reviewed  Tests in the medicine section of CPT®: ordered and reviewed  Decide to obtain previous medical records or to obtain history from someone other than the patient: yes  Review and summarize past medical records: yes  Independent visualization of images, tracings, or specimens: yes    Risk of Complications, Morbidity, and/or Mortality  Presenting problems: moderate  Diagnostic procedures: low  Management options: low  General comments: Elements of this note have been dictated via voice recognition software.   Text and phrases may be limited by the accuracy of the software. The chart has been reviewed, but errors may still be present.       Patient Progress  Patient progress: improved         Procedures

## 2022-03-10 NOTE — ED NOTES
I have reviewed discharge instructions with the patient. The patient verbalized understanding. Patient left ED via Discharge Method: ambulatory to Home with self. Opportunity for questions and clarification provided. Patient given 3 scripts. To continue your aftercare when you leave the hospital, you may receive an automated call from our care team to check in on how you are doing. This is a free service and part of our promise to provide the best care and service to meet your aftercare needs.  If you have questions, or wish to unsubscribe from this service please call [phone]. Thank you for Choosing our [address] [place] [address] Emergency Department.

## 2022-03-10 NOTE — ED TRIAGE NOTES
Pt states that she was bitten on the left lower leg but her neighbor's cat. Swelling and redness to the left lower leg.   Masked on arrival

## 2022-03-18 PROBLEM — I10 ESSENTIAL HYPERTENSION: Status: ACTIVE | Noted: 2020-01-30

## 2022-03-18 PROBLEM — F33.9 RECURRENT DEPRESSION (HCC): Status: ACTIVE | Noted: 2018-01-05

## 2022-03-18 PROBLEM — G43.011 INTRACTABLE MIGRAINE WITHOUT AURA AND WITH STATUS MIGRAINOSUS: Status: ACTIVE | Noted: 2017-05-15

## 2022-03-19 PROBLEM — M54.2 NECK PAIN: Status: ACTIVE | Noted: 2021-04-07

## 2022-03-19 PROBLEM — Z80.3 FAMILY HISTORY OF BREAST CANCER IN MOTHER: Status: ACTIVE | Noted: 2021-04-07

## 2022-03-19 PROBLEM — F51.04 PSYCHOPHYSIOLOGICAL INSOMNIA: Status: ACTIVE | Noted: 2017-05-15

## 2022-03-19 PROBLEM — M62.838 MUSCLE SPASMS OF NECK: Status: ACTIVE | Noted: 2021-04-07

## 2022-03-19 PROBLEM — F41.9 ANXIETY: Status: ACTIVE | Noted: 2020-03-19

## 2022-03-19 PROBLEM — E78.49 FAMILIAL HYPERLIPIDEMIA: Status: ACTIVE | Noted: 2020-09-02

## 2022-03-20 PROBLEM — N95.1 HOT FLASHES DUE TO MENOPAUSE: Status: ACTIVE | Noted: 2021-04-07

## 2022-03-21 ENCOUNTER — TRANSCRIBE ORDER (OUTPATIENT)
Dept: SCHEDULING | Age: 52
End: 2022-03-21

## 2022-03-21 DIAGNOSIS — Z12.31 OTHER SCREENING MAMMOGRAM: Primary | ICD-10-CM

## 2022-11-16 ENCOUNTER — HOSPITAL ENCOUNTER (EMERGENCY)
Age: 52
Discharge: HOME | End: 2022-11-16
Attending: GENERAL PRACTICE
Payer: COMMERCIAL

## 2022-11-16 VITALS
RESPIRATION RATE: 18 BRPM | TEMPERATURE: 97.9 F | DIASTOLIC BLOOD PRESSURE: 71 MMHG | BODY MASS INDEX: 32.78 KG/M2 | SYSTOLIC BLOOD PRESSURE: 145 MMHG | OXYGEN SATURATION: 99 % | HEART RATE: 71 BPM | WEIGHT: 204 LBS | HEIGHT: 66 IN

## 2022-11-16 DIAGNOSIS — M54.17 LUMBOSACRAL RADICULOPATHY: Primary | ICD-10-CM

## 2022-11-16 PROCEDURE — 96372 THER/PROPH/DIAG INJ SC/IM: CPT

## 2022-11-16 PROCEDURE — 6360000002 HC RX W HCPCS: Performed by: GENERAL PRACTICE

## 2022-11-16 PROCEDURE — 99284 EMERGENCY DEPT VISIT MOD MDM: CPT

## 2022-11-16 PROCEDURE — 6370000000 HC RX 637 (ALT 250 FOR IP): Performed by: GENERAL PRACTICE

## 2022-11-16 RX ORDER — DEXAMETHASONE SODIUM PHOSPHATE 10 MG/ML
10 INJECTION INTRAMUSCULAR; INTRAVENOUS ONCE
Status: COMPLETED | OUTPATIENT
Start: 2022-11-16 | End: 2022-11-16

## 2022-11-16 RX ORDER — METHYLPREDNISOLONE 4 MG/1
TABLET ORAL
Qty: 21 TABLET | Refills: 0 | Status: SHIPPED | OUTPATIENT
Start: 2022-11-16 | End: 2022-11-22

## 2022-11-16 RX ORDER — IBUPROFEN 600 MG/1
600 TABLET ORAL 3 TIMES DAILY PRN
Qty: 40 TABLET | Refills: 0 | Status: SHIPPED | OUTPATIENT
Start: 2022-11-16

## 2022-11-16 RX ORDER — HYDROCODONE BITARTRATE AND ACETAMINOPHEN 5; 325 MG/1; MG/1
1 TABLET ORAL
Status: COMPLETED | OUTPATIENT
Start: 2022-11-16 | End: 2022-11-16

## 2022-11-16 RX ORDER — KETOROLAC TROMETHAMINE 30 MG/ML
30 INJECTION, SOLUTION INTRAMUSCULAR; INTRAVENOUS ONCE
Status: COMPLETED | OUTPATIENT
Start: 2022-11-16 | End: 2022-11-16

## 2022-11-16 RX ADMIN — DEXAMETHASONE SODIUM PHOSPHATE 10 MG: 10 INJECTION INTRAMUSCULAR; INTRAVENOUS at 11:39

## 2022-11-16 RX ADMIN — KETOROLAC TROMETHAMINE 30 MG: 30 INJECTION, SOLUTION INTRAMUSCULAR at 11:35

## 2022-11-16 RX ADMIN — HYDROCODONE BITARTRATE AND ACETAMINOPHEN 1 TABLET: 5; 325 TABLET ORAL at 11:37

## 2022-11-16 ASSESSMENT — PAIN - FUNCTIONAL ASSESSMENT
PAIN_FUNCTIONAL_ASSESSMENT: 0-10
PAIN_FUNCTIONAL_ASSESSMENT: 0-10

## 2022-11-16 ASSESSMENT — PAIN SCALES - GENERAL
PAINLEVEL_OUTOF10: 10
PAINLEVEL_OUTOF10: 10
PAINLEVEL_OUTOF10: 4
PAINLEVEL_OUTOF10: 6
PAINLEVEL_OUTOF10: 10

## 2022-11-16 ASSESSMENT — PAIN DESCRIPTION - DESCRIPTORS
DESCRIPTORS: THROBBING;SHOOTING
DESCRIPTORS: SHARP
DESCRIPTORS: SHOOTING;SHARP
DESCRIPTORS: SHARP

## 2022-11-16 ASSESSMENT — PAIN DESCRIPTION - ORIENTATION
ORIENTATION: RIGHT

## 2022-11-16 ASSESSMENT — PAIN DESCRIPTION - LOCATION
LOCATION: HIP
LOCATION: HIP;LEG
LOCATION: HIP
LOCATION: HIP

## 2022-11-16 NOTE — ED NOTES
I have reviewed discharge instructions with the patient. The patient verbalized understanding. Patient left ED via Discharge Method: ambulatory to Home with pushpa. Opportunity for questions and clarification provided. Patient given 2 scripts. To continue your aftercare when you leave the hospital, you may receive an automated call from our care team to check in on how you are doing. This is a free service and part of our promise to provide the best care and service to meet your aftercare needs.  If you have questions, or wish to unsubscribe from this service please call [phone]. Thank you for Choosing our [facility] Emergency Department.           Naren Farris RN  11/16/22 [facility]    Procedure: IR Procedure Note  Date/Time: 11/27/2019 11:28 AM  Performed by: Patti Veras DO  Authorized by: Patti Veras DO   IR Fellow Physician:  Radiology Resident Physician: Dr. Veras.       UNIVERSAL PROTOCOL   Site Marked: Yes  Prior Images Obtained and Reviewed:  Yes  Required items: Required blood products, implants, devices and special equipment available    Patient identity confirmed:  Verbally with patient  Patient was reevaluated immediately before administering moderate or deep sedation or anesthesia  Confirmation Checklist:  Patient's identity using two indicators, relevant allergies, procedure was appropriate and matched the consent or emergent situation and correct equipment/implants were available  Time out: Immediately prior to the procedure a time out was called    Preparation: Patient was prepped and draped in usual sterile fashion    ESBL (mL):  0     ANESTHESIA    Local Anesthetic: Lidocaine 1% without epinephrine      SEDATION    Patient Sedated: No    See dictated procedure note for full details.  Findings: Bilateral pleural effusions and ascites.     Specimens: none    Complications: None    Condition: Stable    Plan: Bilateral thoracentesis and paracentesis.     PROCEDURE   Patient Tolerance:  Patient tolerated the procedure well with no immediate complications    Length of time physician/provider present for 1:1 monitoring during sedation: 0

## 2022-11-16 NOTE — ED TRIAGE NOTES
Patient reports pain in Right buttocks that radiates down Right leg to toes that started Monday 11/14. Pt reports hx Right hip injury and Right hip surgery in 2020.

## 2022-11-16 NOTE — ED PROVIDER NOTES
Emergency Department Provider Note                   PCP:                TRACI Jarrett NP               Age: 46 y.o. Sex: female     No diagnosis found. DISPOSITION          MDM  Number of Diagnoses or Management Options  Lumbosacral radiculopathy: new, needed workup  Diagnosis management comments: Patient presents with likely lumbosacral radiculopathy. There is nothing to suggest any red flags or danger signs at this time. I doubt cauda equina or spinal epidural abscess. Patient is much improved after symptomatic measures here. She will be treated with anti-inflammatories and steroids over the next several days. Patient is to follow-up with primary as she may need an MRI as an outpatient. Return precautions are given. Risk of Complications, Morbidity, and/or Mortality  Presenting problems: moderate  Diagnostic procedures: low  Management options: low    Patient Progress  Patient progress: improved             No orders of the defined types were placed in this encounter. Medications   dexamethasone (DECADRON) injection 10 mg (has no administration in time range)   ketorolac (TORADOL) injection 30 mg (has no administration in time range)   HYDROcodone-acetaminophen (NORCO) 5-325 MG per tablet 1 tablet (has no administration in time range)       New Prescriptions    No medications on file        Obed Schrader is a 46 y.o. female who presents to the Emergency Department with chief complaint of    Chief Complaint   Patient presents with    Hip Pain    Leg Pain      Patient presents with right buttock and leg pain. Pain is a sharp shooting pain that radiates down the buttock and into the posterior and lateral aspect of the right leg down to the foot. Patient does not have any weakness or foot drop. She denies saddle anesthesia and she denies bowel or bladder change. Patient states she has never had a pain like this before. She has had it for 3 days. She denies any inciting event. She denies any obvious exacerbating or alleviating factors but does feel as though she needs to stand a lot but also feels as though walking makes it worse. She denies abdominal pain. Patient denies fevers. She has no other complaints at this time. Review of Systems   All other systems reviewed and are negative. Past Medical History:   Diagnosis Date    Chlamydia     COVID-19 vaccine series completed 2021    Pfizer vaccine     Depression 2011    Managed with meds     Endometriosis     Gonorrhea     Hypertension     Managed with meds     Migraines     PID (acute pelvic inflammatory disease)         Past Surgical History:   Procedure Laterality Date    CARDIAC CATHETERIZATION      COLONOSCOPY N/A 2021    COLONOSCOPY/ 38 performed by Adeline Rubio MD at [address]      Hip surgery:  repaired a tear, reapproximated gluteal muscle    PELVIC LAPAROSCOPY      endometriosis        Family History   Problem Relation Age of Onset    Pulmonary Embolism Neg Hx     Deep Vein Thrombosis Neg Hx     Prostate Cancer Neg Hx     Ovarian Cancer Neg Hx     Colon Cancer Neg Hx     Heart Attack Paternal Uncle 79         from a heart attack    Cancer Paternal Grandfather     Hypertension Paternal Grandmother     Stomach Cancer Maternal Grandfather     No Known Problems Maternal Grandmother     No Known Problems Sister     No Known Problems Sister     Hypertension Father     Breast Cancer Mother 48        chemo and radiation    Diabetes Mother     Hypertension Mother     Heart Disease Mother         Social History     Socioeconomic History    Marital status: Single   Tobacco Use    Smoking status: Never    Smokeless tobacco: Never   Substance and Sexual Activity    Alcohol use: No     Alcohol/week: 0.0 standard drinks    Drug use: No   Social History Narrative    1. PCP:  Ramu Magana NP         Patient has no known allergies.      Previous Medications AMLODIPINE (NORVASC) 5 MG TABLET    TAKE 1 TABLET BY MOUTH EVERY DAY    AMOXICILLIN-CLAVULANATE (AUGMENTIN) 875-125 MG PER TABLET    Take 1 tablet by mouth 2 times daily    BUTALBITAL-APAP-CAFFEINE -40 MG CAPS PER CAPSULE    Take 1 capsule by mouth every 4 hours as needed    ESTRADIOL (ESTRACE) 0.1 MG/GM VAGINAL CREAM    Apply externally and intravaginal qhs x 2weeks then on Sunday and Wednesday. ESTRADIOL (VIVELLE) 0.05 MG/24HR    Change patch on sundays and Wednesday. IBUPROFEN (ADVIL;MOTRIN) 800 MG TABLET    Take 800 mg by mouth every 8 hours as needed    LORAZEPAM (ATIVAN) 1 MG TABLET    Take 1 mg by mouth. NAPROXEN (NAPROSYN) 500 MG TABLET    Take 500 mg by mouth 2 times daily (with meals)    PROGESTERONE (PROMETRIUM) 200 MG CAPS CAPSULE    Take QHS the 1-12 of every month. ROSUVASTATIN (CRESTOR) 10 MG TABLET    Take 10 mg by mouth    SUMATRIPTAN (IMITREX) 100 MG TABLET    TAKE 1 TABLET BY MOUTH ONCE AS NEEDED FOR MIGRAINE FOR UP TO 1 DOSE    TIZANIDINE (ZANAFLEX) 2 MG TABLET    TAKE 1 TABLET BY MOUTH 2 TIMES A DAY AS NEEDED FOR MUSCLE SPAMS    VENLAFAXINE (EFFEXOR XR) 37.5 MG EXTENDED RELEASE CAPSULE    Take 37.5 mg by mouth daily        Vitals signs and nursing note reviewed. Patient Vitals for the past 4 hrs:   Temp Pulse Resp BP SpO2   11/16/22 1031 97.9 °F (36.6 °C) 63 18 (!) 149/68 98 %          Physical Exam  Constitutional:       General: She is not in acute distress. HENT:      Head: Normocephalic and atraumatic. Nose: Nose normal.      Mouth/Throat:      Mouth: Mucous membranes are moist.   Eyes:      Extraocular Movements: Extraocular movements intact. Pupils: Pupils are equal, round, and reactive to light. Cardiovascular:      Rate and Rhythm: Normal rate and regular rhythm. Heart sounds: Normal heart sounds. Pulmonary:      Effort: No respiratory distress. Breath sounds: No wheezing. Abdominal:      General: Abdomen is flat.       Palpations: Abdomen is soft.      Tenderness: There is no abdominal tenderness. Musculoskeletal:         General: No swelling or tenderness. Cervical back: Normal range of motion. Comments: Positive straight leg raise at 120 degrees. No foot drop   Skin:     Findings: No erythema or rash. Neurological:      General: No focal deficit present. Mental Status: She is oriented to person, place, and time. Sensory: No sensory deficit. Motor: No weakness. Psychiatric:         Mood and Affect: Mood normal.         Behavior: Behavior normal.        Procedures    No results found for any visits on 11/16/22. No orders to display                       Voice dictation software was used during the making of this note. This software is not perfect and grammatical and other typographical errors may be present. This note has not been completely proofread for errors.      Marcella Wright, DO  11/16/22 [id number]

## 2023-01-10 ENCOUNTER — APPOINTMENT (OUTPATIENT)
Dept: GENERAL RADIOLOGY | Age: 53
End: 2023-01-10
Payer: COMMERCIAL

## 2023-01-10 ENCOUNTER — HOSPITAL ENCOUNTER (EMERGENCY)
Dept: GENERAL RADIOLOGY | Age: 53
Discharge: HOME | End: 2023-01-13
Payer: COMMERCIAL

## 2023-01-10 ENCOUNTER — HOSPITAL ENCOUNTER (EMERGENCY)
Age: 53
Discharge: HOME | End: 2023-01-11
Attending: EMERGENCY MEDICINE
Payer: COMMERCIAL

## 2023-01-10 ENCOUNTER — HOSPITAL ENCOUNTER (EMERGENCY)
Dept: ULTRASOUND IMAGING | Age: 53
Discharge: HOME | End: 2023-01-13
Payer: COMMERCIAL

## 2023-01-10 VITALS
HEART RATE: 71 BPM | DIASTOLIC BLOOD PRESSURE: 77 MMHG | TEMPERATURE: 98.2 F | SYSTOLIC BLOOD PRESSURE: 174 MMHG | RESPIRATION RATE: 16 BRPM | WEIGHT: 209 LBS | BODY MASS INDEX: 33.73 KG/M2 | OXYGEN SATURATION: 97 %

## 2023-01-10 DIAGNOSIS — M16.11 PRIMARY OSTEOARTHRITIS OF RIGHT HIP: Primary | ICD-10-CM

## 2023-01-10 DIAGNOSIS — M54.31 SCIATICA OF RIGHT SIDE: ICD-10-CM

## 2023-01-10 PROCEDURE — 96374 THER/PROPH/DIAG INJ IV PUSH: CPT | Performed by: PHYSICIAN ASSISTANT

## 2023-01-10 PROCEDURE — 96372 THER/PROPH/DIAG INJ SC/IM: CPT | Performed by: PHYSICIAN ASSISTANT

## 2023-01-10 PROCEDURE — 93971 EXTREMITY STUDY: CPT

## 2023-01-10 PROCEDURE — 6370000000 HC RX 637 (ALT 250 FOR IP): Performed by: PHYSICIAN ASSISTANT

## 2023-01-10 PROCEDURE — 6360000002 HC RX W HCPCS: Performed by: PHYSICIAN ASSISTANT

## 2023-01-10 PROCEDURE — 72100 X-RAY EXAM L-S SPINE 2/3 VWS: CPT

## 2023-01-10 PROCEDURE — 99284 EMERGENCY DEPT VISIT MOD MDM: CPT | Performed by: PHYSICIAN ASSISTANT

## 2023-01-10 PROCEDURE — 73502 X-RAY EXAM HIP UNI 2-3 VIEWS: CPT

## 2023-01-10 RX ORDER — METHYLPREDNISOLONE 4 MG/1
4 TABLET ORAL DAILY
Qty: 6 TABLET | Refills: 0 | Status: SHIPPED | OUTPATIENT
Start: 2023-01-10 | End: 2023-01-16

## 2023-01-10 RX ORDER — LIDOCAINE 4 G/G
2 PATCH TOPICAL
Status: DISCONTINUED | OUTPATIENT
Start: 2023-01-10 | End: 2023-01-11 | Stop reason: HOSPADM

## 2023-01-10 RX ORDER — TRAMADOL HYDROCHLORIDE 50 MG/1
TABLET ORAL
COMMUNITY
Start: 2022-10-28

## 2023-01-10 RX ORDER — CYCLOBENZAPRINE HCL 10 MG
TABLET ORAL
COMMUNITY
Start: 2022-11-21

## 2023-01-10 RX ORDER — KETOROLAC TROMETHAMINE 30 MG/ML
30 INJECTION, SOLUTION INTRAMUSCULAR; INTRAVENOUS ONCE
Status: COMPLETED | OUTPATIENT
Start: 2023-01-10 | End: 2023-01-10

## 2023-01-10 RX ORDER — LIDOCAINE 50 MG/G
1 PATCH TOPICAL DAILY
Qty: 30 PATCH | Refills: 0 | Status: SHIPPED | OUTPATIENT
Start: 2023-01-10 | End: 2023-02-09

## 2023-01-10 RX ORDER — DEXAMETHASONE SODIUM PHOSPHATE 10 MG/ML
10 INJECTION INTRAMUSCULAR; INTRAVENOUS
Status: COMPLETED | OUTPATIENT
Start: 2023-01-10 | End: 2023-01-10

## 2023-01-10 RX ADMIN — KETOROLAC TROMETHAMINE 30 MG: 30 INJECTION, SOLUTION INTRAMUSCULAR at 23:46

## 2023-01-10 RX ADMIN — DEXAMETHASONE SODIUM PHOSPHATE 10 MG: 10 INJECTION, SOLUTION INTRAMUSCULAR; INTRAVENOUS at 23:46

## 2023-01-10 ASSESSMENT — PAIN DESCRIPTION - ORIENTATION: ORIENTATION: RIGHT

## 2023-01-10 ASSESSMENT — ENCOUNTER SYMPTOMS
ABDOMINAL PAIN: 0
BACK PAIN: 1
EYES NEGATIVE: 1
GASTROINTESTINAL NEGATIVE: 1
SHORTNESS OF BREATH: 0
ALLERGIC/IMMUNOLOGIC NEGATIVE: 1
RESPIRATORY NEGATIVE: 1

## 2023-01-10 ASSESSMENT — PAIN SCALES - GENERAL: PAINLEVEL_OUTOF10: 10

## 2023-01-10 ASSESSMENT — PAIN DESCRIPTION - LOCATION: LOCATION: LEG

## 2023-01-10 ASSESSMENT — PAIN - FUNCTIONAL ASSESSMENT: PAIN_FUNCTIONAL_ASSESSMENT: 0-10

## 2023-01-10 NOTE — Clinical Note
Wil Powers was seen and treated in our emergency department on 1/10/2023. She may return to work on 01/13/2023. If you have any questions or concerns, please don't hesitate to call.       CHRISTOPHER Wen

## 2023-01-11 NOTE — ED NOTES
I have reviewed discharge instructions with the patient. The patient verbalized understanding. Patient left ED via Discharge Method: ambulatory to Home with self. Opportunity for questions and clarification provided. Patient given 2 scripts. To continue your aftercare when you leave the hospital, you may receive an automated call from our care team to check in on how you are doing. This is a free service and part of our promise to provide the best care and service to meet your aftercare needs.  If you have questions, or wish to unsubscribe from this service please call [phone]. Thank you for Choosing our [address] [place] [address] Emergency Department.         Remedios Galeas RN  01/11/23 0005

## 2023-01-11 NOTE — ED PROVIDER NOTES
Emergency Department Provider Note                   PCP:                None Provider               Age: 46 y.o. Sex: female       ICD-10-CM    1. Primary osteoarthritis of right hip  M16.11       2. Sciatica of right side  M54.31           DISPOSITION Decision To Discharge 01/10/2023 11:42:38 PM        Medical Decision Making  Patient with right hip arthritis with severe superior joint space narrowing on the right hip x-ray and the lumbar spine film does not show any degenerative changes. She is neurologically intact. She does have a hip specialist she can see. She is not a diabetic so she was given a shot of Decadron and Toradol IM here in the ED. patient was encouraged to use warm moist heat, massage and stretching multiple times a day. Return to the ED if worsening in any way. Follow-up with her orthopedic for recheck. She is neurologically intact and there are no urgent or emergent signs or symptoms at this time to indicate an emergent need for further evaluation here in the ED. Patient presented with acute right lower back/hip pain to the ED. History obtained from patient. I reviewed the following tests XR lumbar spine and right hip, venous duplex right. I have review records from an external source Dr. Sirena Fairchild and Dr. Saravanan Craft. I did engage in shared decision making with the patient/family. There is no social determinant of health affecting care. I did not use a clinical guideline to determine care of the patient. I discussed results/disposition with patient. Problems Addressed:  Primary osteoarthritis of right hip: acute illness or injury  Sciatica of right side: acute illness or injury    Amount and/or Complexity of Data Reviewed  Radiology: ordered. ECG/medicine tests: ordered. Risk  OTC drugs. Prescription drug management.                                 Orders Placed This Encounter   Procedures    XR HIP 2-3 VW W PELVIS RIGHT    XR LUMBAR SPINE (2-3 VIEWS)    Vascular duplex lower extremity venous right        Medications   dexamethasone (DECADRON) injection 10 mg (has no administration in time range)   ketorolac (TORADOL) injection 30 mg (has no administration in time range)   lidocaine 4 % external patch 2 patch (has no administration in time range)       New Prescriptions    LIDOCAINE (LIDODERM) 5 %    Place 1 patch onto the skin daily 12 hours on, 12 hours off. METHYLPREDNISOLONE (MEDROL) 4 MG TABLET    Take 1 tablet by mouth daily for 6 days        John Khan is a 46 y.o. female who presents to the Emergency Department with chief complaint of    Chief Complaint   Patient presents with    Leg Pain      Patient is here with pain to her right low back, hip and right leg. She states is been bothering her a while but has gotten progressively worse. She is a patient of Dr. Francisco Mclaughlin at [facility]. He did surgery on her hip that sounds like a labral repair in 2020. She has not had a fever. She states she was seen here about 2 months ago for the pain and was sent back to Dr. Francisco Mclaughlin. He told her he believes its arthritis in her back. She has had no loss or retention of her urine or bowels. She is using a walker to help her ambulate as she has pain in that leg. She feels like there is swelling in that leg. No chest pain, shortness of breath, abdominal pain, loss or retention of her urine or bowels or other new symptoms. No fever. The history is provided by the patient. Leg Pain  This is a recurrent problem. The current episode started more than 1 week ago. The problem occurs constantly. The problem has been gradually worsening. Pertinent negatives include no chest pain, no abdominal pain, no headaches and no shortness of breath. The symptoms are aggravated by walking. Nothing relieves the symptoms. She has tried nothing for the symptoms. Review of Systems   Constitutional: Negative. Negative for fever. HENT: Negative. Eyes: Negative. Respiratory: Negative. Negative for shortness of breath. Cardiovascular: Negative. Negative for chest pain. Gastrointestinal: Negative. Negative for abdominal pain. Endocrine: Negative. Genitourinary: Negative. Musculoskeletal:  Positive for back pain. Right hip and right leg pain   Skin: Negative. Allergic/Immunologic: Negative. Neurological: Negative. Negative for headaches. Hematological: Negative. Psychiatric/Behavioral: Negative. All other systems reviewed and are negative. Past Medical History:   Diagnosis Date    Chlamydia     COVID-19 vaccine series completed 2021    Pfizer vaccine     Depression 2011    Managed with meds     Endometriosis     Gonorrhea     Hypertension     Managed with meds     Migraines     PID (acute pelvic inflammatory disease)         Past Surgical History:   Procedure Laterality Date    CARDIAC CATHETERIZATION      COLONOSCOPY N/A 2021    COLONOSCOPY/ 38 performed by Andreas Garvey MD at [address]      Hip surgery:  repaired a tear, reapproximated gluteal muscle    PELVIC LAPAROSCOPY      endometriosis        Family History   Problem Relation Age of Onset    Pulmonary Embolism Neg Hx     Deep Vein Thrombosis Neg Hx     Prostate Cancer Neg Hx     Ovarian Cancer Neg Hx     Colon Cancer Neg Hx     Heart Attack Paternal Uncle 79         from a heart attack    Cancer Paternal Grandfather     Hypertension Paternal Grandmother     Stomach Cancer Maternal Grandfather     No Known Problems Maternal Grandmother     No Known Problems Sister     No Known Problems Sister     Hypertension Father     Breast Cancer Mother 48        chemo and radiation    Diabetes Mother     Hypertension Mother     Heart Disease Mother         Social History     Socioeconomic History    Marital status: Single   Tobacco Use    Smoking status: Never    Smokeless tobacco: Never   Substance and Sexual Activity    Alcohol use:  No Alcohol/week: 0.0 standard drinks    Drug use: No   Social History Narrative    1. PCP:  Marcos Franco NP         Patient has no known allergies. Previous Medications    AMLODIPINE (NORVASC) 5 MG TABLET    TAKE 1 TABLET BY MOUTH EVERY DAY    AMOXICILLIN-CLAVULANATE (AUGMENTIN) 875-125 MG PER TABLET    Take 1 tablet by mouth 2 times daily    BUTALBITAL-APAP-CAFFEINE -40 MG CAPS PER CAPSULE    Take 1 capsule by mouth every 4 hours as needed    CYCLOBENZAPRINE (FLEXERIL) 10 MG TABLET    TAKE 1 TABLET (10 MG) BY MOUTH 3 (THREE) TIMES A DAY AS NEEDED FOR MUSCLE SPASMS    ESTRADIOL (ESTRACE) 0.1 MG/GM VAGINAL CREAM    Apply externally and intravaginal qhs x 2weeks then on Sunday and Wednesday. ESTRADIOL (VIVELLE) 0.05 MG/24HR    Change patch on sundays and Wednesday. IBUPROFEN (ADVIL;MOTRIN) 600 MG TABLET    Take 1 tablet by mouth 3 times daily as needed for Pain    IBUPROFEN (ADVIL;MOTRIN) 800 MG TABLET    Take 800 mg by mouth every 8 hours as needed    LORAZEPAM (ATIVAN) 1 MG TABLET    Take 1 mg by mouth. NAPROXEN (NAPROSYN) 500 MG TABLET    Take 500 mg by mouth 2 times daily (with meals)    PROGESTERONE (PROMETRIUM) 200 MG CAPS CAPSULE    Take QHS the 1-12 of every month. ROSUVASTATIN (CRESTOR) 10 MG TABLET    Take 10 mg by mouth    SUMATRIPTAN (IMITREX) 100 MG TABLET    TAKE 1 TABLET BY MOUTH ONCE AS NEEDED FOR MIGRAINE FOR UP TO 1 DOSE    TIZANIDINE (ZANAFLEX) 2 MG TABLET    TAKE 1 TABLET BY MOUTH 2 TIMES A DAY AS NEEDED FOR MUSCLE SPAMS    TRAMADOL (ULTRAM) 50 MG TABLET    TAKE 1 TABLET (50 MG) BY MOUTH EVERY 8 (EIGHT) HOURS AS NEEDED FOR SEVERE PAIN    VENLAFAXINE (EFFEXOR XR) 37.5 MG EXTENDED RELEASE CAPSULE    Take 37.5 mg by mouth daily        Vitals signs and nursing note reviewed. Patient Vitals for the past 4 hrs:   Temp Pulse Resp BP SpO2   01/10/23 2109 98.2 °F (36.8 °C) 71 16 (!) 174/77 97 %          Physical Exam  Vitals and nursing note reviewed.    Constitutional:       Appearance: Normal appearance. HENT:      Head: Normocephalic and atraumatic. Right Ear: External ear normal.      Left Ear: External ear normal.      Nose: Nose normal.      Mouth/Throat:      Mouth: Mucous membranes are moist.   Eyes:      Extraocular Movements: Extraocular movements intact. Conjunctiva/sclera: Conjunctivae normal.      Pupils: Pupils are equal, round, and reactive to light. Cardiovascular:      Rate and Rhythm: Normal rate. Pulses: Normal pulses. Pulmonary:      Effort: Pulmonary effort is normal.   Abdominal:      General: Abdomen is flat. Palpations: Abdomen is soft. Musculoskeletal:         General: Tenderness present. No swelling, deformity or signs of injury. Normal range of motion. Cervical back: Normal range of motion. Right lower leg: Edema present. Left lower leg: No edema. Legs:       Comments: Tenderness palpation of the right paralumbar, gluteal and right hip areas, palpation of those areas reproduces her pain. She has mild limited range of motion due to pain but is distally neurovascularly intact. There is a positive Homans' sign. She is distally neurovascularly intact. Skin:     General: Skin is warm. Capillary Refill: Capillary refill takes less than 2 seconds. Neurological:      General: No focal deficit present. Mental Status: She is alert and oriented to person, place, and time. Mental status is at baseline. GCS: GCS eye subscore is 4. GCS verbal subscore is 5. GCS motor subscore is 6. Cranial Nerves: Cranial nerves 2-12 are intact. Sensory: Sensation is intact. Motor: Motor function is intact. Coordination: Coordination is intact. Gait: Gait is intact. Psychiatric:         Mood and Affect: Mood normal.         Behavior: Behavior normal.         Thought Content:  Thought content normal.         Judgment: Judgment normal.        Procedures    Results for orders placed or performed during the hospital encounter of 01/10/23   XR HIP 2-3 VW W PELVIS RIGHT    Narrative    Right Hip    INDICATION: Chronic right hip pain. Worsening    COMPARISON: October 2017    TECHNIQUE: Three views of the right hip were obtained    FINDINGS:    There is severe superior right hip joint space narrowing, with near bone-on-bone  contact. No acute fracture or dislocation. Visualized left hip is unremarkable. There is no acute pelvic fracture. The SI  joints and pubic symphysis are intact. Impression    1. Right hip joint osteoarthritis, with severe superior joint space narrowing. 2. No acute fracture or dislocation. XR LUMBAR SPINE (2-3 VIEWS)    Narrative    Clinical history: Low back pain. Right leg pain    TECHNIQUE: AP, lateral and coned-down lateral views of the lumbar spine. COMPARISON: October 2019. FINDINGS:    Alignment of the lumbar spine and vertebral body heights are maintained. There  is no acute fracture or dislocation. Intervertebral disc spaces are preserved. Impression    1. Negative lumbar spine x-ray. XR LUMBAR SPINE (2-3 VIEWS)   Final Result      1. Negative lumbar spine x-ray. XR HIP 2-3 VW W PELVIS RIGHT   Final Result      1. Right hip joint osteoarthritis, with severe superior joint space narrowing. 2. No acute fracture or dislocation. Vascular duplex lower extremity venous right    (Results Pending)                       Voice dictation software was used during the making of this note. This software is not perfect and grammatical and other typographical errors may be present. This note has not been completely proofread for errors.      CHRISTOPHER Pillai  01/10/23 9304

## 2023-01-11 NOTE — DISCHARGE INSTRUCTIONS
Use warm, moist heat to area, massage, gentle range of motion and stretching to area. Use the medication as directed, ED if worse. Follow up with Dr. Mayuri Burgos for recheck.

## 2023-01-11 NOTE — ED TRIAGE NOTES
Pt. A/ox4 and ambulatory to triage with walker. Pt. C/o chronic right hip and  leg pain worsening over the last several days. Pt. W/hx of arthritis and hip surgery.

## 2023-01-17 ENCOUNTER — OFFICE VISIT (OUTPATIENT)
Dept: FAMILY MEDICINE CLINIC | Facility: CLINIC | Age: 53
End: 2023-01-17
Payer: COMMERCIAL

## 2023-01-17 ENCOUNTER — TELEPHONE (OUTPATIENT)
Dept: FAMILY MEDICINE CLINIC | Facility: CLINIC | Age: 53
End: 2023-01-17

## 2023-01-17 ENCOUNTER — HOSPITAL ENCOUNTER (OUTPATIENT)
Dept: GENERAL RADIOLOGY | Age: 53
Discharge: HOME | End: 2023-01-20
Payer: COMMERCIAL

## 2023-01-17 VITALS
BODY MASS INDEX: 33.11 KG/M2 | DIASTOLIC BLOOD PRESSURE: 83 MMHG | RESPIRATION RATE: 16 BRPM | WEIGHT: 206 LBS | HEIGHT: 66 IN | TEMPERATURE: 98 F | SYSTOLIC BLOOD PRESSURE: 142 MMHG | HEART RATE: 60 BPM | OXYGEN SATURATION: 97 %

## 2023-01-17 DIAGNOSIS — Z01.818 PRE-OPERATIVE CLEARANCE: Primary | ICD-10-CM

## 2023-01-17 DIAGNOSIS — Z01.818 PRE-OPERATIVE CLEARANCE: ICD-10-CM

## 2023-01-17 LAB
APTT PPP: 37.2 SEC (ref 24.5–34.2)
BACTERIA URINE, POC: NEGATIVE
BASOPHILS # BLD: 0.1 K/UL (ref 0–0.2)
BASOPHILS NFR BLD: 1 % (ref 0–2)
BILIRUBIN, URINE, POC: NEGATIVE
BLOOD URINE, POC: NEGATIVE
CASTS URINE, POC: ABNORMAL
DIFFERENTIAL METHOD BLD: ABNORMAL
EOSINOPHIL # BLD: 0.1 K/UL (ref 0–0.8)
EOSINOPHIL NFR BLD: 1 % (ref 0.5–7.8)
EPI CELLS URINE, POC: ABNORMAL
ERYTHROCYTE [DISTWIDTH] IN BLOOD BY AUTOMATED COUNT: 14.3 % (ref 11.9–14.6)
GLUCOSE URINE, POC: NEGATIVE
HCT VFR BLD AUTO: 50.7 % (ref 35.8–46.3)
HGB BLD-MCNC: 16.5 G/DL (ref 11.7–15.4)
IMM GRANULOCYTES # BLD AUTO: 0.1 K/UL (ref 0–0.5)
IMM GRANULOCYTES NFR BLD AUTO: 1 % (ref 0–5)
INR PPP: 1
KETONES, URINE, POC: NEGATIVE
LEUKOCYTE ESTERASE, URINE, POC: ABNORMAL
LYMPHOCYTES # BLD: 2 K/UL (ref 0.5–4.6)
LYMPHOCYTES NFR BLD: 26 % (ref 13–44)
MCH RBC QN AUTO: 28.1 PG (ref 26.1–32.9)
MCHC RBC AUTO-ENTMCNC: 32.5 G/DL (ref 31.4–35)
MCV RBC AUTO: 86.4 FL (ref 82–102)
MONOCYTES # BLD: 0.4 K/UL (ref 0.1–1.3)
MONOCYTES NFR BLD: 5 % (ref 4–12)
NEUTS SEG # BLD: 5.1 K/UL (ref 1.7–8.2)
NEUTS SEG NFR BLD: 66 % (ref 43–78)
NITRITE, URINE, POC: NEGATIVE
NRBC # BLD: 0 K/UL (ref 0–0.2)
PH, URINE, POC: 5 (ref 4.6–8)
PLATELET # BLD AUTO: 279 K/UL (ref 150–450)
PMV BLD AUTO: 10.2 FL (ref 9.4–12.3)
PROTEIN,URINE, POC: NEGATIVE
PROTHROMBIN TIME: 13.4 SEC (ref 12.6–14.3)
RBC # BLD AUTO: 5.87 M/UL (ref 4.05–5.2)
RBC, URINE, POC: ABNORMAL
SPECIFIC GRAVITY, URINE, POC: 1.01 (ref 1–1.03)
TRICHOMONAS URINE, POC: ABNORMAL
URINALYSIS CLARITY, POC: CLEAR
URINALYSIS COLOR, POC: YELLOW
UROBILINOGEN, POC: ABNORMAL
WBC # BLD AUTO: 7.7 K/UL (ref 4.3–11.1)
WBC, URINE, POC: ABNORMAL
YEAST, URINE, POC: ABNORMAL

## 2023-01-17 PROCEDURE — 3077F SYST BP >= 140 MM HG: CPT

## 2023-01-17 PROCEDURE — 99215 OFFICE O/P EST HI 40 MIN: CPT

## 2023-01-17 PROCEDURE — 71046 X-RAY EXAM CHEST 2 VIEWS: CPT

## 2023-01-17 PROCEDURE — 81000 URINALYSIS NONAUTO W/SCOPE: CPT

## 2023-01-17 PROCEDURE — 93000 ELECTROCARDIOGRAM COMPLETE: CPT

## 2023-01-17 PROCEDURE — 3079F DIAST BP 80-89 MM HG: CPT

## 2023-01-17 ASSESSMENT — ENCOUNTER SYMPTOMS
GASTROINTESTINAL NEGATIVE: 1
EYES NEGATIVE: 1
ALLERGIC/IMMUNOLOGIC NEGATIVE: 1
RESPIRATORY NEGATIVE: 1

## 2023-01-17 ASSESSMENT — PATIENT HEALTH QUESTIONNAIRE - PHQ9
2. FEELING DOWN, DEPRESSED OR HOPELESS: 0
1. LITTLE INTEREST OR PLEASURE IN DOING THINGS: 0
SUM OF ALL RESPONSES TO PHQ9 QUESTIONS 1 & 2: 0
SUM OF ALL RESPONSES TO PHQ QUESTIONS 1-9: 0

## 2023-01-17 NOTE — TELEPHONE ENCOUNTER
[facility]    Fax EKG, x ray ,labs and progress note    Fax number---[phone].  Phone number [phone]      provider needs to write a letter stating that she is cleared for right hip surgery on 1/25/23

## 2023-01-17 NOTE — TELEPHONE ENCOUNTER
Results given to pt. [facility]     Fax EKG, x ray ,labs and progress note. The provider needs to write a letter stating that she is cleared for right hip surgery on 1/25/23     Fax number---[phone].  Phone number [phone]        provider needs to write a letter stating that she is cleared for right hip surgery on 1/25/23

## 2023-01-17 NOTE — PROGRESS NOTES
[employer]  [employer]  [employer]  .... Subjective:     Nicole Lucero 1970 is a 46 y.o. female New patient, here for evaluation of the following:   Chief Complaint   Patient presents with    Other     Pre-op apt surgery on 23 on her right hip       Mrs. Perla Fernandez is here for pre-operative clearance for Right Total Hip replacement at [facility] on 2023.     Other      Past Medical History:   Diagnosis Date    Chlamydia     COVID-19 vaccine series completed 2021    Pfizer vaccine     Depression 2011    Managed with meds     Endometriosis     Gonorrhea     Hypertension     Managed with meds     Migraines     PID (acute pelvic inflammatory disease)      Past Surgical History:   Procedure Laterality Date    CARDIAC CATHETERIZATION      COLONOSCOPY N/A 2021    COLONOSCOPY/ 38 performed by David Samson MD at [address]      Hip surgery:  repaired a tear, reapproximated gluteal muscle    PELVIC LAPAROSCOPY      endometriosis      Family History   Problem Relation Age of Onset    Pulmonary Embolism Neg Hx     Deep Vein Thrombosis Neg Hx     Prostate Cancer Neg Hx     Ovarian Cancer Neg Hx     Colon Cancer Neg Hx     Heart Attack Paternal Uncle 79         from a heart attack    Cancer Paternal Grandfather     Hypertension Paternal Grandmother     Stomach Cancer Maternal Grandfather     No Known Problems Maternal Grandmother     No Known Problems Sister     No Known Problems Sister     Hypertension Father     Breast Cancer Mother 48        chemo and radiation    Diabetes Mother     Hypertension Mother     Heart Disease Mother      Social History     Tobacco Use    Smoking status: Never    Smokeless tobacco: Never   Substance Use Topics    Alcohol use: No     Alcohol/week: 0.0 standard drinks      Current Outpatient Medications   Medication Sig Dispense Refill    cyclobenzaprine (FLEXERIL) 10 MG tablet TAKE 1 TABLET (10 MG) BY MOUTH 3 (THREE) TIMES A DAY AS NEEDED FOR MUSCLE SPASMS      traMADol (ULTRAM) 50 MG tablet TAKE 1 TABLET (50 MG) BY MOUTH EVERY 8 (EIGHT) HOURS AS NEEDED FOR SEVERE PAIN      lidocaine (LIDODERM) 5 % Place 1 patch onto the skin daily 12 hours on, 12 hours off. 30 patch 0    amLODIPine (NORVASC) 5 MG tablet TAKE 1 TABLET BY MOUTH EVERY DAY      estradiol (VIVELLE) 0.05 MG/24HR Change patch on sundays and Wednesday. ibuprofen (ADVIL;MOTRIN) 800 MG tablet Take 800 mg by mouth every 8 hours as needed      SUMAtriptan (IMITREX) 100 MG tablet TAKE 1 TABLET BY MOUTH ONCE AS NEEDED FOR MIGRAINE FOR UP TO 1 DOSE      tiZANidine (ZANAFLEX) 2 MG tablet TAKE 1 TABLET BY MOUTH 2 TIMES A DAY AS NEEDED FOR MUSCLE SPAMS       No current facility-administered medications for this visit. No Known Allergies     Reviewed and updated this visit by provider:  Tobacco  Allergies  Meds  Problems  Med Hx  Surg Hx  Fam Hx            Review of Systems   Constitutional:  Positive for activity change. Activity change because of right hip pain. HENT: Negative. Eyes: Negative. Respiratory: Negative. Cardiovascular: Negative. Gastrointestinal: Negative. Endocrine: Negative. Genitourinary: Negative. Musculoskeletal: Negative. Skin: Negative. Allergic/Immunologic: Negative. Neurological: Negative. Hematological: Negative. Psychiatric/Behavioral: Negative. Objective:     Vitals:    01/17/23 1011 01/17/23 1053   BP: (!) 140/98 (!) 142/83   Pulse: 60    Resp: 16    Temp: 98 °F (36.7 °C)    SpO2: 97%    Weight: 206 lb (93.4 kg)    Height: 5' 6\" (1.676 m)         Physical Exam  Constitutional:       Appearance: Normal appearance. HENT:      Head: Normocephalic and atraumatic. Eyes:      Conjunctiva/sclera: Conjunctivae normal.   Cardiovascular:      Rate and Rhythm: Normal rate and regular rhythm. Pulses: Normal pulses. Heart sounds: Normal heart sounds.    Pulmonary:      Effort: Pulmonary effort is normal.      Breath sounds: Normal breath sounds. Abdominal:      General: Abdomen is flat. Bowel sounds are normal.      Palpations: Abdomen is soft. Skin:     General: Skin is warm and dry. Findings: Wound present. Comments: Pavement burn on forearm on right arm   Neurological:      Mental Status: She is alert and oriented to person, place, and time. Psychiatric:         Mood and Affect: Mood normal.         Behavior: Behavior normal.         Assessment & Plan:   Mackenzie Phelps was seen today for other. Diagnoses and all orders for this visit:    Pre-operative clearance  -     CBC with Auto Differential; Future  -     Basic Metabolic Panel; Future  -     APTT; Future  -     Protime-INR; Future  -     Cancel: Urinalysis, Micro; Future  -     EKG 12 Lead; Future  -     XR CHEST (2 VW); Future  -     MSSA/MRSA Screen BY PCR; Future  -     EKG 12 Lead  -     Protime-INR  -     APTT  -     Basic Metabolic Panel  -     CBC with Auto Differential  -     AMB POC URINALYSIS DIP STICK MANUAL W/ MICRO [facility]     EKG Report screening Results and Plan Note:  Rate noted to be 62  EKG: normal sinus rhythm, Nonspecific T abnormality, Mrs. Vikas Pal had a EKG performed on 8/19/2021 and did reveal: Nonspecific ST and T wave changes inferiorly. Plan:  reviewed results with patient and recommend the following: EKG was performed for pre-operative clearance, no follow up necessary at this time. Ekg completed in office and reviewed by TRACI Johnson NP  01/17/23 1:06 PM   No results found for any visits on 01/17/23. Told Mrs. Herrera to continue taking BP meds as prescribed and to come back in about 3 month after surgery to have yearly PE completed, she agreed and voiced understanding. Return in about 3 months (around 4/17/2023).     On this date 1/17/2023 I have spent 40 minutes reviewing previous notes, test results and face to face with the patient discussing the diagnosis and importance of compliance with the treatment plan as well as documenting on the day of the visit.     Signed By: TRACI Rodriguez - CELESTE     January 17, 2023

## 2023-01-18 ENCOUNTER — TELEPHONE (OUTPATIENT)
Dept: FAMILY MEDICINE CLINIC | Facility: CLINIC | Age: 53
End: 2023-01-18

## 2023-01-18 LAB
ANION GAP SERPL CALC-SCNC: 8 MMOL/L (ref 2–11)
BUN SERPL-MCNC: 18 MG/DL (ref 6–23)
CALCIUM SERPL-MCNC: 10.4 MG/DL (ref 8.3–10.4)
CHLORIDE SERPL-SCNC: 110 MMOL/L (ref 101–110)
CO2 SERPL-SCNC: 22 MMOL/L (ref 21–32)
CREAT SERPL-MCNC: 1 MG/DL (ref 0.6–1)
GLUCOSE SERPL-MCNC: 87 MG/DL (ref 65–100)
POTASSIUM SERPL-SCNC: 4.5 MMOL/L (ref 3.5–5.1)
SODIUM SERPL-SCNC: 140 MMOL/L (ref 133–143)

## 2023-01-18 NOTE — TELEPHONE ENCOUNTER
I didn't call her. I sent the lab results over again and she found them in her my chart.   Dayna Rocha NP is going to call her now and go over all of the lab results with her

## 2023-01-18 NOTE — TELEPHONE ENCOUNTER
----- Message from Pipe Cardenas sent at 1/18/2023  2:05 PM EST -----  Subject: Message to Provider    QUESTIONS  Information for Provider? pt would like for her nurse to give her a call   back. ---------------------------------------------------------------------------  --------------  Mamie Thompson VTPX  [phone]; OK to leave message on voicemail  ---------------------------------------------------------------------------  --------------  SCRIPT ANSWERS  Relationship to Patient?  Self

## 2023-01-18 NOTE — TELEPHONE ENCOUNTER
I sent the lab results over again and she found them in her my chart.   Duran Ortiz NP is going to call her now and go over all of the lab results with her

## 2023-01-18 NOTE — TELEPHONE ENCOUNTER
Basic Metabolic Profile: looks good. CBC: red blood cell count, hemoglobin, and hematocrit are a little elevated, I would like to recheck them at your 3 month follow and possibly send you for a sleep study. The rest of your CBC looks good. PTT is also elevated and  would also like to recheck it. Pro-time and INR: normal.     There can be several causes for the elevated lab results, it could be hydration status or sleep apnea.

## 2023-01-18 NOTE — TELEPHONE ENCOUNTER
Spoke to Mrs. Herrera about results told her that in regards to her elevated RBC, HGB, and hematocrit I will want to send her to have a sleep study. I told her that once the MRSA test results I will send the paperwork to her surgeon.

## 2023-01-19 ENCOUNTER — TELEPHONE (OUTPATIENT)
Dept: FAMILY MEDICINE CLINIC | Facility: CLINIC | Age: 53
End: 2023-01-19

## 2023-01-19 DIAGNOSIS — D75.1 POLYCYTHEMIA: ICD-10-CM

## 2023-01-19 DIAGNOSIS — R06.83 SNORING: Primary | ICD-10-CM

## 2023-01-19 NOTE — TELEPHONE ENCOUNTER
I didn't see that one of the tests was a nasal swab.  If you want to have it done I can put you on the nurse schedule

## 2023-01-20 ENCOUNTER — NURSE ONLY (OUTPATIENT)
Dept: FAMILY MEDICINE CLINIC | Facility: CLINIC | Age: 53
End: 2023-01-20

## 2023-01-20 DIAGNOSIS — Z01.818 PRE-OPERATIVE CLEARANCE: Primary | ICD-10-CM

## 2023-01-20 DIAGNOSIS — Z01.818 PRE-OPERATIVE CLEARANCE: ICD-10-CM

## 2023-01-21 LAB
BACTERIA SPEC CULT: NORMAL
SERVICE CMNT-IMP: NORMAL

## 2023-01-22 ENCOUNTER — TELEPHONE (OUTPATIENT)
Dept: FAMILY MEDICINE CLINIC | Facility: CLINIC | Age: 53
End: 2023-01-22

## 2023-01-23 NOTE — TELEPHONE ENCOUNTER
Message sent over the portal and I left a voice message telling her that I have some test results for her

## 2023-03-05 ENCOUNTER — HOSPITAL ENCOUNTER (OUTPATIENT)
Dept: SLEEP CENTER | Age: 53
Discharge: HOME | End: 2023-03-08
Payer: COMMERCIAL

## 2023-03-05 PROCEDURE — 95810 POLYSOM 6/> YRS 4/> PARAM: CPT

## 2023-03-20 NOTE — ED PROVIDER NOTES
44-year-old female complaining of chest pain onset of pain was yesterday. Pain is not let up since yesterday. Patient states it feels like someone punched her in the chest.  Chest pain started yesterday associated with shortness of breath and a cough. No fever. The history is provided by the patient. Chest Pain (Angina)   This is a new problem. The current episode started yesterday. The problem has not changed since onset. The problem occurs constantly. The pain is associated with normal activity. The pain is present in the substernal region. The pain is at a severity of 9/10. The pain is moderate. The quality of the pain is described as dull. The pain does not radiate. The symptoms are aggravated by movement. Pertinent negatives include no diaphoresis, no dizziness, no fever, no malaise/fatigue, no nausea, no near-syncope, no shortness of breath, no vomiting and no weakness.         Past Medical History:   Diagnosis Date    Chlamydia     COVID-19 vaccine series completed 04/20/2021    Pfizer vaccine     Depression 08/16/2011    Managed with meds     Endometriosis     Gonorrhea     H/O echocardiogram     Herpes     Hypertension     Managed with meds     Migraines     PID (acute pelvic inflammatory disease)     Puberty     menarche 15 yo       Past Surgical History:   Procedure Laterality Date    COLONOSCOPY N/A 6/21/2021    COLONOSCOPY/ 38 performed by Kristian Kuhn MD at [address]      HX OTHER SURGICAL  2020    Hip surgery:  repaired a tear, reapproximated gluteal muscle    HX PELVIC LAPAROSCOPY  1993    endometriosis         Family History:   Problem Relation Age of Onset    Heart Disease Mother     Hypertension Mother     Diabetes Mother     Breast Cancer Mother 48        chemo and radiation    Hypertension Father     No Known Problems Sister     No Known Problems Sister     No Known Problems Maternal Grandmother     Stomach Cancer Maternal Grandfather     Hypertension Paternal Grandmother     Cancer Paternal Grandfather     Ovarian Cancer Neg Hx     Colon Cancer Neg Hx     Prostate Cancer Neg Hx     Deep Vein Thrombosis Neg Hx     Pulmonary Embolism Neg Hx        Social History     Socioeconomic History    Marital status: SINGLE     Spouse name: Not on file    Number of children: Not on file    Years of education: Not on file    Highest education level: Not on file   Occupational History    Not on file   Tobacco Use    Smoking status: Never Smoker    Smokeless tobacco: Never Used   Vaping Use    Vaping Use: Never used   Substance and Sexual Activity    Alcohol use: No     Alcohol/week: 0.0 standard drinks    Drug use: No    Sexual activity: Not Currently   Other Topics Concern    Not on file   Social History Narrative    1. PCP:  Martin Dinero NP     Social Determinants of Health     Financial Resource Strain:     Difficulty of Paying Living Expenses:    Food Insecurity:     Worried About [employer] in the Last Year:     [address] in the Last Year:    Transportation Needs:     Lack of Transportation (Medical):  Lack of Transportation (Non-Medical):    Physical Activity:     Days of Exercise per Week:     Minutes of Exercise per Session:    Stress:     Feeling of Stress :    Social Connections:     Frequency of Communication with Friends and Family:     Frequency of Social Gatherings with Friends and Family:     Attends Sikhism Services:     Active Member of Clubs or Organizations:     Attends Club or Organization Meetings:     Marital Status:    Intimate Partner Violence:     Fear of Current or Ex-Partner:     Emotionally Abused:     Physically Abused:     Sexually Abused: ALLERGIES: Latex, natural rubber    Review of Systems   Constitutional: Negative. Negative for activity change, diaphoresis, fever and malaise/fatigue. HENT: Negative. Eyes: Negative.     Respiratory: Negative. Negative for shortness of breath. Cardiovascular: Positive for chest pain. Negative for near-syncope. Gastrointestinal: Negative. Negative for nausea and vomiting. Genitourinary: Negative. Musculoskeletal: Negative. Skin: Negative. Neurological: Negative. Negative for dizziness and weakness. Psychiatric/Behavioral: Negative. All other systems reviewed and are negative. Vitals:    08/12/21 2229   BP: 130/74   Pulse: 66   Resp: 20   Temp: 98.5 °F (36.9 °C)   SpO2: 97%   Weight: 95.3 kg (210 lb)   Height: 5' 6\" (1.676 m)            Physical Exam  Vitals and nursing note reviewed. Constitutional:       General: She is not in acute distress. Appearance: She is well-developed. HENT:      Head: Normocephalic and atraumatic. Right Ear: External ear normal.      Left Ear: External ear normal.      Nose: Nose normal.   Eyes:      General: No scleral icterus. Right eye: No discharge. Left eye: No discharge. Conjunctiva/sclera: Conjunctivae normal.      Pupils: Pupils are equal, round, and reactive to light. Cardiovascular:      Rate and Rhythm: Regular rhythm. Pulmonary:      Effort: Pulmonary effort is normal. No respiratory distress. Breath sounds: Normal breath sounds. No stridor. No wheezing or rales. Abdominal:      General: Bowel sounds are normal. There is no distension. Palpations: Abdomen is soft. Tenderness: There is no abdominal tenderness. Musculoskeletal:         General: Normal range of motion. Cervical back: Normal range of motion. Skin:     General: Skin is warm and dry. Findings: No rash. Neurological:      Mental Status: She is alert and oriented to person, place, and time. Motor: No abnormal muscle tone.       Coordination: Coordination normal.   Psychiatric:         Behavior: Behavior normal.          MDM  Number of Diagnoses or Management Options  Atypical chest pain  Cough  Diagnosis management comments: Differential diagnosis includes AMI, PE, pneumothorax, pneumonia, esophagitis, gastritis,    Patient's laboratory data was normal patient physical exam was also normal laboratory data reassuring she is not tachypneic tachycardic or hypoxic.   We will send her home with PPI follow-up closely PCP       Amount and/or Complexity of Data Reviewed  Clinical lab tests: ordered and reviewed  Tests in the radiology section of CPT®: ordered and reviewed  Tests in the medicine section of CPT®: ordered and reviewed  Decide to obtain previous medical records or to obtain history from someone other than the patient: yes  Independent visualization of images, tracings, or specimens: yes    Risk of Complications, Morbidity, and/or Mortality  Presenting problems: high  Diagnostic procedures: high  Management options: high           Procedures English

## 2023-03-22 ENCOUNTER — TELEPHONE (OUTPATIENT)
Dept: SLEEP MEDICINE | Age: 53
End: 2023-03-22

## 2023-04-21 ENCOUNTER — OFFICE VISIT (OUTPATIENT)
Dept: FAMILY MEDICINE CLINIC | Facility: CLINIC | Age: 53
End: 2023-04-21
Payer: COMMERCIAL

## 2023-04-21 ENCOUNTER — TELEPHONE (OUTPATIENT)
Dept: FAMILY MEDICINE CLINIC | Facility: CLINIC | Age: 53
End: 2023-04-21

## 2023-04-21 VITALS
TEMPERATURE: 98 F | SYSTOLIC BLOOD PRESSURE: 118 MMHG | RESPIRATION RATE: 16 BRPM | DIASTOLIC BLOOD PRESSURE: 78 MMHG | BODY MASS INDEX: 33.27 KG/M2 | OXYGEN SATURATION: 97 % | HEIGHT: 66 IN | HEART RATE: 67 BPM | WEIGHT: 207 LBS

## 2023-04-21 DIAGNOSIS — E78.49 FAMILIAL HYPERLIPIDEMIA: ICD-10-CM

## 2023-04-21 DIAGNOSIS — I10 ESSENTIAL HYPERTENSION: Primary | ICD-10-CM

## 2023-04-21 PROBLEM — G43.011 INTRACTABLE MIGRAINE WITHOUT AURA AND WITH STATUS MIGRAINOSUS: Status: ACTIVE | Noted: 2017-05-15

## 2023-04-21 PROBLEM — M62.838 MUSCLE SPASMS OF NECK: Status: ACTIVE | Noted: 2021-04-07

## 2023-04-21 PROBLEM — M94.251 CHONDROMALACIA OF RIGHT HIP: Status: ACTIVE | Noted: 2022-12-27

## 2023-04-21 PROBLEM — F33.9 RECURRENT DEPRESSION (HCC): Status: ACTIVE | Noted: 2018-01-05

## 2023-04-21 PROBLEM — E66.3 OVERWEIGHT: Status: ACTIVE | Noted: 2022-05-24

## 2023-04-21 PROBLEM — M25.551 PAIN OF RIGHT HIP JOINT: Status: ACTIVE | Noted: 2023-03-03

## 2023-04-21 PROBLEM — F41.9 ANXIETY: Status: ACTIVE | Noted: 2020-03-19

## 2023-04-21 PROBLEM — N95.1 HOT FLASHES DUE TO MENOPAUSE: Status: ACTIVE | Noted: 2021-04-07

## 2023-04-21 PROBLEM — M54.2 NECK PAIN: Status: ACTIVE | Noted: 2021-04-07

## 2023-04-21 PROBLEM — M25.859 FEMOROACETABULAR IMPINGEMENT: Status: ACTIVE | Noted: 2020-10-23

## 2023-04-21 PROCEDURE — 3078F DIAST BP <80 MM HG: CPT

## 2023-04-21 PROCEDURE — 99214 OFFICE O/P EST MOD 30 MIN: CPT

## 2023-04-21 PROCEDURE — 3074F SYST BP LT 130 MM HG: CPT

## 2023-04-21 RX ORDER — LIDOCAINE 50 MG/G
PATCH TOPICAL
COMMUNITY
Start: 2021-11-22

## 2023-04-21 RX ORDER — OXYCODONE HYDROCHLORIDE AND ACETAMINOPHEN 5; 325 MG/1; MG/1
TABLET ORAL
COMMUNITY
Start: 2023-02-10 | End: 2023-04-21

## 2023-04-21 SDOH — ECONOMIC STABILITY: HOUSING INSECURITY
IN THE LAST 12 MONTHS, WAS THERE A TIME WHEN YOU DID NOT HAVE A STEADY PLACE TO SLEEP OR SLEPT IN A SHELTER (INCLUDING NOW)?: PATIENT REFUSED

## 2023-04-21 SDOH — ECONOMIC STABILITY: FOOD INSECURITY: WITHIN THE PAST 12 MONTHS, YOU WORRIED THAT YOUR FOOD WOULD RUN OUT BEFORE YOU GOT MONEY TO BUY MORE.: PATIENT DECLINED

## 2023-04-21 SDOH — ECONOMIC STABILITY: FOOD INSECURITY: WITHIN THE PAST 12 MONTHS, THE FOOD YOU BOUGHT JUST DIDN'T LAST AND YOU DIDN'T HAVE MONEY TO GET MORE.: PATIENT DECLINED

## 2023-04-21 SDOH — ECONOMIC STABILITY: INCOME INSECURITY: HOW HARD IS IT FOR YOU TO PAY FOR THE VERY BASICS LIKE FOOD, HOUSING, MEDICAL CARE, AND HEATING?: PATIENT DECLINED

## 2023-04-21 ASSESSMENT — PATIENT HEALTH QUESTIONNAIRE - PHQ9
SUM OF ALL RESPONSES TO PHQ QUESTIONS 1-9: 0
2. FEELING DOWN, DEPRESSED OR HOPELESS: 0
SUM OF ALL RESPONSES TO PHQ QUESTIONS 1-9: 0
1. LITTLE INTEREST OR PLEASURE IN DOING THINGS: 0
SUM OF ALL RESPONSES TO PHQ9 QUESTIONS 1 & 2: 0
SUM OF ALL RESPONSES TO PHQ QUESTIONS 1-9: 0
SUM OF ALL RESPONSES TO PHQ QUESTIONS 1-9: 0

## 2023-04-21 ASSESSMENT — ENCOUNTER SYMPTOMS
RESPIRATORY NEGATIVE: 1
EYES NEGATIVE: 1
ALLERGIC/IMMUNOLOGIC NEGATIVE: 1
GASTROINTESTINAL NEGATIVE: 1

## 2023-04-21 NOTE — PROGRESS NOTES
Racquel Johnston .... Subjective:     Hunter Charles 1970 is a 46 y.o. female Established patient, here for evaluation of the following:   Chief Complaint   Patient presents with    Follow-up     3 month. Patient isn't fasting today       Ms. Sixto Rhoades is here to follow up with me since she had total hip replacement on her right hip. She is doing well. Has no complaints. No SOB or chest pain.        Past Medical History:   Diagnosis Date    Chlamydia     COVID-19 vaccine series completed 2021    Pfizer vaccine     Depression 2011    Managed with meds     Endometriosis     Gonorrhea     History of right hip replacement 2023    Hypertension     Managed with meds     Migraines     PID (acute pelvic inflammatory disease)      Past Surgical History:   Procedure Laterality Date    CARDIAC CATHETERIZATION      COLONOSCOPY N/A 2021    COLONOSCOPY/ 38 performed by Santhosh Wilson MD at [address]      Hip surgery:  repaired a tear, reapproximated gluteal muscle    PELVIC LAPAROSCOPY      endometriosis      Family History   Problem Relation Age of Onset    Pulmonary Embolism Neg Hx     Deep Vein Thrombosis Neg Hx     Prostate Cancer Neg Hx     Ovarian Cancer Neg Hx     Colon Cancer Neg Hx     Heart Attack Paternal Uncle 79         from a heart attack    Cancer Paternal Grandfather     Hypertension Paternal Grandmother     Stomach Cancer Maternal Grandfather     No Known Problems Maternal Grandmother     No Known Problems Sister     No Known Problems Sister     Hypertension Father     Breast Cancer Mother 48        chemo and radiation    Diabetes Mother     Hypertension Mother     Heart Disease Mother      Social History     Tobacco Use    Smoking status: Never    Smokeless tobacco: Never   Substance Use Topics    Alcohol use: No     Alcohol/week: 0.0 standard drinks      Current Outpatient Medications   Medication Sig Dispense Refill    Handicap Placard MISC 1

## 2023-04-24 ENCOUNTER — TELEPHONE (OUTPATIENT)
Dept: FAMILY MEDICINE CLINIC | Facility: CLINIC | Age: 53
End: 2023-04-24

## 2023-04-24 DIAGNOSIS — I10 ESSENTIAL HYPERTENSION: ICD-10-CM

## 2023-04-24 DIAGNOSIS — E78.49 FAMILIAL HYPERLIPIDEMIA: ICD-10-CM

## 2023-04-24 LAB
ALBUMIN SERPL-MCNC: 3.9 G/DL (ref 3.5–5)
ALBUMIN/GLOB SERPL: 1.1 (ref 0.4–1.6)
ALP SERPL-CCNC: 92 U/L (ref 50–136)
ALT SERPL-CCNC: 24 U/L (ref 12–65)
ANION GAP SERPL CALC-SCNC: 3 MMOL/L (ref 2–11)
AST SERPL-CCNC: 21 U/L (ref 15–37)
BASOPHILS # BLD: 0 K/UL (ref 0–0.2)
BASOPHILS NFR BLD: 1 % (ref 0–2)
BILIRUB SERPL-MCNC: 0.6 MG/DL (ref 0.2–1.1)
BUN SERPL-MCNC: 14 MG/DL (ref 6–23)
CALCIUM SERPL-MCNC: 9.4 MG/DL (ref 8.3–10.4)
CHLORIDE SERPL-SCNC: 113 MMOL/L (ref 101–110)
CHOLEST SERPL-MCNC: 226 MG/DL
CO2 SERPL-SCNC: 25 MMOL/L (ref 21–32)
CREAT SERPL-MCNC: 0.8 MG/DL (ref 0.6–1)
DIFFERENTIAL METHOD BLD: ABNORMAL
EOSINOPHIL # BLD: 0.1 K/UL (ref 0–0.8)
EOSINOPHIL NFR BLD: 2 % (ref 0.5–7.8)
ERYTHROCYTE [DISTWIDTH] IN BLOOD BY AUTOMATED COUNT: 14.1 % (ref 11.9–14.6)
GLOBULIN SER CALC-MCNC: 3.5 G/DL (ref 2.8–4.5)
GLUCOSE SERPL-MCNC: 85 MG/DL (ref 65–100)
HCT VFR BLD AUTO: 45.4 % (ref 35.8–46.3)
HDLC SERPL-MCNC: 52 MG/DL (ref 40–60)
HDLC SERPL: 4.3
HGB BLD-MCNC: 14.4 G/DL (ref 11.7–15.4)
IMM GRANULOCYTES # BLD AUTO: 0 K/UL (ref 0–0.5)
IMM GRANULOCYTES NFR BLD AUTO: 0 % (ref 0–5)
LDLC SERPL CALC-MCNC: 158.6 MG/DL
LYMPHOCYTES # BLD: 1.8 K/UL (ref 0.5–4.6)
LYMPHOCYTES NFR BLD: 35 % (ref 13–44)
MCH RBC QN AUTO: 26.7 PG (ref 26.1–32.9)
MCHC RBC AUTO-ENTMCNC: 31.7 G/DL (ref 31.4–35)
MCV RBC AUTO: 84.1 FL (ref 82–102)
MONOCYTES # BLD: 0.3 K/UL (ref 0.1–1.3)
MONOCYTES NFR BLD: 6 % (ref 4–12)
NEUTS SEG # BLD: 2.9 K/UL (ref 1.7–8.2)
NEUTS SEG NFR BLD: 56 % (ref 43–78)
NRBC # BLD: 0 K/UL (ref 0–0.2)
PLATELET # BLD AUTO: 271 K/UL (ref 150–450)
PMV BLD AUTO: 10.1 FL (ref 9.4–12.3)
POTASSIUM SERPL-SCNC: 3.9 MMOL/L (ref 3.5–5.1)
PROT SERPL-MCNC: 7.4 G/DL (ref 6.3–8.2)
RBC # BLD AUTO: 5.4 M/UL (ref 4.05–5.2)
SODIUM SERPL-SCNC: 141 MMOL/L (ref 133–143)
TRIGL SERPL-MCNC: 77 MG/DL (ref 35–150)
VLDLC SERPL CALC-MCNC: 15.4 MG/DL (ref 6–23)
WBC # BLD AUTO: 5.1 K/UL (ref 4.3–11.1)

## 2023-04-25 ENCOUNTER — TELEPHONE (OUTPATIENT)
Dept: FAMILY MEDICINE CLINIC | Facility: CLINIC | Age: 53
End: 2023-04-25

## 2023-04-25 NOTE — TELEPHONE ENCOUNTER
Lab results given to her over the phone,    Her sleep study report is under the media tab 3/7/23. They listed recommendations.  She was told that you would have to write an order

## 2023-04-28 NOTE — TELEPHONE ENCOUNTER
Patient said that she will call the sleep study office and then let us know if she needs anything else from you

## 2023-05-09 ENCOUNTER — TELEPHONE (OUTPATIENT)
Dept: FAMILY MEDICINE CLINIC | Facility: CLINIC | Age: 53
End: 2023-05-09

## 2023-05-22 ENCOUNTER — OFFICE VISIT (OUTPATIENT)
Dept: FAMILY MEDICINE CLINIC | Facility: CLINIC | Age: 53
End: 2023-05-22
Payer: COMMERCIAL

## 2023-05-22 VITALS
OXYGEN SATURATION: 96 % | HEIGHT: 66 IN | BODY MASS INDEX: 33.59 KG/M2 | SYSTOLIC BLOOD PRESSURE: 118 MMHG | RESPIRATION RATE: 17 BRPM | WEIGHT: 209 LBS | TEMPERATURE: 97.8 F | HEART RATE: 64 BPM | DIASTOLIC BLOOD PRESSURE: 74 MMHG

## 2023-05-22 DIAGNOSIS — R35.0 FREQUENT URINATION: Primary | ICD-10-CM

## 2023-05-22 DIAGNOSIS — N30.00 ACUTE CYSTITIS WITHOUT HEMATURIA: ICD-10-CM

## 2023-05-22 LAB
BILIRUBIN, URINE, POC: NEGATIVE
BLOOD URINE, POC: NEGATIVE
GLUCOSE URINE, POC: NEGATIVE
KETONES, URINE, POC: NEGATIVE
LEUKOCYTE ESTERASE, URINE, POC: ABNORMAL
NITRITE, URINE, POC: NEGATIVE
PH, URINE, POC: 5 (ref 4.6–8)
PROTEIN,URINE, POC: NEGATIVE
SPECIFIC GRAVITY, URINE, POC: 1.01 (ref 1–1.03)
URINALYSIS CLARITY, POC: CLEAR
URINALYSIS COLOR, POC: YELLOW
UROBILINOGEN, POC: ABNORMAL

## 2023-05-22 PROCEDURE — 81003 URINALYSIS AUTO W/O SCOPE: CPT

## 2023-05-22 PROCEDURE — 3074F SYST BP LT 130 MM HG: CPT

## 2023-05-22 PROCEDURE — 99213 OFFICE O/P EST LOW 20 MIN: CPT

## 2023-05-22 PROCEDURE — 3078F DIAST BP <80 MM HG: CPT

## 2023-05-22 RX ORDER — CIPROFLOXACIN 500 MG/1
500 TABLET, FILM COATED ORAL 2 TIMES DAILY
Qty: 14 TABLET | Refills: 0 | Status: SHIPPED | OUTPATIENT
Start: 2023-05-22 | End: 2023-05-29

## 2023-05-22 ASSESSMENT — ENCOUNTER SYMPTOMS
RESPIRATORY NEGATIVE: 1
ALLERGIC/IMMUNOLOGIC NEGATIVE: 1
GASTROINTESTINAL NEGATIVE: 1

## 2023-05-22 ASSESSMENT — PATIENT HEALTH QUESTIONNAIRE - PHQ9: DEPRESSION UNABLE TO ASSESS: PT REFUSES

## 2023-05-22 NOTE — PROGRESS NOTES
Medication Sig Dispense Refill    ciprofloxacin (CIPRO) 500 MG tablet Take 1 tablet by mouth 2 times daily for 7 days 14 tablet 0    Handicap Placard MISC 1 each      Diclofenac Sodium 100 MG TB24 diclofenac  mg tablet,extended release 24 hr   TAKE 1 TABLET BY MOUTH EVERY DAY      lidocaine (LIDODERM) 5 % lidocaine 5 % topical patch   PLACE 2 PATCHES ON THE SKIN DAILY REMOVE & DISCARD PATCH WITHIN 12 HOURS OR AS DIRECTED BY MD      amLODIPine (NORVASC) 5 MG tablet TAKE 1 TABLET BY MOUTH EVERY DAY      estradiol (VIVELLE) 0.05 MG/24HR Change patch on sundays and Wednesday. ibuprofen (ADVIL;MOTRIN) 800 MG tablet Take 1 tablet by mouth every 8 hours as needed      SUMAtriptan (IMITREX) 100 MG tablet TAKE 1 TABLET BY MOUTH ONCE AS NEEDED FOR MIGRAINE FOR UP TO 1 DOSE       No current facility-administered medications for this visit. Allergies   Allergen Reactions    Latex Itching and Rash     Powder from latex gloves causes skin irritation/itching   Powder from latex gloves causes skin irritation/itching           Reviewed and updated this visit by provider:  Tobacco  Allergies  Meds  Problems  Med Hx  Surg Hx  Fam Hx            Review of Systems   Constitutional: Negative. HENT: Negative. Respiratory: Negative. Cardiovascular: Negative. Gastrointestinal: Negative. Endocrine: Negative. Genitourinary:  Positive for flank pain and urgency. Allergic/Immunologic: Negative. Neurological: Negative. Hematological: Negative. Psychiatric/Behavioral: Negative. Objective:     Vitals:    05/22/23 1011   BP: 118/74   Pulse: 64   Resp: 17   Temp: 97.8 °F (36.6 °C)   SpO2: 96%   Weight: 209 lb (94.8 kg)   Height: 5' 6\" (1.676 m)        Physical Exam  Vitals reviewed. Constitutional:       Appearance: Normal appearance. Cardiovascular:      Rate and Rhythm: Normal rate and regular rhythm. Pulses: Normal pulses. Heart sounds: Normal heart sounds.

## 2023-07-22 ENCOUNTER — HOSPITAL ENCOUNTER (EMERGENCY)
Age: 53
Discharge: HOME | End: 2023-07-22
Attending: EMERGENCY MEDICINE
Payer: COMMERCIAL

## 2023-07-22 VITALS
WEIGHT: 208 LBS | TEMPERATURE: 98.4 F | DIASTOLIC BLOOD PRESSURE: 64 MMHG | SYSTOLIC BLOOD PRESSURE: 124 MMHG | OXYGEN SATURATION: 97 % | BODY MASS INDEX: 32.65 KG/M2 | RESPIRATION RATE: 18 BRPM | HEART RATE: 65 BPM | HEIGHT: 67 IN

## 2023-07-22 DIAGNOSIS — L25.5 TOXICODENDRON DERMATITIS: ICD-10-CM

## 2023-07-22 DIAGNOSIS — T78.40XA ALLERGIC REACTION, INITIAL ENCOUNTER: Primary | ICD-10-CM

## 2023-07-22 PROCEDURE — 99284 EMERGENCY DEPT VISIT MOD MDM: CPT

## 2023-07-22 PROCEDURE — 6360000002 HC RX W HCPCS: Performed by: EMERGENCY MEDICINE

## 2023-07-22 PROCEDURE — 6370000000 HC RX 637 (ALT 250 FOR IP): Performed by: EMERGENCY MEDICINE

## 2023-07-22 PROCEDURE — 96372 THER/PROPH/DIAG INJ SC/IM: CPT

## 2023-07-22 RX ORDER — FAMOTIDINE 20 MG/1
20 TABLET, FILM COATED ORAL 2 TIMES DAILY
Qty: 20 TABLET | Refills: 0 | Status: SHIPPED | OUTPATIENT
Start: 2023-07-22

## 2023-07-22 RX ORDER — DEXAMETHASONE SODIUM PHOSPHATE 10 MG/ML
10 INJECTION INTRAMUSCULAR; INTRAVENOUS
Status: COMPLETED | OUTPATIENT
Start: 2023-07-22 | End: 2023-07-22

## 2023-07-22 RX ORDER — METHOCARBAMOL 500 MG/1
TABLET, FILM COATED ORAL
COMMUNITY
Start: 2023-07-20

## 2023-07-22 RX ORDER — METHYLPREDNISOLONE 4 MG/1
TABLET ORAL
Qty: 1 KIT | Refills: 0 | Status: SHIPPED | OUTPATIENT
Start: 2023-07-22

## 2023-07-22 RX ORDER — HYDROXYZINE PAMOATE 25 MG/1
25 CAPSULE ORAL 4 TIMES DAILY PRN
Qty: 20 CAPSULE | Refills: 0 | Status: SHIPPED | OUTPATIENT
Start: 2023-07-22 | End: 2023-07-29

## 2023-07-22 RX ORDER — FAMOTIDINE 20 MG/1
20 TABLET, FILM COATED ORAL ONCE
Status: COMPLETED | OUTPATIENT
Start: 2023-07-22 | End: 2023-07-22

## 2023-07-22 RX ADMIN — DEXAMETHASONE SODIUM PHOSPHATE 10 MG: 10 INJECTION INTRAMUSCULAR; INTRAVENOUS at 11:26

## 2023-07-22 RX ADMIN — FAMOTIDINE 20 MG: 20 TABLET, FILM COATED ORAL at 11:26

## 2023-07-22 ASSESSMENT — PAIN SCALES - GENERAL: PAINLEVEL_OUTOF10: 0

## 2023-07-22 ASSESSMENT — PAIN - FUNCTIONAL ASSESSMENT: PAIN_FUNCTIONAL_ASSESSMENT: NONE - DENIES PAIN

## 2023-07-22 ASSESSMENT — ENCOUNTER SYMPTOMS
SHORTNESS OF BREATH: 0
ABDOMINAL PAIN: 0

## 2023-07-22 NOTE — DISCHARGE INSTRUCTIONS
Take medications as prescribed  Do not drive or operate machinery while taking Vistaril  Call your doctor Monday for recheck  Return immediately to the ER for fever vomiting shortness of breath throat tightness or any other concerning symptoms

## 2023-07-22 NOTE — ED PROVIDER NOTES
Emergency Department Provider Note       PCP: TRAIC Ulloa NP   Age: 46 y.o. Sex: female     DISPOSITION Decision To Discharge 07/22/2023 11:13:58 AM       ICD-10-CM    1. Allergic reaction, initial encounter  T78.40XA       2. 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 Data Reviewed and Analyzed:  Category 1:   I independently ordered and reviewed each unique test.  I reviewed external records: provider visit note from PCP. I reviewed external records: provider visit note from outside specialist.       Category 2:       Category 3: Discussion of management or test interpretation. 70-year-old female patient here with complaints of an itchy rash for several days  No other concerning symptoms, specifically no difficulty swallowing shortness of breath lightheadedness dizziness or vomiting  Exam is consistent with an urticarial/exposure type rash likely poison ivy/poison oak given her recent yard work  Patient will be treated with steroids antihistamines including Vistaril and Pepcid  Return precautions discussed and referred back to her primary care doctor for recheck at the beginning of next week. Risk of Complications and/or Morbidity of Patient Management:  Prescription drug management performed and Shared medical decision making was utilized in creating the patients health plan today.     History     Isela Shrestha is a 46 y.o. female who presents to the Emergency Department with chief complaint of    Chief Complaint   Patient presents with    Skin Problem      70-year-old female patient presents with complaints of an itchy rash for 3 to 4 days  Patient states she was working in her yard at the beginning of the week and is afraid that she came in contact with poison ivy/poison oak  Denies prior episodes  No chest pain or shortness of breath  No trouble

## 2023-07-22 NOTE — ED TRIAGE NOTES
Pt has rash and hives on forehead, chest, and lower back since Tuesday. Pt states it is extremely itchy. Pt advises that she was working out in the yard this week. Pt states she took benadryl yesterday with no relief of symptoms.

## 2023-07-24 ENCOUNTER — CARE COORDINATION (OUTPATIENT)
Dept: CARE COORDINATION | Facility: CLINIC | Age: 53
End: 2023-07-24

## 2023-07-24 NOTE — CARE COORDINATION
Outreach attempted today to discuss enrollment in ACM services. Unable to reach patient. Left HIPAA compliant voice mail. Also sent message through [address] [place] [address]. Will notify ACM.

## 2023-07-31 ENCOUNTER — OFFICE VISIT (OUTPATIENT)
Dept: FAMILY MEDICINE CLINIC | Facility: CLINIC | Age: 53
End: 2023-07-31
Payer: COMMERCIAL

## 2023-07-31 ENCOUNTER — TELEPHONE (OUTPATIENT)
Dept: FAMILY MEDICINE CLINIC | Facility: CLINIC | Age: 53
End: 2023-07-31

## 2023-07-31 VITALS
WEIGHT: 204 LBS | BODY MASS INDEX: 32.02 KG/M2 | OXYGEN SATURATION: 97 % | RESPIRATION RATE: 17 BRPM | HEART RATE: 64 BPM | DIASTOLIC BLOOD PRESSURE: 76 MMHG | SYSTOLIC BLOOD PRESSURE: 128 MMHG | HEIGHT: 67 IN | TEMPERATURE: 97.6 F

## 2023-07-31 DIAGNOSIS — Z83.3 FAMILY HISTORY OF DIABETES MELLITUS IN MOTHER: ICD-10-CM

## 2023-07-31 DIAGNOSIS — G43.011 INTRACTABLE MIGRAINE WITHOUT AURA AND WITH STATUS MIGRAINOSUS: Primary | ICD-10-CM

## 2023-07-31 DIAGNOSIS — Z13.21 ENCOUNTER FOR VITAMIN DEFICIENCY SCREENING: ICD-10-CM

## 2023-07-31 DIAGNOSIS — E78.49 FAMILIAL HYPERLIPIDEMIA: ICD-10-CM

## 2023-07-31 DIAGNOSIS — F33.41 RECURRENT MAJOR DEPRESSIVE DISORDER, IN PARTIAL REMISSION (HCC): ICD-10-CM

## 2023-07-31 DIAGNOSIS — G43.011 INTRACTABLE MIGRAINE WITHOUT AURA AND WITH STATUS MIGRAINOSUS: ICD-10-CM

## 2023-07-31 DIAGNOSIS — I10 ESSENTIAL HYPERTENSION: ICD-10-CM

## 2023-07-31 DIAGNOSIS — L23.7 POISON IVY DERMATITIS: Primary | ICD-10-CM

## 2023-07-31 DIAGNOSIS — Z13.29 SCREENING FOR THYROID DISORDER: ICD-10-CM

## 2023-07-31 LAB
ALBUMIN SERPL-MCNC: 3.9 G/DL (ref 3.5–5)
ALBUMIN/GLOB SERPL: 0.9 (ref 0.4–1.6)
ALP SERPL-CCNC: 108 U/L (ref 50–136)
ALT SERPL-CCNC: 22 U/L (ref 12–65)
ANION GAP SERPL CALC-SCNC: 4 MMOL/L (ref 2–11)
AST SERPL-CCNC: 18 U/L (ref 15–37)
BASOPHILS # BLD: 0 K/UL (ref 0–0.2)
BASOPHILS NFR BLD: 1 % (ref 0–2)
BILIRUB SERPL-MCNC: 0.3 MG/DL (ref 0.2–1.1)
BUN SERPL-MCNC: 18 MG/DL (ref 6–23)
CALCIUM SERPL-MCNC: 9.9 MG/DL (ref 8.3–10.4)
CHLORIDE SERPL-SCNC: 108 MMOL/L (ref 101–110)
CHOLEST SERPL-MCNC: 230 MG/DL
CO2 SERPL-SCNC: 27 MMOL/L (ref 21–32)
CREAT SERPL-MCNC: 1 MG/DL (ref 0.6–1)
DIFFERENTIAL METHOD BLD: ABNORMAL
EOSINOPHIL # BLD: 0.2 K/UL (ref 0–0.8)
EOSINOPHIL NFR BLD: 3 % (ref 0.5–7.8)
ERYTHROCYTE [DISTWIDTH] IN BLOOD BY AUTOMATED COUNT: 15.5 % (ref 11.9–14.6)
GLOBULIN SER CALC-MCNC: 4.4 G/DL (ref 2.8–4.5)
GLUCOSE SERPL-MCNC: 83 MG/DL (ref 65–100)
HCT VFR BLD AUTO: 49 % (ref 35.8–46.3)
HDLC SERPL-MCNC: 63 MG/DL (ref 40–60)
HDLC SERPL: 3.7
HGB BLD-MCNC: 15.5 G/DL (ref 11.7–15.4)
IMM GRANULOCYTES # BLD AUTO: 0 K/UL (ref 0–0.5)
IMM GRANULOCYTES NFR BLD AUTO: 1 % (ref 0–5)
LDLC SERPL CALC-MCNC: 146.8 MG/DL
LYMPHOCYTES # BLD: 2.4 K/UL (ref 0.5–4.6)
LYMPHOCYTES NFR BLD: 37 % (ref 13–44)
MCH RBC QN AUTO: 27.1 PG (ref 26.1–32.9)
MCHC RBC AUTO-ENTMCNC: 31.6 G/DL (ref 31.4–35)
MCV RBC AUTO: 85.8 FL (ref 82–102)
MONOCYTES # BLD: 0.5 K/UL (ref 0.1–1.3)
MONOCYTES NFR BLD: 7 % (ref 4–12)
NEUTS SEG # BLD: 3.3 K/UL (ref 1.7–8.2)
NEUTS SEG NFR BLD: 51 % (ref 43–78)
NRBC # BLD: 0 K/UL (ref 0–0.2)
PLATELET # BLD AUTO: 269 K/UL (ref 150–450)
PMV BLD AUTO: 10.4 FL (ref 9.4–12.3)
POTASSIUM SERPL-SCNC: 4.1 MMOL/L (ref 3.5–5.1)
PROT SERPL-MCNC: 8.3 G/DL (ref 6.3–8.2)
RBC # BLD AUTO: 5.71 M/UL (ref 4.05–5.2)
SODIUM SERPL-SCNC: 139 MMOL/L (ref 133–143)
TRIGL SERPL-MCNC: 101 MG/DL (ref 35–150)
TSH, 3RD GENERATION: 1.49 UIU/ML (ref 0.36–3.74)
VLDLC SERPL CALC-MCNC: 20.2 MG/DL (ref 6–23)
WBC # BLD AUTO: 6.4 K/UL (ref 4.3–11.1)

## 2023-07-31 PROCEDURE — 3078F DIAST BP <80 MM HG: CPT

## 2023-07-31 PROCEDURE — 99215 OFFICE O/P EST HI 40 MIN: CPT

## 2023-07-31 PROCEDURE — 3074F SYST BP LT 130 MM HG: CPT

## 2023-07-31 RX ORDER — BUTALBITAL, ACETAMINOPHEN AND CAFFEINE 50; 325; 40 MG/1; MG/1; MG/1
1 TABLET ORAL EVERY 4 HOURS PRN
Qty: 180 TABLET | Refills: 3 | Status: SHIPPED | OUTPATIENT
Start: 2023-07-31

## 2023-07-31 RX ORDER — SUMATRIPTAN 100 MG/1
100 TABLET, FILM COATED ORAL
Qty: 9 TABLET | Refills: 2 | Status: SHIPPED | OUTPATIENT
Start: 2023-07-31 | End: 2023-07-31

## 2023-07-31 RX ORDER — PREDNISONE 10 MG/1
TABLET ORAL
Qty: 12 TABLET | Refills: 0 | Status: SHIPPED | OUTPATIENT
Start: 2023-07-31

## 2023-07-31 ASSESSMENT — ENCOUNTER SYMPTOMS
EYES NEGATIVE: 1
GASTROINTESTINAL NEGATIVE: 1
RESPIRATORY NEGATIVE: 1
ALLERGIC/IMMUNOLOGIC NEGATIVE: 1

## 2023-07-31 NOTE — TELEPHONE ENCOUNTER
Patient said that she isn't taking imitrex. She needs a refill  On butalbital-APAP-caffeine -40 mg.   Take 1 capsule by mouth every 4 hours as neede

## 2023-07-31 NOTE — PROGRESS NOTES
Benton Leal ........................... Subjective:     Schuyler Ruiz 1970 is a 46 y.o. female Established patient, here for evaluation of the following:   Chief Complaint   Patient presents with    Hypertension    Other     Her parents have DM. She wants to get check for a couple other things       Ms. Marcial Ryan is here because she states that she wants to have some labs because mother was just recently diagnosed with T2DM. She states that she would also like to have her TSH and Vitamin D level checked. She does not have any other issues.      Hypertension    Other      Past Medical History:   Diagnosis Date    Chlamydia     COVID-19 vaccine series completed 2021    Pfizer vaccine     Depression 2011    Managed with meds     Endometriosis     Gonorrhea     History of right hip replacement 2023    Hypertension     Managed with meds     Migraines     PID (acute pelvic inflammatory disease)      Past Surgical History:   Procedure Laterality Date    CARDIAC CATHETERIZATION      COLONOSCOPY N/A 2021    COLONOSCOPY/ 38 performed by Casper Fried MD at [address] [place] [address]      Hip surgery:  repaired a tear, reapproximated gluteal muscle    PELVIC LAPAROSCOPY      endometriosis      Family History   Problem Relation Age of Onset    Pulmonary Embolism Neg Hx     Deep Vein Thrombosis Neg Hx     Prostate Cancer Neg Hx     Ovarian Cancer Neg Hx     Colon Cancer Neg Hx     Heart Attack Paternal Uncle 79         from a heart attack    Cancer Paternal Grandfather     Hypertension Paternal Grandmother     Stomach Cancer Maternal Grandfather     No Known Problems Maternal Grandmother     No Known Problems Sister     No Known Problems Sister     Hypertension Father     Breast Cancer Mother 48        chemo and radiation    Diabetes Mother     Hypertension Mother     Heart Disease Mother      Social History     Tobacco Use    Smoking status: Never    Smokeless tobacco:

## 2023-08-01 DIAGNOSIS — E55.9 VITAMIN D DEFICIENCY: Primary | ICD-10-CM

## 2023-08-01 LAB
25(OH)D3 SERPL-MCNC: 18 NG/ML (ref 30–100)
EST. AVERAGE GLUCOSE BLD GHB EST-MCNC: 105 MG/DL
HBA1C MFR BLD: 5.3 % (ref 4.8–5.6)

## 2023-08-01 RX ORDER — ERGOCALCIFEROL 1.25 MG/1
50000 CAPSULE ORAL WEEKLY
Qty: 12 CAPSULE | Refills: 1 | Status: SHIPPED | OUTPATIENT
Start: 2023-08-01

## 2023-08-24 NOTE — PROGRESS NOTES
[address] [place] [address]  [phone]    Patient Name:  Grace Andres  YOB: 1970      Office Visit 8/25/2023    CHIEF COMPLAINT:    Chief Complaint   Patient presents with    Results    Sleep Apnea    Sleep Study    New Patient         HISTORY OF PRESENT ILLNESS:  Patient is an 46 y.o. female seen today for new pt evaluation. Pt had a PSG/HST on 3/5/23 with an AHI of 7.2/hr with desaturations to 80%. Pt reports that she is tired. She does snore. She snores loudly and has woken herself up snoring. She does wake up on occasion gasping for air. She does wake up about 2 times during the night to use the restroom. She does take a while to fall back to sleep. She does have morning headaches on occasion. She has a headache today but thinks that it is because she ate a pork chop last night for dinner. Pt reports that some days she will wake up refreshed. She does nap on occasion and her naps are 3-4 hours in length. She wakes up feeling refreshed. She goes to bed around 10am and gets up at 6am.   We reviewed sleep apnea, risks of untreated sleep apnea,and treatment of sleep apnea. We reviewed her sleep study in detail. She would like to be referred for evaluation for an oral appliance first and then consider CPAP.          Sleep Medicine 8/25/2023   Sitting and reading 3   Watching TV 2   Sitting, inactive in a public place (e.g. a theatre or a meeting) 3   As a passenger in a car for an hour without a break 2   Lying down to rest in the afternoon when circumstances permit 2   Sitting and talking to someone 0   Sitting quietly after a lunch without alcohol 2   In a car, while stopped for a few minutes in traffic 0   [place]evay Sleepiness Score 14         Past Medical History:   Diagnosis Date    Chlamydia     COVID-19 vaccine series completed 04/20/2021    [place] vaccine     Depression 08/16/2011    Managed with meds     Endometriosis     Gonorrhea

## 2023-08-25 ENCOUNTER — OFFICE VISIT (OUTPATIENT)
Dept: SLEEP MEDICINE | Age: 53
End: 2023-08-25
Payer: COMMERCIAL

## 2023-08-25 VITALS
BODY MASS INDEX: 32.47 KG/M2 | RESPIRATION RATE: 15 BRPM | DIASTOLIC BLOOD PRESSURE: 74 MMHG | SYSTOLIC BLOOD PRESSURE: 122 MMHG | OXYGEN SATURATION: 98 % | HEIGHT: 66 IN | WEIGHT: 202 LBS | HEART RATE: 60 BPM | TEMPERATURE: 97.2 F

## 2023-08-25 DIAGNOSIS — G47.10 HYPERSOMNOLENCE: ICD-10-CM

## 2023-08-25 DIAGNOSIS — G47.34 NOCTURNAL HYPOXEMIA: ICD-10-CM

## 2023-08-25 DIAGNOSIS — G47.33 OSA (OBSTRUCTIVE SLEEP APNEA): Primary | ICD-10-CM

## 2023-08-25 PROCEDURE — 3074F SYST BP LT 130 MM HG: CPT | Performed by: PHYSICIAN ASSISTANT

## 2023-08-25 PROCEDURE — 99203 OFFICE O/P NEW LOW 30 MIN: CPT | Performed by: PHYSICIAN ASSISTANT

## 2023-08-25 PROCEDURE — 3078F DIAST BP <80 MM HG: CPT | Performed by: PHYSICIAN ASSISTANT

## 2023-08-25 ASSESSMENT — SLEEP AND FATIGUE QUESTIONNAIRES
HOW LIKELY ARE YOU TO NOD OFF OR FALL ASLEEP WHILE LYING DOWN TO REST IN THE AFTERNOON WHEN CIRCUMSTANCES PERMIT: 2
HOW LIKELY ARE YOU TO NOD OFF OR FALL ASLEEP WHILE SITTING AND READING: 3
HOW LIKELY ARE YOU TO NOD OFF OR FALL ASLEEP WHEN YOU ARE A PASSENGER IN A CAR FOR AN HOUR WITHOUT A BREAK: 2
HOW LIKELY ARE YOU TO NOD OFF OR FALL ASLEEP WHILE WATCHING TV: 2
HOW LIKELY ARE YOU TO NOD OFF OR FALL ASLEEP WHILE SITTING INACTIVE IN A PUBLIC PLACE: 3
HOW LIKELY ARE YOU TO NOD OFF OR FALL ASLEEP WHILE SITTING QUIETLY AFTER LUNCH WITHOUT ALCOHOL: 2
HOW LIKELY ARE YOU TO NOD OFF OR FALL ASLEEP WHILE SITTING AND TALKING TO SOMEONE: 0
HOW LIKELY ARE YOU TO NOD OFF OR FALL ASLEEP IN A CAR, WHILE STOPPED FOR A FEW MINUTES IN TRAFFIC: 0
ESS TOTAL SCORE: 14

## 2023-09-01 ENCOUNTER — TELEPHONE (OUTPATIENT)
Dept: FAMILY MEDICINE CLINIC | Facility: CLINIC | Age: 53
End: 2023-09-01

## 2023-09-01 NOTE — TELEPHONE ENCOUNTER
Pt got a letter in the mail about her high cholesterol and wants to know if she will put on medication. Call pt to advise.

## 2023-10-05 ENCOUNTER — HOSPITAL ENCOUNTER (OUTPATIENT)
Dept: MAMMOGRAPHY | Age: 53
Discharge: HOME | End: 2023-10-05
Payer: COMMERCIAL

## 2023-10-05 VITALS — WEIGHT: 208 LBS | BODY MASS INDEX: 33.43 KG/M2 | HEIGHT: 66 IN

## 2023-10-05 DIAGNOSIS — Z12.31 ENCOUNTER FOR SCREENING MAMMOGRAM FOR MALIGNANT NEOPLASM OF BREAST: ICD-10-CM

## 2023-10-05 PROCEDURE — 77063 BREAST TOMOSYNTHESIS BI: CPT

## 2023-10-20 ENCOUNTER — OFFICE VISIT (OUTPATIENT)
Dept: FAMILY MEDICINE CLINIC | Facility: CLINIC | Age: 53
End: 2023-10-20
Payer: COMMERCIAL

## 2023-10-20 VITALS
DIASTOLIC BLOOD PRESSURE: 78 MMHG | OXYGEN SATURATION: 98 % | RESPIRATION RATE: 17 BRPM | WEIGHT: 201 LBS | BODY MASS INDEX: 31.55 KG/M2 | HEART RATE: 62 BPM | HEIGHT: 67 IN | SYSTOLIC BLOOD PRESSURE: 118 MMHG | TEMPERATURE: 97.9 F

## 2023-10-20 DIAGNOSIS — G89.29 CHRONIC RIGHT HIP PAIN: ICD-10-CM

## 2023-10-20 DIAGNOSIS — M54.50 LUMBAR BACK PAIN: Primary | ICD-10-CM

## 2023-10-20 DIAGNOSIS — M25.551 CHRONIC RIGHT HIP PAIN: ICD-10-CM

## 2023-10-20 PROCEDURE — 99213 OFFICE O/P EST LOW 20 MIN: CPT

## 2023-10-20 PROCEDURE — 3078F DIAST BP <80 MM HG: CPT

## 2023-10-20 PROCEDURE — 3074F SYST BP LT 130 MM HG: CPT

## 2023-10-20 ASSESSMENT — ENCOUNTER SYMPTOMS
EYES NEGATIVE: 1
BACK PAIN: 1
ALLERGIC/IMMUNOLOGIC NEGATIVE: 1
RESPIRATORY NEGATIVE: 1
GASTROINTESTINAL NEGATIVE: 1

## 2023-10-20 NOTE — PROGRESS NOTES
Gurdeep Numbers Gurdeep Numbers Clesteve Numbers .... Subjective:     Martin Booth 1970 is a 48 y.o. female Established patient, here for evaluation of the following:   Chief Complaint   Patient presents with    Other     Disability paper work for RT hip surgery       Apoorva Baez presents to the clinic to have have disability paperwork filled out. Patient had an accident on 2020  trying to get cart down [place] that had a bad wheel and that is when she hurt her right hip and back. She has since a had total hip replacement on 2023 and has had back pain since, requiring steroid injections. She has been unable to go back to work since  because she can not tolerate walking, bending, sitting for long periods of time.          Past Medical History:   Diagnosis Date    Chlamydia     COVID-19 vaccine series completed 2021    Pfizer vaccine     Depression 2011    Managed with meds     Endometriosis     Gonorrhea     History of right hip replacement 2023    Hypertension     Managed with meds     Migraines     PID (acute pelvic inflammatory disease)      Past Surgical History:   Procedure Laterality Date    CARDIAC CATHETERIZATION      COLONOSCOPY N/A 2021    COLONOSCOPY/ 38 performed by Florence Karimi MD at [address] [facility]      Hip surgery:  repaired a tear, reapproximated gluteal muscle    PELVIC LAPAROSCOPY      endometriosis      Family History   Problem Relation Age of Onset    Pulmonary Embolism Neg Hx     Deep Vein Thrombosis Neg Hx     Prostate Cancer Neg Hx     Ovarian Cancer Neg Hx     Colon Cancer Neg Hx     Heart Attack Paternal Uncle 79         from a heart attack    Cancer Paternal Grandfather     Hypertension Paternal Grandmother     Stomach Cancer Maternal Grandfather     No Known Problems Maternal Grandmother     No Known Problems Sister     No Known Problems Sister     Hypertension Father     Breast Cancer Mother 48        chemo and radiation    Diabetes

## 2024-02-06 ENCOUNTER — OFFICE VISIT (OUTPATIENT)
Dept: FAMILY MEDICINE CLINIC | Facility: CLINIC | Age: 54
End: 2024-02-06
Payer: COMMERCIAL

## 2024-02-06 VITALS
WEIGHT: 206 LBS | TEMPERATURE: 97.8 F | HEART RATE: 64 BPM | RESPIRATION RATE: 17 BRPM | BODY MASS INDEX: 32.33 KG/M2 | SYSTOLIC BLOOD PRESSURE: 120 MMHG | DIASTOLIC BLOOD PRESSURE: 80 MMHG | OXYGEN SATURATION: 98 % | HEIGHT: 67 IN

## 2024-02-06 DIAGNOSIS — E78.49 FAMILIAL HYPERLIPIDEMIA: ICD-10-CM

## 2024-02-06 DIAGNOSIS — E55.9 VITAMIN D DEFICIENCY: ICD-10-CM

## 2024-02-06 DIAGNOSIS — N95.1 MENOPAUSAL HOT FLUSHES: ICD-10-CM

## 2024-02-06 DIAGNOSIS — F33.41 RECURRENT MAJOR DEPRESSIVE DISORDER, IN PARTIAL REMISSION (HCC): ICD-10-CM

## 2024-02-06 DIAGNOSIS — I10 ESSENTIAL HYPERTENSION: Primary | ICD-10-CM

## 2024-02-06 LAB
25(OH)D3 SERPL-MCNC: 19.1 NG/ML (ref 30–100)
ALBUMIN SERPL-MCNC: 4.1 G/DL (ref 3.5–5)
ALBUMIN/GLOB SERPL: 1.1 (ref 0.4–1.6)
ALP SERPL-CCNC: 96 U/L (ref 50–136)
ALT SERPL-CCNC: 25 U/L (ref 12–65)
ANION GAP SERPL CALC-SCNC: 6 MMOL/L (ref 2–11)
AST SERPL-CCNC: 21 U/L (ref 15–37)
BASOPHILS # BLD: 0 K/UL (ref 0–0.2)
BASOPHILS NFR BLD: 1 % (ref 0–2)
BILIRUB SERPL-MCNC: 0.3 MG/DL (ref 0.2–1.1)
BUN SERPL-MCNC: 14 MG/DL (ref 6–23)
CALCIUM SERPL-MCNC: 9.7 MG/DL (ref 8.3–10.4)
CHLORIDE SERPL-SCNC: 111 MMOL/L (ref 103–113)
CHOLEST SERPL-MCNC: 232 MG/DL
CO2 SERPL-SCNC: 25 MMOL/L (ref 21–32)
CREAT SERPL-MCNC: 1 MG/DL (ref 0.6–1)
DIFFERENTIAL METHOD BLD: ABNORMAL
EOSINOPHIL # BLD: 0.1 K/UL (ref 0–0.8)
EOSINOPHIL NFR BLD: 1 % (ref 0.5–7.8)
ERYTHROCYTE [DISTWIDTH] IN BLOOD BY AUTOMATED COUNT: 13.6 % (ref 11.9–14.6)
GLOBULIN SER CALC-MCNC: 3.9 G/DL (ref 2.8–4.5)
GLUCOSE SERPL-MCNC: 77 MG/DL (ref 65–100)
HCT VFR BLD AUTO: 48.6 % (ref 35.8–46.3)
HDLC SERPL-MCNC: 51 MG/DL (ref 40–60)
HDLC SERPL: 4.5
HGB BLD-MCNC: 16.1 G/DL (ref 11.7–15.4)
IMM GRANULOCYTES # BLD AUTO: 0 K/UL (ref 0–0.5)
IMM GRANULOCYTES NFR BLD AUTO: 0 % (ref 0–5)
LDLC SERPL CALC-MCNC: 151.2 MG/DL
LYMPHOCYTES # BLD: 2.1 K/UL (ref 0.5–4.6)
LYMPHOCYTES NFR BLD: 34 % (ref 13–44)
MCH RBC QN AUTO: 28.3 PG (ref 26.1–32.9)
MCHC RBC AUTO-ENTMCNC: 33.1 G/DL (ref 31.4–35)
MCV RBC AUTO: 85.4 FL (ref 82–102)
MONOCYTES # BLD: 0.4 K/UL (ref 0.1–1.3)
MONOCYTES NFR BLD: 6 % (ref 4–12)
NEUTS SEG # BLD: 3.5 K/UL (ref 1.7–8.2)
NEUTS SEG NFR BLD: 58 % (ref 43–78)
NRBC # BLD: 0 K/UL (ref 0–0.2)
PLATELET # BLD AUTO: 255 K/UL (ref 150–450)
PMV BLD AUTO: 10.6 FL (ref 9.4–12.3)
POTASSIUM SERPL-SCNC: 3.4 MMOL/L (ref 3.5–5.1)
PROT SERPL-MCNC: 8 G/DL (ref 6.3–8.2)
RBC # BLD AUTO: 5.69 M/UL (ref 4.05–5.2)
SODIUM SERPL-SCNC: 142 MMOL/L (ref 136–146)
TRIGL SERPL-MCNC: 149 MG/DL (ref 35–150)
VLDLC SERPL CALC-MCNC: 29.8 MG/DL (ref 6–23)
WBC # BLD AUTO: 6.1 K/UL (ref 4.3–11.1)

## 2024-02-06 PROCEDURE — 3074F SYST BP LT 130 MM HG: CPT

## 2024-02-06 PROCEDURE — 3079F DIAST BP 80-89 MM HG: CPT

## 2024-02-06 PROCEDURE — 99214 OFFICE O/P EST MOD 30 MIN: CPT

## 2024-02-06 RX ORDER — AMLODIPINE BESYLATE 5 MG/1
5 TABLET ORAL DAILY
Qty: 30 TABLET | Refills: 11 | Status: SHIPPED | OUTPATIENT
Start: 2024-02-06

## 2024-02-06 RX ORDER — FLUOXETINE HYDROCHLORIDE 20 MG/1
20 CAPSULE ORAL DAILY
Qty: 30 CAPSULE | Refills: 3 | Status: SHIPPED | OUTPATIENT
Start: 2024-02-06

## 2024-02-06 ASSESSMENT — ENCOUNTER SYMPTOMS
RESPIRATORY NEGATIVE: 1
EYES NEGATIVE: 1
BACK PAIN: 1
GASTROINTESTINAL NEGATIVE: 1

## 2024-02-06 ASSESSMENT — PATIENT HEALTH QUESTIONNAIRE - PHQ9
SUM OF ALL RESPONSES TO PHQ QUESTIONS 1-9: 0
3. TROUBLE FALLING OR STAYING ASLEEP: 0
7. TROUBLE CONCENTRATING ON THINGS, SUCH AS READING THE NEWSPAPER OR WATCHING TELEVISION: 0
SUM OF ALL RESPONSES TO PHQ9 QUESTIONS 1 & 2: 0
8. MOVING OR SPEAKING SO SLOWLY THAT OTHER PEOPLE COULD HAVE NOTICED. OR THE OPPOSITE, BEING SO FIGETY OR RESTLESS THAT YOU HAVE BEEN MOVING AROUND A LOT MORE THAN USUAL: 0
1. LITTLE INTEREST OR PLEASURE IN DOING THINGS: 0
SUM OF ALL RESPONSES TO PHQ QUESTIONS 1-9: 0
2. FEELING DOWN, DEPRESSED OR HOPELESS: 0
SUM OF ALL RESPONSES TO PHQ QUESTIONS 1-9: 0
4. FEELING TIRED OR HAVING LITTLE ENERGY: 0
10. IF YOU CHECKED OFF ANY PROBLEMS, HOW DIFFICULT HAVE THESE PROBLEMS MADE IT FOR YOU TO DO YOUR WORK, TAKE CARE OF THINGS AT HOME, OR GET ALONG WITH OTHER PEOPLE: 0
SUM OF ALL RESPONSES TO PHQ QUESTIONS 1-9: 0
6. FEELING BAD ABOUT YOURSELF - OR THAT YOU ARE A FAILURE OR HAVE LET YOURSELF OR YOUR FAMILY DOWN: 0
9. THOUGHTS THAT YOU WOULD BE BETTER OFF DEAD, OR OF HURTING YOURSELF: 0

## 2024-02-07 ENCOUNTER — OFFICE VISIT (OUTPATIENT)
Dept: NEUROSURGERY | Age: 54
End: 2024-02-07
Payer: COMMERCIAL

## 2024-02-07 VITALS
HEART RATE: 57 BPM | SYSTOLIC BLOOD PRESSURE: 123 MMHG | DIASTOLIC BLOOD PRESSURE: 55 MMHG | HEIGHT: 67 IN | OXYGEN SATURATION: 99 % | WEIGHT: 204 LBS | TEMPERATURE: 97.3 F | BODY MASS INDEX: 32.02 KG/M2

## 2024-02-07 DIAGNOSIS — M54.41 CHRONIC BILATERAL LOW BACK PAIN WITH RIGHT-SIDED SCIATICA: Primary | ICD-10-CM

## 2024-02-07 DIAGNOSIS — S39.012A STRAIN OF LUMBAR REGION, INITIAL ENCOUNTER: ICD-10-CM

## 2024-02-07 DIAGNOSIS — G89.29 CHRONIC BILATERAL LOW BACK PAIN WITH RIGHT-SIDED SCIATICA: Primary | ICD-10-CM

## 2024-02-07 DIAGNOSIS — E66.9 OBESITY (BMI 30-39.9): ICD-10-CM

## 2024-02-07 PROCEDURE — 3078F DIAST BP <80 MM HG: CPT | Performed by: NEUROLOGICAL SURGERY

## 2024-02-07 PROCEDURE — 99204 OFFICE O/P NEW MOD 45 MIN: CPT | Performed by: NEUROLOGICAL SURGERY

## 2024-02-07 PROCEDURE — 3074F SYST BP LT 130 MM HG: CPT | Performed by: NEUROLOGICAL SURGERY

## 2024-02-07 NOTE — PROGRESS NOTES
every 8 hours as needed       No current facility-administered medications for this visit.       Patient Active Problem List   Diagnosis    Intractable migraine without aura and with status migrainosus    Syncope and collapse    Recurrent depression (HCC)    Essential hypertension    Anxiety    Neck pain    Muscle spasms of neck    Migraines    Chest pain in adult    Family history of breast cancer in mother    Familial hyperlipidemia    Psychophysiological insomnia    Hot flashes due to menopause    Chondromalacia of right hip    Femoroacetabular impingement    Overweight    Pain of right hip joint    Chronic bilateral low back pain with right-sided sciatica    Strain of lumbar region    Obesity (BMI 30-39.9)        Review of Systems: A complete ROS was done and as stated in the HPI or otherwise negative.    BP (!) 123/55   Pulse 57   Temp 97.3 °F (36.3 °C)   Ht 1.702 m (5' 7\")   Wt 92.5 kg (204 lb)   SpO2 99%   BMI 31.95 kg/m²        Physical Exam  The physical exam findings are as follows:      Cranial Nerves:   Intact visual fields. RICH, EOM's full, no nystagmus, no ptosis. Facial sensation is normal. Corneal reflexes are intact. Facial movement is symmetric. Hearing is normal bilaterally. Palate is midline with normal sternocleidomastoid and trapezius muscles are normal. Tongue is midline.   Motor:  5/5 strength in upper and lower proximal and distal muscles. Normal bulk and tone. No fasciculations.   Reflexes:   Deep tendon reflexes 2+/4 and symmetrical.   Sensory:   Normal to touch, pinprick    Gait:  Normal gait.   Tremor:   No tremor noted.   Cerebellar:  No cerebellar signs present.              Assessment & Plan      ICD-10-CM    1. Chronic bilateral low back pain with right-sided sciatica  M54.41     G89.29       2. Strain of lumbar region, initial encounter  S39.012A       3. Obesity (BMI 30-39.9)  E66.9       MRI lumbar spine rule out HNP.  Return visit after for  evaluation and treatment

## 2024-02-17 ENCOUNTER — HOSPITAL ENCOUNTER (OUTPATIENT)
Dept: MRI IMAGING | Age: 54
End: 2024-02-17
Attending: NEUROLOGICAL SURGERY
Payer: COMMERCIAL

## 2024-02-17 DIAGNOSIS — S39.012A STRAIN OF LUMBAR REGION, INITIAL ENCOUNTER: ICD-10-CM

## 2024-02-17 DIAGNOSIS — G89.29 CHRONIC BILATERAL LOW BACK PAIN WITH RIGHT-SIDED SCIATICA: ICD-10-CM

## 2024-02-17 DIAGNOSIS — M54.41 CHRONIC BILATERAL LOW BACK PAIN WITH RIGHT-SIDED SCIATICA: ICD-10-CM

## 2024-02-17 DIAGNOSIS — E66.9 OBESITY (BMI 30-39.9): ICD-10-CM

## 2024-02-17 PROCEDURE — 72148 MRI LUMBAR SPINE W/O DYE: CPT

## 2024-02-21 ENCOUNTER — TELEPHONE (OUTPATIENT)
Dept: FAMILY MEDICINE CLINIC | Facility: CLINIC | Age: 54
End: 2024-02-21

## 2024-02-21 NOTE — TELEPHONE ENCOUNTER
Vitamin D is still low, I would really make sure that you are taking the vitamin D supplement, do you need a refill?     Total cholesterol is high as well as LDL, please make sure you are watching your diet: Low sugar, high lean protein and low carbs     CMP: ok, potassium a little low  Your RBC and hemoglobin and hematocrit still high, are you using your sleep machine?

## 2024-02-21 NOTE — TELEPHONE ENCOUNTER
1.she needs vit d faxed into [id number]    2.She got a mandibular advancement device instead of a CPAP. She missed her last apt so she is going to call them and tell them that she will try the CPAP machine    3.Does she need to take medication for her cholesterol?

## 2024-02-22 DIAGNOSIS — E55.9 VITAMIN D DEFICIENCY: Primary | ICD-10-CM

## 2024-02-22 RX ORDER — ERGOCALCIFEROL 1.25 MG/1
50000 CAPSULE ORAL WEEKLY
Qty: 12 CAPSULE | Refills: 1 | Status: SHIPPED | OUTPATIENT
Start: 2024-02-22

## 2024-02-22 NOTE — PROGRESS NOTES
The 10-year ASCVD risk score (Stephania PARKS, et al., 2019) is: 4.6%    Values used to calculate the score:      Age: 53 years      Sex: Female      Is Non- : Yes      Diabetic: No      Tobacco smoker: No      Systolic Blood Pressure: 123 mmHg      Is BP treated: Yes      HDL Cholesterol: 51 MG/DL      Total Cholesterol: 232 MG/DL

## 2024-02-22 NOTE — TELEPHONE ENCOUNTER
I would love for her to try and get her cholesterol levels down with diet and exercise. She can also start taking Red Yeast Rice. If the levels do not go down by next visit we can revisit the conversation of the cholesterol meds.

## 2024-02-26 ENCOUNTER — OFFICE VISIT (OUTPATIENT)
Dept: NEUROSURGERY | Age: 54
End: 2024-02-26
Payer: COMMERCIAL

## 2024-02-26 VITALS
HEART RATE: 62 BPM | OXYGEN SATURATION: 97 % | WEIGHT: 200.4 LBS | BODY MASS INDEX: 31.45 KG/M2 | SYSTOLIC BLOOD PRESSURE: 138 MMHG | DIASTOLIC BLOOD PRESSURE: 65 MMHG | HEIGHT: 67 IN | TEMPERATURE: 98.3 F

## 2024-02-26 DIAGNOSIS — S39.012A STRAIN OF LUMBAR REGION, INITIAL ENCOUNTER: Primary | ICD-10-CM

## 2024-02-26 DIAGNOSIS — M54.41 CHRONIC BILATERAL LOW BACK PAIN WITH RIGHT-SIDED SCIATICA: ICD-10-CM

## 2024-02-26 DIAGNOSIS — E66.9 OBESITY (BMI 30-39.9): ICD-10-CM

## 2024-02-26 DIAGNOSIS — G89.29 CHRONIC BILATERAL LOW BACK PAIN WITH RIGHT-SIDED SCIATICA: ICD-10-CM

## 2024-02-26 PROCEDURE — 3078F DIAST BP <80 MM HG: CPT | Performed by: NEUROLOGICAL SURGERY

## 2024-02-26 PROCEDURE — 3075F SYST BP GE 130 - 139MM HG: CPT | Performed by: NEUROLOGICAL SURGERY

## 2024-02-26 PROCEDURE — 99204 OFFICE O/P NEW MOD 45 MIN: CPT | Performed by: NEUROLOGICAL SURGERY

## 2024-02-26 NOTE — PROGRESS NOTES
History of Present Illness    Patient Words: 53 y.o.     This patient is a 53 y.o. female who presents today for neurosurgical follow-up.  Lumbar MRI scan was reviewed in the office today and it is a normal for age MRI scan.  She has well-preserved disc space height, normal lordosis, no central stenosis, all the discs have a white color in them and T2 weighted imaging since the last L5/S1.  In summary, this appears to represent normal for age MRI scan of the spine.  No pathology is seen to my review.  Films were reviewed with the patient in the office today.    Past Medical History:   Diagnosis Date    Chlamydia     COVID-19 vaccine series completed 2021    Pfizer vaccine     Depression 2011    Managed with meds     Endometriosis     Gonorrhea     History of right hip replacement 2023    Hypertension     Managed with meds     Migraines     PID (acute pelvic inflammatory disease)         Allergies   Allergen Reactions    Latex Itching and Rash     Powder from latex gloves causes skin irritation/itching   Powder from latex gloves causes skin irritation/itching           Family History   Problem Relation Age of Onset    Pulmonary Embolism Neg Hx     Deep Vein Thrombosis Neg Hx     Prostate Cancer Neg Hx     Ovarian Cancer Neg Hx     Colon Cancer Neg Hx     Heart Attack Paternal Uncle 70         from a heart attack    Cancer Paternal Grandfather     Hypertension Paternal Grandmother     Stomach Cancer Maternal Grandfather     No Known Problems Maternal Grandmother     No Known Problems Sister     No Known Problems Sister     Hypertension Father     Breast Cancer Mother 53        chemo and radiation    Diabetes Mother     Hypertension Mother     Heart Disease Mother         Social History     Socioeconomic History    Marital status: Single     Spouse name: Not on file    Number of children: Not on file    Years of education: Not on file    Highest education level: Not on file   Occupational History  pt at baseline function, 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

## 2024-03-12 ENCOUNTER — TELEPHONE (OUTPATIENT)
Dept: FAMILY MEDICINE CLINIC | Facility: CLINIC | Age: 54
End: 2024-03-12

## 2024-03-12 NOTE — TELEPHONE ENCOUNTER
Ani Michael APRN - NP looked over your labs       The 10-year ASCVD risk score (Stephania PARKS, et al., 2019) is: 4.6%    Values used to calculate the score:      Age: 53 years      Sex: Female      Is Non- : Yes      Diabetic: No      Tobacco smoker: No      Systolic Blood Pressure: 123 mmHg      Is BP treated: Yes      HDL Cholesterol: 51 MG/DL      Total Cholesterol: 232 MG/DL    Cardiovascular risk score is still below 5%, I would love for her to try and get her cholesterol levels down with diet and exercise. She can also start taking Red Yeast Rice. If the levels do not go down by next visit we can revisit the conversation of the cholesterol meds.      Have a good day!     Lucita            Audit [place]

## 2024-03-21 ENCOUNTER — TELEPHONE (OUTPATIENT)
Dept: FAMILY MEDICINE CLINIC | Facility: CLINIC | Age: 54
End: 2024-03-21

## 2024-03-21 NOTE — TELEPHONE ENCOUNTER
Labs mailed    Ani Michael APRN - NP looked over your labs       The 10-year ASCVD risk score (Stephania PARKS, et al., 2019) is: 4.6%    Values used to calculate the score:      Age: 53 years      Sex: Female      Is Non- : Yes      Diabetic: No      Tobacco smoker: No      Systolic Blood Pressure: 123 mmHg      Is BP treated: Yes      HDL Cholesterol: 51 MG/DL      Total Cholesterol: 232 MG/DL    Cardiovascular risk score is still below 5%, I would love for her to try and get her cholesterol levels down with diet and exercise. She can also start taking Red Yeast Rice. If the levels do not go down by next visit we can revisit the conversation of the cholesterol meds.      Have a good day!     Lucita

## 2024-03-26 ENCOUNTER — TELEPHONE (OUTPATIENT)
Dept: FAMILY MEDICINE CLINIC | Facility: CLINIC | Age: 54
End: 2024-03-26

## 2024-03-26 NOTE — TELEPHONE ENCOUNTER
-- DO NOT REPLY / DO NOT REPLY ALL --  -- Message is from the [facility]--    COVID-19 Universal Screening: Negative    Patient is requesting a medication refill - medication is on active medication list.      Patient is currently OUT of the requested medication.    Was Medication Pended?  No.     Rx Name and Dose:  Medication was not on the list. Please call patient when prescription has been sent to pharmacy.    Duration: 30 days    Pharmacy  [facility] [address] [place] [address]    Patient confirmed the above pharmacy as correct?  Yes    Caller Information       Type Contact Phone    06/22/2020 04:49 PM Phone (Incoming) Alex Harrell (Self) [phone] (M)          Alternative phone number: none    Turnaround time given to caller:   \"Your doctor's office is closed. This message will be sent to our nurse. They will return your call as soon as they review your message. (Calls after 10 PM will be returned tomorrow morning.)\"   Pt called and would like to ask a few questions regarding her cholesterol.    Thank you

## 2024-03-27 ENCOUNTER — TELEPHONE (OUTPATIENT)
Dept: SLEEP MEDICINE | Age: 54
End: 2024-03-27

## 2024-03-27 NOTE — TELEPHONE ENCOUNTER
CALLED PT TO ASK IF SHE HAD RECEIVED ORAL APPLIANCE AND IF SHE HAD HAD A F/U WITH DENTIST. PT STATES SHE WOULD LIKE TO EXPLORE OTHER OPTIONS.

## 2024-03-27 NOTE — PROGRESS NOTES
[facility]  [address]  [place] [address]  [phone]    Patient Name:  Kristin Herrera  YOB: 1970      Office Visit 3/28/2024    CHIEF COMPLAINT:    Chief Complaint   Patient presents with    Sleep Apnea    Follow-up         HISTORY OF PRESENT ILLNESS:  Patient is a 54 yo female seen today for follow up of CONY.  Diagnosed sleep study on 03/05/2023 with an AHI of 7.2 and lowest oxygen saturation of 80%.  She was referred to dentistry for an oral appliance states that she did get an oral appliance made and did try wearing it but could not tolerate.  She would like to try CPAP.  We did also discuss positional therapy as her sleep apnea was below 5.0 when sleeping on her right side and left side.  She reports she continues to be groggy and unrefreshed in the mornings and has daytime sleepiness/fatigue.  Worthing score is 9/24.  She also continues to snore.  She would like to try CPAP so she does not have to worry about whether she sleeps on her back or not.  She denies any major medical changes since her last visit.  Reports her weight has consistently been around 200 pounds.  Her blood pressure is controlled today.            Worthing Sleepiness Scale      3/28/2024     3:09 PM 8/25/2023     8:19 AM   Sleep Medicine   Sitting and reading 0 3   Watching TV 2 2   Sitting, inactive in a public place (e.g. a theatre or a meeting) 1 3   As a passenger in a car for an hour without a break 0 2   Lying down to rest in the afternoon when circumstances permit 3 2   Sitting and talking to someone 3 0   Sitting quietly after a lunch without alcohol 0 2   In a car, while stopped for a few minutes in traffic 0 0   Worthing Sleepiness Score 9 14          Past Medical History:   Diagnosis Date    Chlamydia     COVID-19 vaccine series completed 04/20/2021    Pfizer vaccine     Depression 08/16/2011    Managed with meds     Endometriosis     Gonorrhea     History of right hip

## 2024-03-28 ENCOUNTER — OFFICE VISIT (OUTPATIENT)
Dept: SLEEP MEDICINE | Age: 54
End: 2024-03-28
Payer: COMMERCIAL

## 2024-03-28 VITALS
HEART RATE: 65 BPM | WEIGHT: 203 LBS | SYSTOLIC BLOOD PRESSURE: 123 MMHG | HEIGHT: 67 IN | DIASTOLIC BLOOD PRESSURE: 83 MMHG | OXYGEN SATURATION: 95 % | BODY MASS INDEX: 31.86 KG/M2

## 2024-03-28 DIAGNOSIS — G47.33 OSA (OBSTRUCTIVE SLEEP APNEA): Primary | ICD-10-CM

## 2024-03-28 DIAGNOSIS — E66.9 OBESITY (BMI 30.0-34.9): ICD-10-CM

## 2024-03-28 PROBLEM — E66.811 OBESITY (BMI 30.0-34.9): Status: ACTIVE | Noted: 2024-02-07

## 2024-03-28 PROCEDURE — 99213 OFFICE O/P EST LOW 20 MIN: CPT | Performed by: NURSE PRACTITIONER

## 2024-03-28 PROCEDURE — 3079F DIAST BP 80-89 MM HG: CPT | Performed by: NURSE PRACTITIONER

## 2024-03-28 PROCEDURE — 3074F SYST BP LT 130 MM HG: CPT | Performed by: NURSE PRACTITIONER

## 2024-03-28 ASSESSMENT — SLEEP AND FATIGUE QUESTIONNAIRES
HOW LIKELY ARE YOU TO NOD OFF OR FALL ASLEEP WHILE SITTING AND READING: WOULD NEVER DOZE
HOW LIKELY ARE YOU TO NOD OFF OR FALL ASLEEP WHILE LYING DOWN TO REST IN THE AFTERNOON WHEN CIRCUMSTANCES PERMIT: HIGH CHANCE OF DOZING
HOW LIKELY ARE YOU TO NOD OFF OR FALL ASLEEP WHILE SITTING INACTIVE IN A PUBLIC PLACE: SLIGHT CHANCE OF DOZING
HOW LIKELY ARE YOU TO NOD OFF OR FALL ASLEEP WHEN YOU ARE A PASSENGER IN A CAR FOR AN HOUR WITHOUT A BREAK: WOULD NEVER DOZE
HOW LIKELY ARE YOU TO NOD OFF OR FALL ASLEEP WHILE SITTING QUIETLY AFTER LUNCH WITHOUT ALCOHOL: WOULD NEVER DOZE
ESS TOTAL SCORE: 9
HOW LIKELY ARE YOU TO NOD OFF OR FALL ASLEEP IN A CAR, WHILE STOPPED FOR A FEW MINUTES IN TRAFFIC: WOULD NEVER DOZE
HOW LIKELY ARE YOU TO NOD OFF OR FALL ASLEEP WHILE WATCHING TV: MODERATE CHANCE OF DOZING
HOW LIKELY ARE YOU TO NOD OFF OR FALL ASLEEP WHILE SITTING AND TALKING TO SOMEONE: HIGH CHANCE OF DOZING

## 2024-03-28 NOTE — PATIENT INSTRUCTIONS
Start CPAP 5-12 cm H2O nightly compliance  New CPAP set up and supplies ordered  Recommendations as above  Follow-up in 4 months or sooner if needed

## 2024-06-20 ENCOUNTER — OFFICE VISIT (OUTPATIENT)
Dept: SLEEP MEDICINE | Age: 54
End: 2024-06-20
Payer: COMMERCIAL

## 2024-06-20 VITALS
HEART RATE: 57 BPM | DIASTOLIC BLOOD PRESSURE: 67 MMHG | HEIGHT: 67 IN | BODY MASS INDEX: 31.55 KG/M2 | SYSTOLIC BLOOD PRESSURE: 133 MMHG | WEIGHT: 201 LBS | OXYGEN SATURATION: 96 %

## 2024-06-20 DIAGNOSIS — G47.10 HYPERSOMNIA: ICD-10-CM

## 2024-06-20 DIAGNOSIS — G47.34 NOCTURNAL HYPOXEMIA: ICD-10-CM

## 2024-06-20 DIAGNOSIS — G47.33 OSA (OBSTRUCTIVE SLEEP APNEA): Primary | ICD-10-CM

## 2024-06-20 DIAGNOSIS — E66.9 OBESITY (BMI 30.0-34.9): ICD-10-CM

## 2024-06-20 PROCEDURE — 3078F DIAST BP <80 MM HG: CPT | Performed by: NURSE PRACTITIONER

## 2024-06-20 PROCEDURE — 99213 OFFICE O/P EST LOW 20 MIN: CPT | Performed by: NURSE PRACTITIONER

## 2024-06-20 PROCEDURE — 3075F SYST BP GE 130 - 139MM HG: CPT | Performed by: NURSE PRACTITIONER

## 2024-06-20 ASSESSMENT — SLEEP AND FATIGUE QUESTIONNAIRES
HOW LIKELY ARE YOU TO NOD OFF OR FALL ASLEEP WHEN YOU ARE A PASSENGER IN A CAR FOR AN HOUR WITHOUT A BREAK: WOULD NEVER DOZE
HOW LIKELY ARE YOU TO NOD OFF OR FALL ASLEEP IN A CAR, WHILE STOPPED FOR A FEW MINUTES IN TRAFFIC: WOULD NEVER DOZE
HOW LIKELY ARE YOU TO NOD OFF OR FALL ASLEEP WHILE SITTING AND TALKING TO SOMEONE: WOULD NEVER DOZE
HOW LIKELY ARE YOU TO NOD OFF OR FALL ASLEEP WHILE SITTING QUIETLY AFTER LUNCH WITHOUT ALCOHOL: HIGH CHANCE OF DOZING
HOW LIKELY ARE YOU TO NOD OFF OR FALL ASLEEP WHILE SITTING INACTIVE IN A PUBLIC PLACE: HIGH CHANCE OF DOZING
ESS TOTAL SCORE: 12
HOW LIKELY ARE YOU TO NOD OFF OR FALL ASLEEP WHILE WATCHING TV: WOULD NEVER DOZE
HOW LIKELY ARE YOU TO NOD OFF OR FALL ASLEEP WHILE LYING DOWN TO REST IN THE AFTERNOON WHEN CIRCUMSTANCES PERMIT: HIGH CHANCE OF DOZING
HOW LIKELY ARE YOU TO NOD OFF OR FALL ASLEEP WHILE SITTING AND READING: HIGH CHANCE OF DOZING

## 2024-06-20 NOTE — PROGRESS NOTES
[facility]  [address]  [place] [address]  [phone]    Patient Name:  Kristin Herrera  YOB: 1970      Office Visit 6/20/2024    CHIEF COMPLAINT:    Chief Complaint   Patient presents with    Sleep Apnea    Follow-up         HISTORY OF PRESENT ILLNESS:  Patient is a 54 yo female seen today for follow up of CONY.  Diagnostic sleep study on 03/05/2023 with an AHI of 7.2 and lowest oxygen saturation of 80%. She is prescribed cpap therapy with a humidifier set at 5-12 cm with a full face mask.  She has only worn CPAP 17 out of 76 days.  She reports that she has struggled with a fullface mask and often feels like she cannot breathe wearing a mask.  She only has 14 days left on the compliance would not be able to make compliance.  I did instruct her to reach out to [facility] and see if they could extend her days and provide her with a nasal mask and a chinstrap.  She also has an oral appliance and I instructed her to reach out to Dr. Newby to see if he can help her to become more comfortable with the oral appliance as well.  Positional therapy for her is also an option as her CONY was very mild when she was sleeping in the side-lying position.  Advised her to get a body pillow or a body wedge to place behind her back to ensure that she was sleep in the side-lying position.  She does report that she still is tired during the day and never awakens refreshed.  This is largely due to her sleep apnea banoxantrone.  Greenfield score is 12/24.  She denies any major medical changes since her last visit.  States that her weight has consistently been around 200 pounds.  Her blood pressure is controlled today.      Greenfield Sleepiness Scale      6/20/2024     2:36 PM 3/28/2024     3:09 PM 8/25/2023     8:19 AM   Sleep Medicine   Sitting and reading 3 0 3   Watching TV 0 2 2   Sitting, inactive in a public place (e.g. a theatre or a meeting) 3 1 3   As a passenger in a car for

## 2024-06-20 NOTE — PATIENT INSTRUCTIONS
Ask for a nasal mask with chinstrap through DME to continue to work on become compliant with CPAP  Reach out to Dr. Newby for advice on becoming comfortable with oral appliance  Positional therapy to assure you always sleep on your side  Recommendations as above  Follow-up in 3 months or sooner if needed  
Adequate: hears normal conversation without difficulty

## 2024-10-29 ENCOUNTER — TELEPHONE (OUTPATIENT)
Dept: FAMILY MEDICINE CLINIC | Facility: CLINIC | Age: 54
End: 2024-10-29

## 2024-10-29 NOTE — TELEPHONE ENCOUNTER
Dfm:    Welcome to [facility]! We can't wait to meet you!          MD Danial Sierra MD Karla Lester, PA-C Kaitlin Eckley, PA-C     Our care team are here to serve you.    Our office is conveniently located at: [address] [address].    If you have any questions prior to your appointment, please contact our practice manager, Ani Frausto, at [phone].    Sincerely,     [facility]    Stay in touch… Get convenient, quality care at your fingertips!  We know you are busy; use [employer] to manage your health care tasks on your schedule. With our convenient [employer] aisha, you can:    Request or schedule an appointment  Request prescription refills  Communicate with your [facility] provider  Get test results  View health information    Remember to complete the internetstorest Visit Pre-Check before your appointment.  It is quick, easy, and will speed up your check-in process when you come to the office.     For more information about our complete line of services within our [employer] system, please visit [facility].  We appreciate you trusting us with your care

## 2025-04-04 ENCOUNTER — OFFICE VISIT (OUTPATIENT)
Dept: FAMILY MEDICINE CLINIC | Facility: CLINIC | Age: 55
End: 2025-04-04
Payer: COMMERCIAL

## 2025-04-04 VITALS
BODY MASS INDEX: 31.08 KG/M2 | WEIGHT: 198 LBS | HEART RATE: 53 BPM | HEIGHT: 67 IN | TEMPERATURE: 97.3 F | DIASTOLIC BLOOD PRESSURE: 72 MMHG | OXYGEN SATURATION: 96 % | SYSTOLIC BLOOD PRESSURE: 130 MMHG

## 2025-04-04 DIAGNOSIS — Z80.3 FAMILY HISTORY OF BREAST CANCER IN MOTHER: ICD-10-CM

## 2025-04-04 DIAGNOSIS — E55.9 VITAMIN D DEFICIENCY: Primary | ICD-10-CM

## 2025-04-04 DIAGNOSIS — S39.012D STRAIN OF LUMBAR REGION, SUBSEQUENT ENCOUNTER: ICD-10-CM

## 2025-04-04 DIAGNOSIS — N95.1 HOT FLASHES DUE TO MENOPAUSE: ICD-10-CM

## 2025-04-04 DIAGNOSIS — E78.49 FAMILIAL HYPERLIPIDEMIA: ICD-10-CM

## 2025-04-04 DIAGNOSIS — I10 ESSENTIAL HYPERTENSION: ICD-10-CM

## 2025-04-04 DIAGNOSIS — E66.811 OBESITY (BMI 30.0-34.9): ICD-10-CM

## 2025-04-04 DIAGNOSIS — G43.011 INTRACTABLE MIGRAINE WITHOUT AURA AND WITH STATUS MIGRAINOSUS: ICD-10-CM

## 2025-04-04 LAB
25(OH)D3 SERPL-MCNC: 26.7 NG/ML (ref 30–100)
ALBUMIN SERPL-MCNC: 4 G/DL (ref 3.5–5)
ALBUMIN/GLOB SERPL: 1.1 (ref 1–1.9)
ALP SERPL-CCNC: 96 U/L (ref 35–104)
ALT SERPL-CCNC: 19 U/L (ref 8–45)
ANION GAP SERPL CALC-SCNC: 11 MMOL/L (ref 7–16)
AST SERPL-CCNC: 23 U/L (ref 15–37)
BASOPHILS # BLD: 0.04 K/UL (ref 0–0.2)
BASOPHILS NFR BLD: 0.8 % (ref 0–2)
BILIRUB SERPL-MCNC: 0.3 MG/DL (ref 0–1.2)
BUN SERPL-MCNC: 17 MG/DL (ref 6–23)
CALCIUM SERPL-MCNC: 9.7 MG/DL (ref 8.8–10.2)
CHLORIDE SERPL-SCNC: 107 MMOL/L (ref 98–107)
CHOLEST SERPL-MCNC: 249 MG/DL (ref 0–200)
CO2 SERPL-SCNC: 24 MMOL/L (ref 20–29)
CREAT SERPL-MCNC: 0.86 MG/DL (ref 0.6–1.1)
DIFFERENTIAL METHOD BLD: ABNORMAL
EOSINOPHIL # BLD: 0.07 K/UL (ref 0–0.8)
EOSINOPHIL NFR BLD: 1.4 % (ref 0.5–7.8)
ERYTHROCYTE [DISTWIDTH] IN BLOOD BY AUTOMATED COUNT: 13.8 % (ref 11.9–14.6)
GLOBULIN SER CALC-MCNC: 3.5 G/DL (ref 2.3–3.5)
GLUCOSE SERPL-MCNC: 84 MG/DL (ref 70–99)
HCT VFR BLD AUTO: 44.4 % (ref 35.8–46.3)
HDLC SERPL-MCNC: 52 MG/DL (ref 40–60)
HDLC SERPL: 4.8 (ref 0–5)
HGB BLD-MCNC: 14.9 G/DL (ref 11.7–15.4)
IMM GRANULOCYTES # BLD AUTO: 0.02 K/UL (ref 0–0.5)
IMM GRANULOCYTES NFR BLD AUTO: 0.4 % (ref 0–5)
LDLC SERPL CALC-MCNC: 181 MG/DL (ref 0–100)
LYMPHOCYTES # BLD: 1.86 K/UL (ref 0.5–4.6)
LYMPHOCYTES NFR BLD: 36.5 % (ref 13–44)
MCH RBC QN AUTO: 28 PG (ref 26.1–32.9)
MCHC RBC AUTO-ENTMCNC: 33.6 G/DL (ref 31.4–35)
MCV RBC AUTO: 83.3 FL (ref 82–102)
MONOCYTES # BLD: 0.29 K/UL (ref 0.1–1.3)
MONOCYTES NFR BLD: 5.7 % (ref 4–12)
NEUTS SEG # BLD: 2.82 K/UL (ref 1.7–8.2)
NEUTS SEG NFR BLD: 55.2 % (ref 43–78)
NRBC # BLD: 0 K/UL (ref 0–0.2)
PLATELET # BLD AUTO: 266 K/UL (ref 150–450)
PMV BLD AUTO: 10.3 FL (ref 9.4–12.3)
POTASSIUM SERPL-SCNC: 3.9 MMOL/L (ref 3.5–5.1)
PROT SERPL-MCNC: 7.5 G/DL (ref 6.3–8.2)
RBC # BLD AUTO: 5.33 M/UL (ref 4.05–5.2)
SODIUM SERPL-SCNC: 142 MMOL/L (ref 136–145)
TRIGL SERPL-MCNC: 81 MG/DL (ref 0–150)
VLDLC SERPL CALC-MCNC: 16 MG/DL (ref 6–23)
WBC # BLD AUTO: 5.1 K/UL (ref 4.3–11.1)

## 2025-04-04 PROCEDURE — 3075F SYST BP GE 130 - 139MM HG: CPT | Performed by: PHYSICIAN ASSISTANT

## 2025-04-04 PROCEDURE — 99214 OFFICE O/P EST MOD 30 MIN: CPT | Performed by: PHYSICIAN ASSISTANT

## 2025-04-04 PROCEDURE — 3078F DIAST BP <80 MM HG: CPT | Performed by: PHYSICIAN ASSISTANT

## 2025-04-04 RX ORDER — LIDOCAINE 50 MG/G
1 PATCH TOPICAL DAILY
Qty: 30 PATCH | Refills: 0 | Status: SHIPPED | OUTPATIENT
Start: 2025-04-04 | End: 2025-05-04

## 2025-04-04 RX ORDER — AMLODIPINE BESYLATE 5 MG/1
5 TABLET ORAL DAILY
Qty: 30 TABLET | Refills: 11 | Status: SHIPPED | OUTPATIENT
Start: 2025-04-04

## 2025-04-04 RX ORDER — ERGOCALCIFEROL 1.25 MG/1
50000 CAPSULE, LIQUID FILLED ORAL WEEKLY
Qty: 12 CAPSULE | Refills: 1 | Status: SHIPPED | OUTPATIENT
Start: 2025-04-04

## 2025-04-04 RX ORDER — BUTALBITAL, ACETAMINOPHEN AND CAFFEINE 50; 325; 40 MG/1; MG/1; MG/1
1 TABLET ORAL EVERY 4 HOURS PRN
Qty: 180 TABLET | Refills: 3 | Status: CANCELLED | OUTPATIENT
Start: 2025-04-04

## 2025-04-04 SDOH — ECONOMIC STABILITY: FOOD INSECURITY: WITHIN THE PAST 12 MONTHS, YOU WORRIED THAT YOUR FOOD WOULD RUN OUT BEFORE YOU GOT MONEY TO BUY MORE.: NEVER TRUE

## 2025-04-04 SDOH — ECONOMIC STABILITY: FOOD INSECURITY: WITHIN THE PAST 12 MONTHS, THE FOOD YOU BOUGHT JUST DIDN'T LAST AND YOU DIDN'T HAVE MONEY TO GET MORE.: NEVER TRUE

## 2025-04-04 ASSESSMENT — PATIENT HEALTH QUESTIONNAIRE - PHQ9
5. POOR APPETITE OR OVEREATING: NOT AT ALL
SUM OF ALL RESPONSES TO PHQ QUESTIONS 1-9: 1
SUM OF ALL RESPONSES TO PHQ QUESTIONS 1-9: 1
8. MOVING OR SPEAKING SO SLOWLY THAT OTHER PEOPLE COULD HAVE NOTICED. OR THE OPPOSITE, BEING SO FIGETY OR RESTLESS THAT YOU HAVE BEEN MOVING AROUND A LOT MORE THAN USUAL: NOT AT ALL
7. TROUBLE CONCENTRATING ON THINGS, SUCH AS READING THE NEWSPAPER OR WATCHING TELEVISION: NOT AT ALL
1. LITTLE INTEREST OR PLEASURE IN DOING THINGS: NOT AT ALL
SUM OF ALL RESPONSES TO PHQ QUESTIONS 1-9: 1
2. FEELING DOWN, DEPRESSED OR HOPELESS: NOT AT ALL
10. IF YOU CHECKED OFF ANY PROBLEMS, HOW DIFFICULT HAVE THESE PROBLEMS MADE IT FOR YOU TO DO YOUR WORK, TAKE CARE OF THINGS AT HOME, OR GET ALONG WITH OTHER PEOPLE: SOMEWHAT DIFFICULT
SUM OF ALL RESPONSES TO PHQ QUESTIONS 1-9: 1
4. FEELING TIRED OR HAVING LITTLE ENERGY: SEVERAL DAYS
6. FEELING BAD ABOUT YOURSELF - OR THAT YOU ARE A FAILURE OR HAVE LET YOURSELF OR YOUR FAMILY DOWN: NOT AT ALL
9. THOUGHTS THAT YOU WOULD BE BETTER OFF DEAD, OR OF HURTING YOURSELF: NOT AT ALL
3. TROUBLE FALLING OR STAYING ASLEEP: NOT AT ALL

## 2025-04-04 ASSESSMENT — ENCOUNTER SYMPTOMS
BACK PAIN: 1
SHORTNESS OF BREATH: 0

## 2025-04-04 NOTE — PROGRESS NOTES
Establish Care  Name: Kristin Herrera Today’s Date: 2025   MRN: [id number] Sex: Female   Age: 54 y.o. Ethnicity: Non- / Non    : 1970 Race: Black /       Kristin Herrera is here to establish care.       Assessment & Plan   Vitamin D deficiency  -     vitamin D (ERGOCALCIFEROL) 1.25 MG (92714 UT) CAPS capsule; Take 1 capsule by mouth once a week, Disp-12 capsule, R-1Normal  Essential hypertension  -     amLODIPine (NORVASC) 5 MG tablet; Take 1 tablet by mouth daily TAKE 1 TABLET BY MOUTH EVERY DAY, Disp-30 tablet, R-11Normal  -     CBC with Auto Differential; Future  -     Comprehensive Metabolic Panel; Future  -     Lipid Panel; Future  -     Vitamin D 25 Hydroxy; Future  Hot flashes due to menopause  -     [facility]  Intractable migraine without aura and with status migrainosus  Family history of breast cancer in mother  -     [facility] DIGITAL SCREEN W OR WO CAD BILATERAL; Future  Familial hyperlipidemia  -     CBC with Auto Differential; Future  -     Comprehensive Metabolic Panel; Future  -     Vitamin D 25 Hydroxy; Future  Obesity (BMI 30.0-34.9)  -     CBC with Auto Differential; Future  -     Comprehensive Metabolic Panel; Future  -     Lipid Panel; Future  -     Vitamin D 25 Hydroxy; Future  Strain of lumbar region, subsequent encounter  -     lidocaine (LIDODERM) 5 %; Place 1 patch onto the skin daily 12 hours on, 12 hours off., Disp-30 patch, R-0Normal        Return in about 6 months (around 10/4/2025).       Subjective   Ms. Herrera is establishing with me today, previously saw Ani Michael NP. She is doing well from health standpoint since her last wellness visit.   Htn-well controlled on amlodipine, no SE  CONY- follows with sleep medicine  Chronic low back pain- follows with orth  Chronic hip pain- follows with ortho  Menopause with vasomotor sxs- tried prozac in the past but no help with nightly hot flashes, also tried HRT in

## 2025-04-07 ENCOUNTER — RESULTS FOLLOW-UP (OUTPATIENT)
Dept: FAMILY MEDICINE CLINIC | Facility: CLINIC | Age: 55
End: 2025-04-07

## 2025-04-15 ENCOUNTER — PATIENT MESSAGE (OUTPATIENT)
Dept: FAMILY MEDICINE CLINIC | Facility: CLINIC | Age: 55
End: 2025-04-15

## 2025-04-15 DIAGNOSIS — G43.709 CHRONIC MIGRAINE WITHOUT AURA WITHOUT STATUS MIGRAINOSUS, NOT INTRACTABLE: Primary | ICD-10-CM

## 2025-04-15 DIAGNOSIS — E78.5 HYPERLIPIDEMIA, UNSPECIFIED HYPERLIPIDEMIA TYPE: Primary | ICD-10-CM

## 2025-04-15 RX ORDER — ATORVASTATIN CALCIUM 20 MG/1
20 TABLET, FILM COATED ORAL DAILY
Qty: 90 TABLET | Refills: 0 | Status: SHIPPED | OUTPATIENT
Start: 2025-04-15

## 2025-04-15 RX ORDER — BUTALBITAL, ACETAMINOPHEN AND CAFFEINE 300; 40; 50 MG/1; MG/1; MG/1
1 CAPSULE ORAL EVERY 6 HOURS PRN
Qty: 20 CAPSULE | Refills: 0 | Status: SHIPPED | OUTPATIENT
Start: 2025-04-15 | End: 2025-04-20

## 2025-06-16 DIAGNOSIS — G43.709 CHRONIC MIGRAINE WITHOUT AURA WITHOUT STATUS MIGRAINOSUS, NOT INTRACTABLE: ICD-10-CM

## 2025-06-17 RX ORDER — BUTALBITAL, ACETAMINOPHEN AND CAFFEINE 300; 40; 50 MG/1; MG/1; MG/1
1 CAPSULE ORAL EVERY 6 HOURS PRN
Qty: 20 CAPSULE | Refills: 0 | Status: SHIPPED | OUTPATIENT
Start: 2025-06-17 | End: 2025-06-22

## 2025-06-18 ENCOUNTER — PATIENT MESSAGE (OUTPATIENT)
Dept: FAMILY MEDICINE CLINIC | Facility: CLINIC | Age: 55
End: 2025-06-18

## 2025-06-18 DIAGNOSIS — G43.709 CHRONIC MIGRAINE WITHOUT AURA WITHOUT STATUS MIGRAINOSUS, NOT INTRACTABLE: ICD-10-CM

## 2025-06-23 RX ORDER — BUTALBITAL, ACETAMINOPHEN AND CAFFEINE 300; 40; 50 MG/1; MG/1; MG/1
1 CAPSULE ORAL EVERY 4 HOURS PRN
Qty: 20 CAPSULE | Refills: 0 | Status: SHIPPED | OUTPATIENT
Start: 2025-06-23 | End: 2025-06-28

## 2025-06-23 RX ORDER — BUTALBITAL, ACETAMINOPHEN AND CAFFEINE 300; 40; 50 MG/1; MG/1; MG/1
1 CAPSULE ORAL EVERY 6 HOURS PRN
Qty: 20 CAPSULE | Refills: 0 | Status: SHIPPED | OUTPATIENT
Start: 2025-06-23 | End: 2025-06-23

## 2025-07-31 ENCOUNTER — HOSPITAL ENCOUNTER (OUTPATIENT)
Dept: MAMMOGRAPHY | Age: 55
Discharge: HOME | End: 2025-07-31
Payer: MEDICARE

## 2025-07-31 DIAGNOSIS — Z80.3 FAMILY HISTORY OF BREAST CANCER IN MOTHER: ICD-10-CM

## 2025-07-31 PROCEDURE — 77063 BREAST TOMOSYNTHESIS BI: CPT

## 2025-07-31 PROCEDURE — 77067 SCR MAMMO BI INCL CAD: CPT

## 2025-08-22 ASSESSMENT — SLEEP AND FATIGUE QUESTIONNAIRES
HOW LIKELY ARE YOU TO NOD OFF OR FALL ASLEEP WHILE SITTING AND TALKING TO SOMEONE: SLIGHT CHANCE OF DOZING
HOW LIKELY ARE YOU TO NOD OFF OR FALL ASLEEP WHILE SITTING AND READING: MODERATE CHANCE OF DOZING
HOW LIKELY ARE YOU TO NOD OFF OR FALL ASLEEP WHILE WATCHING TV: SLIGHT CHANCE OF DOZING
HOW LIKELY ARE YOU TO NOD OFF OR FALL ASLEEP WHILE LYING DOWN TO REST IN THE AFTERNOON WHEN CIRCUMSTANCES PERMIT: HIGH CHANCE OF DOZING
HOW LIKELY ARE YOU TO NOD OFF OR FALL ASLEEP WHEN YOU ARE A PASSENGER IN A CAR FOR AN HOUR WITHOUT A BREAK: SLIGHT CHANCE OF DOZING
HOW LIKELY ARE YOU TO NOD OFF OR FALL ASLEEP WHILE WATCHING TV: SLIGHT CHANCE OF DOZING
HOW LIKELY ARE YOU TO NOD OFF OR FALL ASLEEP WHILE SITTING AND READING: MODERATE CHANCE OF DOZING
HOW LIKELY ARE YOU TO NOD OFF OR FALL ASLEEP IN A CAR, WHILE STOPPED FOR A FEW MINUTES IN TRAFFIC: WOULD NEVER DOZE
ESS TOTAL SCORE: 10
HOW LIKELY ARE YOU TO NOD OFF OR FALL ASLEEP WHILE SITTING INACTIVE IN A PUBLIC PLACE: SLIGHT CHANCE OF DOZING
HOW LIKELY ARE YOU TO NOD OFF OR FALL ASLEEP WHEN YOU ARE A PASSENGER IN A CAR FOR AN HOUR WITHOUT A BREAK: SLIGHT CHANCE OF DOZING
HOW LIKELY ARE YOU TO NOD OFF OR FALL ASLEEP WHILE LYING DOWN TO REST IN THE AFTERNOON WHEN CIRCUMSTANCES PERMIT: HIGH CHANCE OF DOZING
HOW LIKELY ARE YOU TO NOD OFF OR FALL ASLEEP WHILE SITTING QUIETLY AFTER LUNCH WITHOUT ALCOHOL: SLIGHT CHANCE OF DOZING
HOW LIKELY ARE YOU TO NOD OFF OR FALL ASLEEP WHILE SITTING AND TALKING TO SOMEONE: SLIGHT CHANCE OF DOZING
HOW LIKELY ARE YOU TO NOD OFF OR FALL ASLEEP WHILE SITTING INACTIVE IN A PUBLIC PLACE: SLIGHT CHANCE OF DOZING
HOW LIKELY ARE YOU TO NOD OFF OR FALL ASLEEP IN A CAR, WHILE STOPPED FOR A FEW MINUTES IN TRAFFIC: WOULD NEVER DOZE
HOW LIKELY ARE YOU TO NOD OFF OR FALL ASLEEP WHILE SITTING QUIETLY AFTER LUNCH WITHOUT ALCOHOL: SLIGHT CHANCE OF DOZING

## 2025-08-23 ENCOUNTER — OFFICE VISIT (OUTPATIENT)
Dept: SLEEP MEDICINE | Age: 55
End: 2025-08-23
Payer: MEDICARE

## 2025-08-23 VITALS
RESPIRATION RATE: 14 BRPM | HEIGHT: 67 IN | WEIGHT: 200 LBS | HEART RATE: 57 BPM | BODY MASS INDEX: 31.39 KG/M2 | DIASTOLIC BLOOD PRESSURE: 84 MMHG | OXYGEN SATURATION: 98 % | SYSTOLIC BLOOD PRESSURE: 118 MMHG

## 2025-08-23 DIAGNOSIS — G47.33 OSA (OBSTRUCTIVE SLEEP APNEA): Primary | ICD-10-CM

## 2025-08-23 DIAGNOSIS — G47.10 HYPERSOMNIA: ICD-10-CM

## 2025-08-23 DIAGNOSIS — G47.34 NOCTURNAL HYPOXEMIA: ICD-10-CM

## 2025-08-23 DIAGNOSIS — E66.811 OBESITY (BMI 30.0-34.9): ICD-10-CM

## 2025-08-23 PROCEDURE — 99213 OFFICE O/P EST LOW 20 MIN: CPT | Performed by: NURSE PRACTITIONER

## 2025-08-23 PROCEDURE — 3074F SYST BP LT 130 MM HG: CPT | Performed by: NURSE PRACTITIONER

## 2025-08-23 PROCEDURE — 3079F DIAST BP 80-89 MM HG: CPT | Performed by: NURSE PRACTITIONER

## 2025-08-23 PROCEDURE — G2211 COMPLEX E/M VISIT ADD ON: HCPCS | Performed by: NURSE PRACTITIONER

## (undated) DEVICE — NDL PRT INJ NSAF BLNT 18GX1.5 --

## (undated) DEVICE — SYR 3ML LL TIP 1/10ML GRAD --

## (undated) DEVICE — CANNULA NSL ORAL AD FOR CAPNOFLEX CO2 O2 AIRLFE

## (undated) DEVICE — CONNECTOR TBNG OD5-7MM O2 END DISP

## (undated) DEVICE — SYR 5ML 1/5 GRAD LL NSAF LF --

## (undated) DEVICE — KENDALL RADIOLUCENT FOAM MONITORING ELECTRODE RECTANGULAR SHAPE: Brand: KENDALL